# Patient Record
Sex: MALE | Race: OTHER | HISPANIC OR LATINO | Employment: OTHER | URBAN - METROPOLITAN AREA
[De-identification: names, ages, dates, MRNs, and addresses within clinical notes are randomized per-mention and may not be internally consistent; named-entity substitution may affect disease eponyms.]

---

## 2017-01-09 ENCOUNTER — GENERIC CONVERSION - ENCOUNTER (OUTPATIENT)
Dept: INTERNAL MEDICINE CLINIC | Facility: CLINIC | Age: 67
End: 2017-01-09

## 2017-08-07 ENCOUNTER — GENERIC CONVERSION - ENCOUNTER (OUTPATIENT)
Dept: INTERNAL MEDICINE CLINIC | Facility: CLINIC | Age: 67
End: 2017-08-07

## 2017-08-11 ENCOUNTER — GENERIC CONVERSION - ENCOUNTER (OUTPATIENT)
Dept: INTERNAL MEDICINE CLINIC | Facility: CLINIC | Age: 67
End: 2017-08-11

## 2017-09-13 ENCOUNTER — GENERIC CONVERSION - ENCOUNTER (OUTPATIENT)
Dept: INTERNAL MEDICINE CLINIC | Facility: CLINIC | Age: 67
End: 2017-09-13

## 2017-09-25 ENCOUNTER — GENERIC CONVERSION - ENCOUNTER (OUTPATIENT)
Dept: INTERNAL MEDICINE CLINIC | Facility: CLINIC | Age: 67
End: 2017-09-25

## 2017-10-23 ENCOUNTER — GENERIC CONVERSION - ENCOUNTER (OUTPATIENT)
Dept: OTHER | Facility: OTHER | Age: 67
End: 2017-10-23

## 2017-10-23 ENCOUNTER — GENERIC CONVERSION - ENCOUNTER (OUTPATIENT)
Dept: INTERNAL MEDICINE CLINIC | Facility: CLINIC | Age: 67
End: 2017-10-23

## 2017-10-25 ENCOUNTER — GENERIC CONVERSION - ENCOUNTER (OUTPATIENT)
Dept: INTERNAL MEDICINE CLINIC | Facility: CLINIC | Age: 67
End: 2017-10-25

## 2017-11-30 ENCOUNTER — GENERIC CONVERSION - ENCOUNTER (OUTPATIENT)
Dept: OTHER | Facility: OTHER | Age: 67
End: 2017-11-30

## 2017-12-06 ENCOUNTER — ALLSCRIPTS OFFICE VISIT (OUTPATIENT)
Dept: OTHER | Facility: OTHER | Age: 67
End: 2017-12-06

## 2017-12-10 NOTE — CONSULTS
Assessment  1  Polyp of colon (211 3) (K63 5)   2  Chronic GERD (530 81) (K21 9)    Plan  Chronic GERD    · Omeprazole 40 MG Oral Capsule Delayed Release; take 1 capsule daily   Rx By: Madiha Pond; Dispense: 30 Days ; #:30 Capsule Delayed Release; Refill: 3;Chronic GERD; TRESSA = N; Verified Transmission to [id number]; Last Updated By: System, SureScriVixlo; 12/6/2017 9:16:50 AM   · Follow-up visit in 3 months Evaluation and Treatment  Follow-up  Status: Hold For -Scheduling  Requested for: [id number]   Ordered; For: Chronic GERD; Ordered By: Madiha Pond Performed:  Due: 56KXH9722   · EGD; Status:Active; Requested for:82Uab5293;    Perform:[facility]; Due:32Dns3405; Last Updated Diane Garcia; 12/6/2017 9:37:24 AM;Ordered; For:Chronic GERD; Ordered By:Santana Paiz;  Polyp of colon    · COLONOSCOPY (GI, SURG); Status:Active; Requested for:49Cmn9118;    Perform:[facility]; Order Comments:with polypectomy - 45 minutes in [place] ; Due:11Btw9911; Last Updated By:Marisa Cooper; 12/6/2017 9:37:24 AM;Ordered;of colon; Ordered By:Santana Paiz;    Discussion/Summary  Discussion Summary:   80-year-old male who [facility] for [facility] of large polyp  He had a 2 2 cm large polyp in the transverse colon that was marked with spot  I will schedule him for colonoscopy with polypectomy  I discussed with the patient that since this polyp is large there is slightly increased risk of bleeding and perforation Patient verbalized understanding and agreed to proceed with colonoscopy  He has chronic gastroesophageal reflux disease  I will start him on omeprazole 40 mg daily  We reviewed reflux precautions  I gave him information on reflux precaution  A schedule him for EGD to assess for esophagitis and rule out Alexis's esophagus  Follow-up 3 months     Counseling Documentation With Imm: The patient was counseled regarding prognosis,-- patient and family education,-- impressions  Goals and Barriers: The patient has the current Goals: See above  The patent has the current Barriers: None  Patient's Capacity to Self-Care: Patient is able to Self-Care  Chief Complaint  Chief Complaint Free Text Note Form: Colon consult  Pt states no active symptoms  Referred by Dr mEerita Vera  History of Present Illness  HPI: 51-year-old pleasant male with diabetes, hyperlipidemia, hypertension and BPH referred for colonoscopy and polypectomy  Patient had colonoscopy on October 23, 2017 by Dr Hernan Ortiz in [facility]  Colonoscopy was performed for screening and intermittent rectal bleeding  He had a 7 mm sessile polyp removed from transverse colon  At 1 2 cm sessile polyp removed from transverse colon  A 7 mm sessile polyp removed from transverse colon  At 1 7 cm sessile polyp removed from ascending colon 3 small sessile polyps removed from the cecum  A 2 2 cm sessile polyp was noted in the transverse colon but not removed  It was marked with Spot  He has chronic gastroesophageal reflux disease  His symptoms include epigastric burning pain and occasional regurgitation  His symptoms are worse with eating spicy food and alcohol  He takes over-the-counter antacid and [place] for his symptoms  Review of Systems  Complete-Male GI Adult:  Constitutional: No fever or chills, feels well, no tiredness, no recent weight gain or weight loss  Eyes: No complaints of eye pain, no red eyes, no discharge from eyes, no itchy eyes  ENT: no complaints of earache, no hearing loss, no nosebleeds, no nasal discharge, no sore throat, no hoarseness  Cardiovascular: No complaints of slow heart rate, no fast heart rate, no chest pain, no palpitations, no leg claudication, no lower extremity  Respiratory: No complaints of shortness of breath, no wheezing, no cough, no SOB on exertion, no orthopnea or PND    Gastrointestinal: as noted in HPI  Genitourinary: No complaints of dysuria, no incontinence, no hesitancy, no nocturia, no genital lesion, no testicular pain  Musculoskeletal: No complaints of arthralgia, no myalgias, no joint swelling or stiffness, no limb pain or swelling  Integumentary: No complaints of skin rash or skin lesions, no itching, no skin wound, no dry skin  Neurological: No compliants of headache, no confusion, no convulsions, no numbness or tingling, no dizziness or fainting, no limb weakness, no difficulty walking  Psychiatric: Is not suicidal, no sleep disturbances, no anxiety or depression, no change in personality, no emotional problems  Endocrine: No complaints of proptosis, no hot flashes, no muscle weakness, no erectile dysfunction, no deepening of the voice, no feelings of weakness  Hematologic/Lymphatic: No complaints of swollen glands, no swollen glands in the neck, does not bleed easily, no easy bruising  ROS Reviewed:   ROS reviewed  Active Problems  1  Benign essential hypertension (401 1) (I10)   2  DM type 2 (diabetes mellitus, type 2) (250 00) (E11 9)   3  Osteoarthritis of lumbar spine (721 3) (M47 816)   4  Polyp of colon (211 3) (K63 5)    Past Medical History  1  History of carpal tunnel syndrome (V12 49) (A94 88)  Active Problems And Past Medical History Reviewed: The active problems and past medical history were reviewed and updated today  Surgical History  1  History of Appendectomy   2  History of Neuroplasty Median Nerve At Carpal Tunnel  Surgical History Reviewed: The surgical history was reviewed and updated today  Family History  Mother    1  Family history of hypertension (V17 49) (Z82 49)  Father    2  Family history of gastric ulcer (V18 59) (Z83 79)  Family History Reviewed: The family history was reviewed and updated today  Social History     · Never a smoker   · No illicit drug use   · Social alcohol use (Z78 9)  Social History Reviewed:  The social history was reviewed and updated today  The social history was reviewed and is unchanged  Current Meds   1  AmLODIPine Besylate 10 MG Oral Tablet; TAKE 1 TABLET DAILY  Requested for: 36VRO2404; Last Rx:30Nov2017 Ordered   2  Ibuprofen 600 MG Oral Tablet; Take one tablet every 8 hours as needed for pain; Therapy: (Recorded:30Nov2017) to Recorded   3  MetFORMIN HCl - 1000 MG Oral Tablet; TAKE 1 TABLET TWICE DAILY  Requested for: 02XIE4447; Last Rx:30Nov2017 Ordered   4  Oxybutynin Chloride ER 10 MG Oral Tablet Extended Release 24 Hour; take 1 tablet by mouth every day  Requested for: 68KNO6118; Last Rx:30Nov2017 Ordered   5  Rosuvastatin Calcium 20 MG Oral Tablet; TAKE 1 TABLET DAILY  Requested for: 66BRA3645; Last Rx:30Nov2017 Ordered   6  Valsartan 320 MG Oral Tablet; TAKE 1 TABLET DAILY  Requested for: 15LER8761; Last Rx:30Nov2017 Ordered  Medication List Reviewed: The medication list was reviewed and updated today  Allergies  1  No Known Drug Allergies    Vitals  Vital Signs    Recorded: 52AWA6715 08:36AM   Temperature 97 8 F, Tympanic   Heart Rate 79   Systolic 201, LUE, Sitting   Diastolic 82, LUE, Sitting   Height 5 ft 2 99 in   Weight 184 lb    BMI Calculated 32 61   BSA Calculated 1 87   O2 Saturation 98       Physical Exam   Constitutional  General appearance: No acute distress, well appearing and well nourished  Ears, Nose, Mouth, and Throat  Nasal mucosa, septum, and turbinates: Normal without edema or erythema  Oropharynx: Normal with no erythema, edema, exudate or lesions  Pulmonary  Respiratory effort: No increased work of breathing or signs of respiratory distress  Auscultation of lungs: Clear to auscultation, equal breath sounds bilaterally, no wheezes, no rales, no rhonci  Cardiovascular  Auscultation of heart: Normal rate and rhythm, normal S1 and S2, without murmurs  Abdomen  Abdomen: Non-tender, no masses  Liver and spleen: No hepatomegaly or splenomegaly     Lymphatic Palpation of lymph nodes in neck: No lymphadenopathy  Musculoskeletal  Gait and station: Normal    Skin  Skin and subcutaneous tissue: Normal without rashes or lesions  Neurologic  Reflexes: 2+ and symmetric     Psychiatric  Orientation to person, place and time: Normal          Future Appointments    Date/Time Provider Specialty Site   02/08/2018 08:30 AM Leo Le MD Gastroenterology Adult [facility] OUTPATIENT   02/21/2018 10:30 AM Shawnee Arreola MD Internal Medicine [facility] Naz Cooper       Signatures   Electronically signed by : Santhosh Wan MD; Dec  9 2017  2:32PM EST                       (Author)

## 2017-12-21 LAB
GLUCOSE SERPL-MCNC: 180 MG/DL
HBA1C MFR BLD HPLC: 8.6 %
LDLC SERPL CALC-MCNC: NORMAL MG/DL
PSA (HISTORICAL): 4.2

## 2018-01-22 VITALS
WEIGHT: 180.38 LBS | SYSTOLIC BLOOD PRESSURE: 134 MMHG | DIASTOLIC BLOOD PRESSURE: 76 MMHG | HEIGHT: 63 IN | BODY MASS INDEX: 31.96 KG/M2 | TEMPERATURE: 97 F | HEART RATE: 88 BPM | OXYGEN SATURATION: 98 %

## 2018-01-22 VITALS
WEIGHT: 184 LBS | TEMPERATURE: 97.8 F | DIASTOLIC BLOOD PRESSURE: 82 MMHG | OXYGEN SATURATION: 98 % | SYSTOLIC BLOOD PRESSURE: 136 MMHG | BODY MASS INDEX: 32.6 KG/M2 | HEART RATE: 79 BPM | HEIGHT: 63 IN

## 2018-02-01 RX ORDER — OXYBUTYNIN CHLORIDE 10 MG/1
10 TABLET, EXTENDED RELEASE ORAL
COMMUNITY
End: 2019-01-31 | Stop reason: SDUPTHER

## 2018-02-01 RX ORDER — ROSUVASTATIN CALCIUM 20 MG/1
20 TABLET, COATED ORAL DAILY
COMMUNITY
End: 2018-06-20 | Stop reason: SDUPTHER

## 2018-02-01 RX ORDER — VALSARTAN 320 MG/1
320 TABLET ORAL DAILY
COMMUNITY
End: 2018-09-09 | Stop reason: SDUPTHER

## 2018-02-01 RX ORDER — IBUPROFEN 600 MG/1
TABLET ORAL EVERY 8 HOURS PRN
COMMUNITY
End: 2018-10-26 | Stop reason: SDUPTHER

## 2018-02-01 RX ORDER — AMLODIPINE BESYLATE 10 MG/1
10 TABLET ORAL DAILY
COMMUNITY
End: 2018-02-20

## 2018-02-07 ENCOUNTER — ANESTHESIA EVENT (OUTPATIENT)
Dept: GASTROENTEROLOGY | Facility: HOSPITAL | Age: 68
End: 2018-02-07
Payer: COMMERCIAL

## 2018-02-08 ENCOUNTER — HOSPITAL ENCOUNTER (OUTPATIENT)
Facility: HOSPITAL | Age: 68
Setting detail: OUTPATIENT SURGERY
Discharge: HOME | End: 2018-02-08
Attending: INTERNAL MEDICINE | Admitting: INTERNAL MEDICINE
Payer: COMMERCIAL

## 2018-02-08 ENCOUNTER — ANESTHESIA (OUTPATIENT)
Dept: GASTROENTEROLOGY | Facility: HOSPITAL | Age: 68
End: 2018-02-08
Payer: COMMERCIAL

## 2018-02-08 VITALS
HEART RATE: 56 BPM | RESPIRATION RATE: 15 BRPM | WEIGHT: 180 LBS | HEIGHT: 65 IN | DIASTOLIC BLOOD PRESSURE: 84 MMHG | TEMPERATURE: 97.3 F | SYSTOLIC BLOOD PRESSURE: 151 MMHG | OXYGEN SATURATION: 97 % | BODY MASS INDEX: 29.99 KG/M2

## 2018-02-08 DIAGNOSIS — K63.5 POLYP OF COLON: ICD-10-CM

## 2018-02-08 DIAGNOSIS — K21.9 GASTRO-ESOPHAGEAL REFLUX DISEASE WITHOUT ESOPHAGITIS: ICD-10-CM

## 2018-02-08 PROCEDURE — 43239 EGD BIOPSY SINGLE/MULTIPLE: CPT | Performed by: INTERNAL MEDICINE

## 2018-02-08 PROCEDURE — 88305 TISSUE EXAM BY PATHOLOGIST: CPT | Performed by: INTERNAL MEDICINE

## 2018-02-08 PROCEDURE — 88342 IMHCHEM/IMCYTCHM 1ST ANTB: CPT | Performed by: PATHOLOGY

## 2018-02-08 PROCEDURE — 45380 COLONOSCOPY AND BIOPSY: CPT | Performed by: INTERNAL MEDICINE

## 2018-02-08 PROCEDURE — 88305 TISSUE EXAM BY PATHOLOGIST: CPT | Performed by: PATHOLOGY

## 2018-02-08 PROCEDURE — 88342 IMHCHEM/IMCYTCHM 1ST ANTB: CPT | Performed by: INTERNAL MEDICINE

## 2018-02-08 PROCEDURE — 45385 COLONOSCOPY W/LESION REMOVAL: CPT | Performed by: INTERNAL MEDICINE

## 2018-02-08 RX ORDER — ONDANSETRON 2 MG/ML
4 INJECTION INTRAMUSCULAR; INTRAVENOUS EVERY 6 HOURS PRN
Status: DISCONTINUED | OUTPATIENT
Start: 2018-02-08 | End: 2018-02-08 | Stop reason: HOSPADM

## 2018-02-08 RX ORDER — PROPOFOL 10 MG/ML
INJECTION, EMULSION INTRAVENOUS CONTINUOUS PRN
Status: DISCONTINUED | OUTPATIENT
Start: 2018-02-08 | End: 2018-02-08 | Stop reason: SURG

## 2018-02-08 RX ORDER — SODIUM CHLORIDE 9 MG/ML
125 INJECTION, SOLUTION INTRAVENOUS CONTINUOUS
Status: DISCONTINUED | OUTPATIENT
Start: 2018-02-08 | End: 2018-02-08 | Stop reason: HOSPADM

## 2018-02-08 RX ORDER — GLYCOPYRROLATE 0.2 MG/ML
INJECTION INTRAMUSCULAR; INTRAVENOUS AS NEEDED
Status: DISCONTINUED | OUTPATIENT
Start: 2018-02-08 | End: 2018-02-08 | Stop reason: SURG

## 2018-02-08 RX ORDER — PROPOFOL 10 MG/ML
INJECTION, EMULSION INTRAVENOUS AS NEEDED
Status: DISCONTINUED | OUTPATIENT
Start: 2018-02-08 | End: 2018-02-08 | Stop reason: SURG

## 2018-02-08 RX ADMIN — ONDANSETRON 4 MG: 2 INJECTION INTRAMUSCULAR; INTRAVENOUS at 10:01

## 2018-02-08 RX ADMIN — SODIUM CHLORIDE 125 ML/HR: 0.9 INJECTION, SOLUTION INTRAVENOUS at 07:30

## 2018-02-08 RX ADMIN — PROPOFOL 150 MCG/KG/MIN: 10 INJECTION, EMULSION INTRAVENOUS at 08:34

## 2018-02-08 RX ADMIN — PROPOFOL 50 MG: 10 INJECTION, EMULSION INTRAVENOUS at 08:41

## 2018-02-08 RX ADMIN — PROPOFOL 100 MG: 10 INJECTION, EMULSION INTRAVENOUS at 08:34

## 2018-02-08 RX ADMIN — GLYCOPYRROLATE 0.2 MG: 0.2 INJECTION, SOLUTION INTRAMUSCULAR; INTRAVENOUS at 08:41

## 2018-02-08 NOTE — DISCHARGE INSTRUCTIONS
Colorectal Polyps   WHAT YOU NEED TO KNOW:   What are colorectal polyps? Colorectal polyps are small growths of tissue in the lining of the colon and rectum  Most polyps are hyperplastic polyps and are usually benign (noncancerous)  Certain types of polyps, called adenomatous polyps, may turn into cancer  What increases my risk of colorectal polyps? The exact cause of colorectal polyps is unknown  The following may increase your risk:  · Older age    · A diet of foods high in fat and low in fiber     · Family history of polyps    · Intestinal diseases, such as Crohn's disease or ulcerative colitis    · An unhealthy lifestyle, such as physical inactivity, smoking, or drinking alcohol    · Obesity  What are the signs and symptoms of colorectal polyps? · Blood in your bowel movement or bleeding from the rectum    · Change in bowel movement habits, such as diarrhea and constipation    · Abdominal pain  How are colorectal polyps diagnosed? You should have fecal blood screening once a year for colorectal disease if you are over 48years old  You should be screened earlier if you have an intestinal disease or a family history of polyps or colorectal cancer  During this screening, a sample of your bowel movement is checked for blood, which may be an early sign of colorectal polyps or cancer  You may also need any of the following tests:  · Digital rectal exam:  Your healthcare provider will examine your anus and use a finger to check your rectum for polyps  · Barium enema: A barium enema is an x-ray of the colon  A tube is put into your anus, and a liquid called barium is put through the tube  Barium is used so that caregivers can see your colon better on the x-ray film  · Virtual colonoscopy: This is a CT scan that takes pictures of the inside of your colon and rectum  A small, flexible tube is put into your rectum and air or carbon dioxide (gas) is used to expand your colon   This lets healthcare providers clearly see your colon and any polyps on a monitor  · Colonoscopy or sigmoidoscopy: These procedures help your healthcare provider see the inside of your colon using a flexible tube with a small light and camera on the end  During a sigmoidoscopy, your healthcare provider will only look at rectum and lower colon  During a colonoscopy, healthcare providers will look at the full length of your colon  Healthcare providers may remove a small amount of tissue from the colon for a biopsy  How are colorectal polyps treated? · Polyp removal:  Polyps may be removed during your sigmoidoscopy or colonoscopy  · Polypectomy: This is surgery to remove your polyps  You may need laparoscopic or open surgery, depending on the type, size, and number of polyps that you have  Laparoscopy is done by inserting a small, flexible scope into incisions made on your abdomen  Open surgery is done by making a larger incision on your abdomen   What are the risks of colorectal polyps? You may bleed during a colonoscopy procedure  Your bowel may be perforated (torn) when polyps are removed  This may lead to an open abdominal surgery  During surgery, you may bleed too much or get an infection  Adenomatous polyps that are not removed may turn into cancer and become more difficult to treat  Where can I find support and more information? · Kylie Yun ([facility])  [address], [place] [address]  Phone: [phone]  Web Address: Yevette Halsted  [facility] gov  When should I contact my healthcare provider? · You have a fever  · You have chills, a cough, or feel weak and achy  · You have abdominal pain that does not go away or gets worse after you take medicine  · Your abdomen is swollen  · You are losing weight without trying  · You have questions or concerns about your condition or care  When should I seek immediate care or call 911?    · You have sudden shortness of breath  · You have a fast heart rate, fast breathing, or are too dizzy to stand up  · You have severe abdominal pain  · You see blood in your bowel movement  CARE AGREEMENT:   You have the right to help plan your care  Learn about your health condition and how it may be treated  Discuss treatment options with your caregivers to decide what care you want to receive  You always have the right to refuse treatment  The above information is an  only  It is not intended as medical advice for individual conditions or treatments  Talk to your doctor, nurse or pharmacist before following any medical regimen to see if it is safe and effective for you  © 2017 [employer]  Information is for End User's use only and may not be sold, redistributed or otherwise used for commercial purposes  All illustrations and images included in CareNotes® are the copyrighted property of A D A M , Inc  or [employer]  Impression     Multiple colon polyps removed  The largest 1 measured 20 mm in the 2nd largest measured 15 mm  Recommendation    Follow-up biopsy result  Discharge home  If you have abdominal pain, bleeding or fever call my office at [phone]  Colorectal Polyps   WHAT YOU NEED TO KNOW:   Colorectal polyps are small growths of tissue in the lining of the colon and rectum  Most polyps are hyperplastic polyps and are usually benign (noncancerous)  Certain types of polyps, called adenomatous polyps, may turn into cancer  DISCHARGE INSTRUCTIONS:   Follow up with your healthcare provider or gastroenterologist as directed: You may need to return for more tests, such as another colonoscopy  Write down your questions so you remember to ask them during your visits    Reduce your risk for colorectal polyps:   · Eat a variety of healthy foods:  Healthy foods include fruit, vegetables, whole-grain breads, low-fat dairy products, beans, lean meat, and fish  Ask if you need to be on a special diet  · Maintain a healthy weight:  Ask your healthcare provider if you need to lose weight and how much you need to lose  Ask for help with a weight loss program     · Exercise:  Begin to exercise slowly and do more as you get stronger  Talk with your healthcare provider before you start an exercise program      · Limit alcohol:  Your risk for polyps increases the more you drink  · Do not smoke: If you smoke, it is never too late to quit  Ask for information about how to stop  For support and more information:   · Kylie Yun ([facility])  [address] [place], [place] [address]  Phone: [phone]  Web Address: www digestive  niddk nih gov  Contact your healthcare provider or gastroenterologist if:   · You have a fever  · You have chills, a cough, or feel weak and achy  · You have abdominal pain that does not go away or gets worse after you take medicine  · Your abdomen is swollen  · You are losing weight without trying  · You have questions or concerns about your condition or care  Seek care immediately or call 911 if:   · You have sudden shortness of breath  · You have a fast heart rate, fast breathing, or are too dizzy to stand up  · You have severe abdominal pain  · You see blood in your bowel movement  © 2017 [employer]  Information is for End User's use only and may not be sold, redistributed or otherwise used for commercial purposes  All illustrations and images included in CareNotes® are the copyrighted property of A D A M , Inc  or Tiburcio Cuello  The above information is an  only  It is not intended as medical advice for individual conditions or treatments  Talk to your doctor, nurse or pharmacist before following any medical regimen to see if it is safe and effective for you            Gastritis   LO QUE NECESITA SABER:   La gastritis es [place] inflamación o irritación del revestimiento del estómago  INSTRUCCIONES SOBRE EL [facility]ITAL[facility]:   Llame al 911 en neda de presentar lo siguiente:   · Tiene dolor de pecho o le falta el aire  Busque atención médica de inmediato si:   · Usted vomita brandy  · Usted tiene evacuaciones intestinales negras o con brandy  · Usted tiene un aimee dolor de estómago o de espalda  Pregúntele a romeo [place] vitaminas y minerales son adecuados para usted  · Usted tiene fiebre  · Usted tiene síntomas nuevos o estos empeoran, aun después del [place]  · Usted tiene preguntas o inquietudes acerca de romeo condición o cuidado  Medicamentos:   · Medicamentos,  para ayudar a tratar la infección bacteriana o para disminuir el ácido estomacal      · [place] svitlana medicamentos sandee se le haya indicado  Consulte con romeo médico si usted indaina que romeo medicamento no le está ayudando o si presenta efectos secundarios  Infórmele si es alérgico a cualquier medicamento  Mantenga deisi lista actualizada de los [place], las vitaminas y los productos herbales que alberto  Incluya los siguientes datos de los medicamentos: cantidad, frecuencia y motivo de administración  Traiga con usted la lista o los envases de la píldoras a svitlana citas de seguimiento  Lleve la lista de los medicamentos con usted en neda de deisi emergencia  Maneje o evite la gastritis:   · No fume  La nicotina y otras sustancias químicas de los cigarrillos y los cigarros pueden empeorar svitlana síntomas y causar daño pulmonar  Pida información a romeo médico si usted actualmente fuma y necesita ayuda para dejar de fumar  Los cigarrillos electrónicos o tabaco sin humo todavía contienen nicotina  Consulte con romeo médico antes de [employer]  · No consuma alcohol  El alcohol puede evitar la cicatrización y empeorar la gastritis  Consulte con romeo médico si usted necesita ayuda para dejar de jordana alcohol      · No tome medicamentos [place] o aspirina a menos que así se lo indiquen  Estos analgésicos y medicamentos similares pueden causar irritación  Si marquez médico lo autoriza a jordana [employer] [place], tómelos con la comida  · No coma alimentos que le provocan irritación:  Los alimentos sandee las naranjas y la salsa pueden causar ardor o dolor  Consuma alimentos saludables y variados  [address] frutas (no las cítricas), verduras, productos lácteos descremados, legumbres, pan integral al [employer] las kylie [place] y pescado  Trate de comer porciones más pequeñas y jordana agua con svitlana comidas  No coma nada al menos por 3 horas antes de acostarse  · Encuentre maneras de relajarse y reducir el estrés  El estrés puede aumentar el ácido estomacal y empeorar la gastritis  Las [employer] yoga, la meditación o el escuchar música pueden ayudarlo a [place]  Pase tiempo con amigos, o lina cosas que disfruta  Acuda a svitlana consultas de control con marquez médico según le indicaron  Puede que necesite exámenes o tratamiento continuos o derivación a un gastroenterólogo  Anote svitlana preguntas para que se acuerde de hacerlas yoselin svitlana visitas  © 2017 [address] [place] Information is for End User's use only and may not be sold, redistributed or otherwise used for commercial purposes  All illustrations and images included in CareNotes® are the copyrighted property of A D A M , Inc  or Tiburcio Cuello  Esta información es sólo para uso en educación  Marquez intención no es darle un consejo médico sobre enfermedades o tratamientos  Colsulte con marquez Britni Rivera farmacéutico antes de seguir cualquier régimen médico para saber si es seguro y efectivo para usted

## 2018-02-08 NOTE — H&P
History and Physical -  [facility]  Andriy Breen 79 y o  male MRN: [id number]    HPI: Setvenson Schwartz is a 79y o  year old male who presents for colonoscopy for polypectomy  He also has GERD  EGD to assess for Alexis's esophagus  Review of Systems    Historical Information   Past Medical History:   Diagnosis Date    Arthritis     OSTEOARTHRITIS OF LUMBAR SPINE    Diabetes mellitus ([address] )     GERD (gastroesophageal reflux disease)     History of BPH     Hx of adenomatous colonic polyps     Hyperlipidemia     Hypertension      Past Surgical History:   Procedure Laterality Date    APPENDECTOMY      CARPAL TUNNEL RELEASE      COLONOSCOPY       Social History   History   Alcohol Use No     Comment: social     History   Drug Use No     History   Smoking Status    Never Smoker   Smokeless Tobacco    Never Used     History reviewed  No pertinent family history  Meds/Allergies     Prescriptions Prior to Admission   Medication    amLODIPine (NORVASC) 10 mg tablet    ibuprofen (MOTRIN) 600 mg tablet    metFORMIN (GLUCOPHAGE) 1000 MG tablet    oxybutynin (DITROPAN-XL) 10 MG 24 hr tablet    rosuvastatin (CRESTOR) 20 MG tablet    valsartan (DIOVAN) 320 MG tablet       No Known Allergies    Objective     Blood pressure 157/73, pulse 60, temperature (!) 97 3 °F (36 3 °C), temperature source Tympanic, resp  rate 16, height 5' 5" (1 651 m), weight 81 6 kg (180 lb), SpO2 95 %  PHYSICAL EXAM    Gen: NAD  CV: RRR  CHEST: Clear  ABD: soft, NT/ND  EXT: no edema  Neuro: AAO      ASSESSMENT/PLAN:  This is a 79y o  year old male here for colonoscopy with polypectomy  He had large polyp seen on his last colonoscopy in the [place]  EGD to screen for Alexis's esophagus    PLAN:   Procedure:  Colonoscopy with polypectomy  Reviewed risks benefits and alternatives  Risks include perforation, bleeding need for urgent surgery and missed lesion

## 2018-02-08 NOTE — OP NOTE
OPERATIVE REPORT  PATIENT NAME: Betty Menchaca    :  1950  MRN: [id number]  Pt Location: [facility]    SURGERY DATE: 2018    Surgeon(s) and Role:     Johanna Espinosa MD - Primary    Colonoscopy Procedure Note    Procedure: Colonoscopy    Sedation: Monitored anesthesia care, check anesthesia records      ASA Class: 2    INDICATIONS: History of Colon Polyps on colonoscopy done in 2017 in [facility]  He had large polyp in the transverse colon that was not removed  He had 4 polyps removed on his last colonoscopy  POST-OP DIAGNOSIS: See the impression below    Procedure Details     Prior colonoscopy: Less than 3 years ago  It is being repeated at an interval of less than 3 years because: The polyps were not completely removed  Informed consent was obtained for the procedure, including sedation  Risks of perforation, hemorrhage, adverse drug reaction and aspiration were discussed  The patient was placed in the left lateral decubitus position  Based on the pre-procedure assessment, including review of the patient's medical history, medications, allergies, and review of systems, he had been deemed to be an appropriate candidate for conscious sedation; he was therefore sedated with the medications listed below  The patient was monitored continuously with telemetry, pulse oximetry, blood pressure monitoring, and direct observations  A rectal examination was performed  The colonoscope was inserted into the rectum and advanced under direct vision to the cecum, which was identified by the ileocecal valve and appendiceal orifice  The quality of the colonic preparation was good  A careful inspection was made as the colonoscope was withdrawn, including a retroflexed view of the rectum; findings and interventions are described below  Findings:  A 15 mm semi pedunculated polyp adjacent to prior tattoo was noted in the proximal transverse colon    The polyp was removed using snare cautery  Another semi pedunculated polyp measuring 20 mm was found in the hepatic flexure  This polyp was completely removed using snare cautery  One hemoclip placed at the polypectomy to prevent post polypectomy bleeding  Prior polypectomy site in the cecum with hemoclip in place was noted  There was small residual polypoid tissue  I removed the residual polyp tissue and hemoclip using snare cautery  Another hemoclip was noted in the ascending colon at the polypectomy site  Small polypoid tissue noted again and removed using cold biopsy forceps  A 5 mm flat polyp in the ascending colon was removed using snare cautery  Two flat polyps measuring 3 mm were noted in mid transverse colon  The polyps were removed using cold snare  One of the polyp was retrieved  Additional 5 mm polyp was noted in the descending colon and removed using snare cautery  Complications:  None; patient tolerated the procedure well  Impression:    Multiple colon polyps removed as outlined above  The largest measuring 20 mm and the 2nd largest measuring 15 mm  Recommendations:  Repeat colonoscopy in 1 years given the fact that he had more than 10 polyps removed  Discharge home  Resume regular diet  If he of abdominal pain, fever or rectal bleeding call my office at [phone]        SIGNATURE: Johnna Giles MD  DATE: February 8, 2018  TIME: 9:30 AM

## 2018-02-08 NOTE — OP NOTE
OPERATIVE REPORT  PATIENT NAME: Og Alvarez    :  1950  MRN: [id number]  Pt Location: [facility]    SURGERY DATE: 2018    Surgeon(s) and Role:     * Meg Rios MD - Primary    ESOPHAGOGASTRODUODENOSCOPY    PROCEDURE: EGD    SEDATION: Monitored anesthesia care, check anesthesia records    ASA Class: 2    INDICATIONS:  Gastroesophageal reflux disease  No prior EGD  CONSENT:  Informed consent was obtained for the procedure, including sedation after explaining the risks and benefits of the procedure  Risks including but not limited to bleeding, perforation, infection, and missed lesion  PREPARATION:   Telemetry, pulse oximetry, blood pressure were monitored throughout the procedure  Patient was identified by myself both verbally and by visual inspection of ID band  DESCRIPTION:   Patient was placed in the left lateral decubitus position and was sedated with the above medication  The gastroscope was introduced in to the oropharynx and the esophagus was intubated under direct visualization  Scope was passed down the esophagus up to 2nd part of the duodenum  A careful inspection was made as the gastroscope was withdrawn, including a retroflexed view of the stomach; findings and interventions are described below  FINDINGS:    #1  Esophagus-normal esophagus  GE junction at 39 cm  #2  Stomach-mild erosive gastritis in the antrum  Two cold biopsies were taken to rule out H pylori infection  #3  Duodenum-normal duodenum         IMPRESSIONS:      Mild erosive gastritis in the antrum otherwise normal esophagus and duodenum  No evidence of Alexis's esophagus or esophagitis  RECOMMENDATIONS:     Reflux precaution  Continue omeprazole for 4 more weeks and then can switch to over-the-counter Pepcid 20 mg daily  Colonoscopy today  COMPLICATIONS:  None; patient tolerated the procedure well            DISPOSITION: PACU           CONDITION: Stable    SIGNATURE: Meg Rios MD  DATE: February 8, 2018  TIME: 9:29 AM

## 2018-02-08 NOTE — ANESTHESIA POSTPROCEDURE EVALUATION
Post-Op Assessment Note      CV Status:  Stable    Mental Status:  Awake    Hydration Status:  Euvolemic    PONV Controlled:  Controlled    Airway Patency:  Patent    Post Op Vitals Reviewed: Yes          Staff: CRNA, Anesthesiologist       Comments: pt sleeping, SV, airway patent          BP   133/79   Temp     Pulse  68   Resp   12   SpO2   95

## 2018-02-08 NOTE — ANESTHESIA PREPROCEDURE EVALUATION
Review of Systems/Medical History  Patient summary reviewed  Chart reviewed      Cardiovascular  Exercise tolerance: good,  Hyperlipidemia, Hypertension controlled,    Pulmonary  Negative pulmonary ROS        GI/Hepatic    GERD well controlled,        Negative  ROS        Endo/Other  Diabetes well controlled type 2 Oral agent,      GYN  Negative gynecology ROS          Hematology  Negative hematology ROS      Musculoskeletal    Arthritis     Neurology  Negative neurology ROS      Psychology   Negative psychology ROS              Physical Exam    Airway    Mallampati score: II  TM Distance: <3 FB  Neck ROM: full     Dental   upper dentures,     Cardiovascular  Rhythm: regular, Rate: normal,     Pulmonary  Breath sounds clear to auscultation,     Other Findings        Anesthesia Plan  ASA Score- 2     Anesthesia Type- IV sedation with anesthesia with ASA Monitors  Additional Monitors:   Airway Plan:         Plan Factors- Patient instructed to abstain from smoking on day of procedure  Patient did not smoke on day of surgery  Induction- intravenous  Postoperative Plan-     Informed Consent- Anesthetic plan and risks discussed with patient

## 2018-02-12 ENCOUNTER — TELEPHONE (OUTPATIENT)
Dept: GASTROENTEROLOGY | Facility: CLINIC | Age: 68
End: 2018-02-12

## 2018-02-12 DIAGNOSIS — K29.70 HELICOBACTER PYLORI GASTRITIS: Primary | ICD-10-CM

## 2018-02-12 DIAGNOSIS — B96.81 HELICOBACTER PYLORI GASTRITIS: Primary | ICD-10-CM

## 2018-02-12 RX ORDER — AMOXICILLIN 500 MG/1
1000 CAPSULE ORAL EVERY 12 HOURS SCHEDULED
Qty: 56 CAPSULE | Refills: 0 | Status: SHIPPED | OUTPATIENT
Start: 2018-02-12 | End: 2018-02-26

## 2018-02-12 RX ORDER — OMEPRAZOLE 20 MG/1
20 TABLET, DELAYED RELEASE ORAL 2 TIMES DAILY
Qty: 60 TABLET | Refills: 0 | Status: SHIPPED | OUTPATIENT
Start: 2018-02-12 | End: 2018-03-23

## 2018-02-12 RX ORDER — CLARITHROMYCIN 500 MG/1
500 TABLET, COATED ORAL EVERY 12 HOURS SCHEDULED
Qty: 28 TABLET | Refills: 0 | Status: SHIPPED | OUTPATIENT
Start: 2018-02-12 | End: 2018-02-26

## 2018-02-12 NOTE — TELEPHONE ENCOUNTER
[facility] pharmacy called to clarify omeprazole order---would like a call back at [phone]

## 2018-02-12 NOTE — PROGRESS NOTES
Biopsy showed H pylori induced gastritis  Triple therapy with amoxicillin, clarithromycin and omeprazole sent  He is going to take amoxicillin and clarithromycin for 2 weeks  And continue omeprazole 20 mg b i d  for 1 month

## 2018-02-13 ENCOUNTER — TELEPHONE (OUTPATIENT)
Dept: GASTROENTEROLOGY | Facility: MEDICAL CENTER | Age: 68
End: 2018-02-13

## 2018-02-13 NOTE — TELEPHONE ENCOUNTER
So I called the pharmacy and they also wanted to know about his blood pressure medication Amlodipine  Pls advise  If can continue taking the BP med

## 2018-02-13 NOTE — TELEPHONE ENCOUNTER
Pharmacy called to check the status of the rx, also stated the 20mg over the counter is not cover by his insurance, they need the capsule

## 2018-02-13 NOTE — TELEPHONE ENCOUNTER
Please let his pharmacy know that dose of omeprazole is 20 mg bid  He can Discontinue alfuzosin for 2 weeks ( while getting clarithromycin)        Thanks

## 2018-02-13 NOTE — TELEPHONE ENCOUNTER
----- Message from Lacho Tsang MD sent at 2/12/2018  4:05 PM EST -----  Pathology showed tubular adenomas these are precancerous polyps that were completely removed  Follow-up colonoscopy is recommended in 1 year  Biopsy from the stomach showed H pylori induced gastritis  I send the triple therapy prescription to his pharmacy  2 antibiotics and PPI    Thank you

## 2018-02-16 ENCOUNTER — DOCUMENTATION (OUTPATIENT)
Dept: GASTROENTEROLOGY | Facility: MEDICAL CENTER | Age: 68
End: 2018-02-16

## 2018-02-16 NOTE — TELEPHONE ENCOUNTER
I called his pharmacy to cancel prescription for clarithromycin and amoxacillin  Could you get him pylera sample?  Thanks

## 2018-02-20 ENCOUNTER — DOCUMENTATION (OUTPATIENT)
Dept: INTERNAL MEDICINE CLINIC | Facility: CLINIC | Age: 68
End: 2018-02-20

## 2018-02-21 ENCOUNTER — CLINICAL SUPPORT (OUTPATIENT)
Dept: INTERNAL MEDICINE CLINIC | Facility: CLINIC | Age: 68
End: 2018-02-21
Payer: COMMERCIAL

## 2018-02-21 DIAGNOSIS — E11.8 TYPE 2 DIABETES MELLITUS WITH COMPLICATION, UNSPECIFIED LONG TERM INSULIN USE STATUS: ICD-10-CM

## 2018-02-21 DIAGNOSIS — I10 BENIGN ESSENTIAL HTN: Primary | ICD-10-CM

## 2018-02-21 DIAGNOSIS — M47.816 OSTEOARTHRITIS OF LUMBAR SPINE, UNSPECIFIED SPINAL OSTEOARTHRITIS COMPLICATION STATUS: ICD-10-CM

## 2018-02-21 DIAGNOSIS — K63.5 POLYP OF COLON, UNSPECIFIED PART OF COLON, UNSPECIFIED TYPE: ICD-10-CM

## 2018-02-21 PROCEDURE — 36415 COLL VENOUS BLD VENIPUNCTURE: CPT

## 2018-02-22 LAB
ALBUMIN SERPL-MCNC: 4.2 G/DL (ref 3.6–5.1)
ALBUMIN/CREAT UR: 4 MCG/MG CREAT
ALBUMIN/GLOB SERPL: 1.6 (CALC) (ref 1–2.5)
ALP SERPL-CCNC: 72 U/L (ref 40–115)
ALT SERPL-CCNC: 26 U/L (ref 9–46)
AST SERPL-CCNC: 23 U/L (ref 10–35)
BILIRUB SERPL-MCNC: 0.6 MG/DL (ref 0.2–1.2)
BUN SERPL-MCNC: 22 MG/DL (ref 7–25)
BUN/CREAT SERPL: NORMAL (CALC) (ref 6–22)
CALCIUM SERPL-MCNC: 9.2 MG/DL (ref 8.6–10.3)
CHLORIDE SERPL-SCNC: 103 MMOL/L (ref 98–110)
CHOLEST SERPL-MCNC: 124 MG/DL
CHOLEST/HDLC SERPL: 3.2 (CALC)
CO2 SERPL-SCNC: 22 MMOL/L (ref 20–31)
CREAT SERPL-MCNC: 0.93 MG/DL (ref 0.7–1.25)
CREAT UR-MCNC: 104 MG/DL (ref 20–370)
ERYTHROCYTE [DISTWIDTH] IN BLOOD BY AUTOMATED COUNT: 13.7 % (ref 11–15)
GLOBULIN SER CALC-MCNC: 2.7 G/DL (CALC) (ref 1.9–3.7)
GLUCOSE SERPL-MCNC: 88 MG/DL (ref 65–99)
HBA1C MFR BLD: 6.3 % OF TOTAL HGB
HCT VFR BLD AUTO: 43.4 % (ref 38.5–50)
HDLC SERPL-MCNC: 39 MG/DL
HGB BLD-MCNC: 14.4 G/DL (ref 13.2–17.1)
LDLC SERPL CALC-MCNC: 59 MG/DL (CALC)
MCH RBC QN AUTO: 29.6 PG (ref 27–33)
MCHC RBC AUTO-ENTMCNC: 33.2 G/DL (ref 32–36)
MCV RBC AUTO: 89.3 FL (ref 80–100)
MICROALBUMIN UR-MCNC: 0.4 MG/DL
NONHDLC SERPL-MCNC: 85 MG/DL (CALC)
PLATELET # BLD AUTO: 243 THOUSAND/UL (ref 140–400)
PMV BLD REES-ECKER: 9.8 FL (ref 7.5–12.5)
POTASSIUM SERPL-SCNC: 4.1 MMOL/L (ref 3.5–5.3)
PROT SERPL-MCNC: 6.9 G/DL (ref 6.1–8.1)
RBC # BLD AUTO: 4.86 MILLION/UL (ref 4.2–5.8)
SL AMB EGFR AFRICAN AMERICAN: 98 ML/MIN/1.73M2
SL AMB EGFR NON AFRICAN AMERICAN: 85 ML/MIN/1.73M2
SODIUM SERPL-SCNC: 138 MMOL/L (ref 135–146)
TRIGL SERPL-MCNC: 184 MG/DL
WBC # BLD AUTO: 7.1 THOUSAND/UL (ref 3.8–10.8)

## 2018-02-27 ENCOUNTER — TELEPHONE (OUTPATIENT)
Dept: INTERNAL MEDICINE CLINIC | Facility: CLINIC | Age: 68
End: 2018-02-27

## 2018-03-23 ENCOUNTER — OFFICE VISIT (OUTPATIENT)
Dept: INTERNAL MEDICINE CLINIC | Facility: CLINIC | Age: 68
End: 2018-03-23
Payer: COMMERCIAL

## 2018-03-23 VITALS
HEIGHT: 65 IN | HEART RATE: 80 BPM | OXYGEN SATURATION: 97 % | TEMPERATURE: 98.1 F | SYSTOLIC BLOOD PRESSURE: 118 MMHG | DIASTOLIC BLOOD PRESSURE: 76 MMHG | WEIGHT: 184.8 LBS | BODY MASS INDEX: 30.79 KG/M2

## 2018-03-23 DIAGNOSIS — E78.2 MIXED HYPERLIPIDEMIA: ICD-10-CM

## 2018-03-23 DIAGNOSIS — N32.81 OVERACTIVE BLADDER: ICD-10-CM

## 2018-03-23 DIAGNOSIS — M47.816 OSTEOARTHRITIS OF LUMBAR SPINE, UNSPECIFIED SPINAL OSTEOARTHRITIS COMPLICATION STATUS: ICD-10-CM

## 2018-03-23 DIAGNOSIS — E11.41 TYPE 2 DIABETES MELLITUS WITH DIABETIC MONONEUROPATHY, WITHOUT LONG-TERM CURRENT USE OF INSULIN (HCC): Primary | ICD-10-CM

## 2018-03-23 DIAGNOSIS — I10 BENIGN ESSENTIAL HYPERTENSION: ICD-10-CM

## 2018-03-23 PROBLEM — K21.9 CHRONIC GERD: Status: ACTIVE | Noted: 2017-12-06

## 2018-03-23 PROBLEM — E11.9 DM TYPE 2 (DIABETES MELLITUS, TYPE 2) (HCC): Status: RESOLVED | Noted: 2017-11-30 | Resolved: 2018-03-23

## 2018-03-23 PROBLEM — N40.1 BPH WITH OBSTRUCTION/LOWER URINARY TRACT SYMPTOMS: Status: ACTIVE | Noted: 2017-12-21

## 2018-03-23 PROBLEM — E78.5 HYPERLIPIDEMIA: Status: ACTIVE | Noted: 2017-12-21

## 2018-03-23 PROBLEM — K64.1 GRADE II INTERNAL HEMORRHOIDS: Status: ACTIVE | Noted: 2017-12-21

## 2018-03-23 PROBLEM — E55.9 VITAMIN D INSUFFICIENCY: Status: ACTIVE | Noted: 2017-12-21

## 2018-03-23 PROBLEM — I42.0 DILATED CARDIOMYOPATHY (HCC): Status: ACTIVE | Noted: 2017-09-15

## 2018-03-23 PROBLEM — K63.5 POLYP OF COLON: Status: ACTIVE | Noted: 2017-11-30

## 2018-03-23 PROBLEM — N13.8 BPH WITH OBSTRUCTION/LOWER URINARY TRACT SYMPTOMS: Status: ACTIVE | Noted: 2017-12-21

## 2018-03-23 PROBLEM — R93.1 ABNORMAL ECHOCARDIOGRAM: Status: ACTIVE | Noted: 2017-09-15

## 2018-03-23 PROBLEM — E11.9 DM TYPE 2 (DIABETES MELLITUS, TYPE 2) (HCC): Status: ACTIVE | Noted: 2017-11-30

## 2018-03-23 PROCEDURE — 99214 OFFICE O/P EST MOD 30 MIN: CPT | Performed by: INTERNAL MEDICINE

## 2018-03-23 PROCEDURE — 1101F PT FALLS ASSESS-DOCD LE1/YR: CPT | Performed by: INTERNAL MEDICINE

## 2018-03-23 RX ORDER — AMLODIPINE BESYLATE 10 MG/1
10 TABLET ORAL DAILY
Refills: 1 | COMMUNITY
Start: 2018-03-17 | End: 2018-07-12 | Stop reason: SDUPTHER

## 2018-03-23 NOTE — PROGRESS NOTES
Assessment/Plan:    Benign essential hypertension  Blood pressure is nicely controlled on current medications no changes are necessary  Osteoarthritis of lumbar spine  Patient encouraged to continue ibuprofen as necessary  May need combination of muscle relaxants and or physical therapy on case by case basis    Overactive bladder  Patient was taking 2 different medications for his overactive bladder  We will continue his oxybutynin  Hyperlipidemia  Lipids are near goal   HDL cholesterol slightly low total cholesterol is acceptable and LDL cholesterol acceptable triglycerides are mildly elevated  Will continue Crestor at current dosing    Type 2 diabetes mellitus with diabetic mononeuropathy, without long-term current use of insulin ([facility] [address] )  Diabetes appears to be well controlled on current regimen with metformin alone and diet  We will continue adjunctive treatments  We will obtain follow-up urine for microalbumin will monitor his lipid profile yearly and follow-up hemoglobin A1c in 3 to 4 months  Diagnoses and all orders for this visit:    Type 2 diabetes mellitus with diabetic mononeuropathy, without long-term current use of insulin ([facility])  -     Comprehensive metabolic panel; Future  -     Microalbumin / creatinine urine ratio  -     HEMOGLOBIN A1C W/ EAG ESTIMATION; Future    Benign essential hypertension  -     CBC and Platelet; Future  -     Comprehensive metabolic panel; Future    Osteoarthritis of lumbar spine, unspecified spinal osteoarthritis complication status  -     CBC and Platelet; Future  -     Comprehensive metabolic panel; Future    Mixed hyperlipidemia    Overactive bladder    Other orders  -     amLODIPine (NORVASC) 10 mg tablet; Take 10 mg by mouth daily          Subjective:      Patient ID: Ganesh Yarbrough is a 76 y o  male  CMP was within normal limits as was his CBC    Patient presents to the office for follow-up visit for type 2 diabetes, hypertension, overactive bladder, chronic low back pain and hyperlipidemia  He has no complaints of chest pain or dyspnea  He does complain of intermittent back pain with at times sciatic type radiation but for the most part symptoms are relieved with combination of physical activity and anti-inflammatory medication and local applications of heat  He has required physical therapy in the past   Does not have any symptoms of back pain or sciatica at the present time  His lab work was reviewed  Hemoglobin A1c is 6 3% which is an improvement from the 8 6% previously  Total cholesterol is 124, HDL 39 lipids are mildly elevated at 184 and LDL cholesterol 59  Family History   Problem Relation Age of Onset    Heart disease Mother     Hypertension Mother     Ulcers Father     Stomach cancer Father      Social History     Social History    Marital status: /Civil Union     Spouse name: N/A    Number of children: N/A    Years of education: N/A     Occupational History    Not on file       Social History Main Topics    Smoking status: Never Smoker    Smokeless tobacco: Never Used    Alcohol use No    Drug use: No    Sexual activity: Not on file     Other Topics Concern    Not on file     Social History Narrative    No narrative on file     Past Medical History:   Diagnosis Date    Arthritis     OSTEOARTHRITIS OF LUMBAR SPINE    Carpal tunnel syndrome     Diabetes mellitus ([facility] [address] )     Elevated prostate specific antigen (PSA)     Erectile dysfunction     GERD (gastroesophageal reflux disease)     History of BPH     Hx of adenomatous colonic polyps     Hyperlipidemia     Hypertension        Current Outpatient Prescriptions:     amLODIPine (NORVASC) 10 mg tablet, Take 10 mg by mouth daily, Disp: , Rfl: 1    ibuprofen (MOTRIN) 600 mg tablet, Take by mouth every 8 (eight) hours as needed for mild pain, Disp: , Rfl:     metFORMIN (GLUCOPHAGE) 1000 MG tablet, Take 1,000 mg by mouth 2 (two) times a day with meals, Disp: , Rfl:   omega-3-acid ethyl esters (LOVAZA) 1 g capsule, Take 2 g by mouth, Disp: , Rfl:     omeprazole (PriLOSEC) 40 MG capsule, Take 1 capsule by mouth daily, Disp: , Rfl:     oxybutynin (DITROPAN-XL) 10 MG 24 hr tablet, Take 10 mg by mouth daily at bedtime, Disp: , Rfl:     rosuvastatin (CRESTOR) 20 MG tablet, Take 20 mg by mouth daily, Disp: , Rfl:     valsartan (DIOVAN) 320 MG tablet, Take 320 mg by mouth daily, Disp: , Rfl:   No Known Allergies  Past Surgical History:   Procedure Laterality Date    APPENDECTOMY      CARPAL TUNNEL RELEASE      COLONOSCOPY      CYSTOSCOPY W/ DILATION OF BLADDER  01/09/2017    DR KILO LENZ, [facility]     EGD AND COLONOSCOPY N/A 2/8/2018    Procedure: EGD with biopsy AND COLONOSCOPY with polypectomy with hemo clip application;  Surgeon: Lisa Torres MD;  Location: [facility]; Service: Gastroenterology    NEUROPLASTY / TRANSPOSITION MEDIAN NERVE AT CARPAL TUNNEL      LEFT & RIGHT           Review of Systems   Constitutional: Negative  HENT: Negative  Eyes: Negative  Respiratory: Negative  Cardiovascular: Negative  Gastrointestinal: Negative  Negative for anal bleeding (Previous history of external hemorrhoids)  Endocrine: Negative  Genitourinary: Positive for difficulty urinating (History of overactive bladder)  Musculoskeletal: Positive for back pain (With intermittent sciatic type symptoms)  Allergic/Immunologic: Negative  Neurological: Negative  Hematological: Negative  Psychiatric/Behavioral: Negative  Objective:      /76 (BP Location: Left arm, Patient Position: Sitting, Cuff Size: Large)   Pulse 80   Temp 98 1 °F (36 7 °C) (Oral)   Ht 5' 5" (1 651 m)   Wt 83 8 kg (184 lb 12 8 oz)   SpO2 97%   BMI 30 75 kg/m²          Physical Exam   Constitutional: He is oriented to person, place, and time  He appears well-developed and well-nourished  No distress     HENT:   Head: Normocephalic and atraumatic  Right Ear: External ear normal    Left Ear: External ear normal    Eyes: Conjunctivae and EOM are normal  Pupils are equal, round, and reactive to light  No scleral icterus  Neck: Normal range of motion  Neck supple  No JVD present  No tracheal deviation present  Cardiovascular: Normal rate, regular rhythm and normal heart sounds  Pulses are no weak pulses  Pulses:       Dorsalis pedis pulses are 2+ on the right side, and 2+ on the left side  Posterior tibial pulses are 2+ on the right side, and 2+ on the left side  Pulmonary/Chest: Effort normal and breath sounds normal  He has no wheezes  He has no rales  Abdominal: Soft  Bowel sounds are normal  He exhibits no distension  There is no tenderness  Musculoskeletal: Normal range of motion  He exhibits no edema or deformity  Feet:   Right Foot:   Skin Integrity: Negative for ulcer, skin breakdown, erythema, warmth, callus or dry skin  Left Foot:   Skin Integrity: Negative for ulcer, skin breakdown, erythema, warmth, callus or dry skin  Lymphadenopathy:     He has no cervical adenopathy  Neurological: He is alert and oriented to person, place, and time  No cranial nerve deficit  Coordination normal    Skin: Skin is warm and dry  No rash noted  Psychiatric: He has a normal mood and affect  Patient's shoes and socks removed  Right Foot/Ankle   Right Foot Inspection  Skin Exam: skin normal skin not intact, no dry skin, no warmth, no callus, no erythema, no maceration, no abnormal color, no pre-ulcer, no ulcer and no callus                          Toe Exam: ROM and strength within normal limitsno swelling and  no right toe deformity  Sensory   Vibration: intact  Proprioception: intact   Monofilament testing: intact  Vascular  Capillary refills: < 3 seconds  The right DP pulse is 2+  The right PT pulse is 2+     Right Toe  - Comprehensive Exam  Ecchymosis: none  Arch: normal  Hammertoes: absent  Claw Toes: absent  Swelling: none   Tenderness: none         Left Foot/Ankle  Left Foot Inspection  Skin Exam: skin normalskin not intact, no dry skin, no warmth, no erythema, no maceration, normal color, no pre-ulcer, no ulcer and no callus                         Toe Exam: ROM and strength within normal limitsno swelling, no erythema and no left toe deformity                   Sensory   Vibration: intact  Proprioception: intact  Monofilament: intact  Vascular  Capillary refills: < 3 seconds  The left DP pulse is 2+  The left PT pulse is 2+  Left Toe  - Comprehensive Exam  Ecchymosis: none  Arch: normal  Hammertoes: absent  Claw toes: absent  Swelling: none   Tenderness: none       Assign Risk Category:  No deformity present; No loss of protective sensation;  No weak pulses       Risk: 0

## 2018-03-24 NOTE — ASSESSMENT & PLAN NOTE
Patient encouraged to continue ibuprofen as necessary    May need combination of muscle relaxants and or physical therapy on case by case basis

## 2018-03-24 NOTE — ASSESSMENT & PLAN NOTE
Patient was taking 2 different medications for his overactive bladder  We will continue his oxybutynin

## 2018-03-24 NOTE — ASSESSMENT & PLAN NOTE
Diabetes appears to be well controlled on current regimen with metformin alone and diet  We will continue adjunctive treatments  We will obtain follow-up urine for microalbumin will monitor his lipid profile yearly and follow-up hemoglobin A1c in 3 to 4 months

## 2018-03-24 NOTE — ASSESSMENT & PLAN NOTE
Lipids are near goal   HDL cholesterol slightly low total cholesterol is acceptable and LDL cholesterol acceptable triglycerides are mildly elevated    Will continue Crestor at current dosing

## 2018-04-01 DIAGNOSIS — K21.9 CHRONIC GERD: Primary | ICD-10-CM

## 2018-04-02 RX ORDER — OMEPRAZOLE 40 MG/1
CAPSULE, DELAYED RELEASE ORAL
Qty: 30 CAPSULE | Refills: 3 | Status: SHIPPED | OUTPATIENT
Start: 2018-04-02 | End: 2018-08-09 | Stop reason: SDUPTHER

## 2018-04-06 ENCOUNTER — TELEPHONE (OUTPATIENT)
Dept: GASTROENTEROLOGY | Facility: CLINIC | Age: 68
End: 2018-04-06

## 2018-04-06 NOTE — TELEPHONE ENCOUNTER
Dr Ashley Swift pt    Pt is requesting his colonoscopy results on 02/08 to be faxed to Dr Snow Michael   Please fax to [phone]

## 2018-06-15 DIAGNOSIS — E11.9 TYPE 2 DIABETES MELLITUS WITHOUT COMPLICATION, WITHOUT LONG-TERM CURRENT USE OF INSULIN (HCC): Primary | ICD-10-CM

## 2018-06-19 ENCOUNTER — CLINICAL SUPPORT (OUTPATIENT)
Dept: INTERNAL MEDICINE CLINIC | Facility: CLINIC | Age: 68
End: 2018-06-19
Payer: COMMERCIAL

## 2018-06-19 DIAGNOSIS — E11.41 TYPE 2 DIABETES MELLITUS WITH MONONEUROPATHY (HCC): ICD-10-CM

## 2018-06-19 DIAGNOSIS — M47.816 OSTEOARTHRITIS OF LUMBAR SPINE, UNSPECIFIED SPINAL OSTEOARTHRITIS COMPLICATION STATUS: ICD-10-CM

## 2018-06-19 DIAGNOSIS — I10 ESSENTIAL HYPERTENSION, BENIGN: Primary | ICD-10-CM

## 2018-06-19 PROCEDURE — 36415 COLL VENOUS BLD VENIPUNCTURE: CPT

## 2018-06-20 DIAGNOSIS — E78.00 HYPERCHOLESTEROLEMIA: Primary | ICD-10-CM

## 2018-06-20 LAB
ALBUMIN SERPL-MCNC: 4.2 G/DL (ref 3.6–5.1)
ALBUMIN/CREAT UR: 4 MCG/MG CREAT
ALBUMIN/GLOB SERPL: 1.4 (CALC) (ref 1–2.5)
ALP SERPL-CCNC: 73 U/L (ref 40–115)
ALT SERPL-CCNC: 19 U/L (ref 9–46)
AST SERPL-CCNC: 16 U/L (ref 10–35)
BILIRUB SERPL-MCNC: 0.6 MG/DL (ref 0.2–1.2)
BUN SERPL-MCNC: 18 MG/DL (ref 7–25)
BUN/CREAT SERPL: ABNORMAL (CALC) (ref 6–22)
CALCIUM SERPL-MCNC: 9.8 MG/DL (ref 8.6–10.3)
CHLORIDE SERPL-SCNC: 102 MMOL/L (ref 98–110)
CO2 SERPL-SCNC: 26 MMOL/L (ref 20–31)
CREAT SERPL-MCNC: 0.92 MG/DL (ref 0.7–1.25)
CREAT UR-MCNC: 146 MG/DL (ref 20–370)
ERYTHROCYTE [DISTWIDTH] IN BLOOD BY AUTOMATED COUNT: 13.9 % (ref 11–15)
EST. AVERAGE GLUCOSE BLD GHB EST-MCNC: 131 (CALC)
EST. AVERAGE GLUCOSE BLD GHB EST-SCNC: 7.3 (CALC)
GLOBULIN SER CALC-MCNC: 3.1 G/DL (CALC) (ref 1.9–3.7)
GLUCOSE SERPL-MCNC: 121 MG/DL (ref 65–99)
HBA1C MFR BLD: 6.2 % OF TOTAL HGB
HCT VFR BLD AUTO: 44.1 % (ref 38.5–50)
HGB BLD-MCNC: 15.1 G/DL (ref 13.2–17.1)
MCH RBC QN AUTO: 30.6 PG (ref 27–33)
MCHC RBC AUTO-ENTMCNC: 34.2 G/DL (ref 32–36)
MCV RBC AUTO: 89.5 FL (ref 80–100)
MICROALBUMIN UR-MCNC: 0.6 MG/DL
PLATELET # BLD AUTO: 258 THOUSAND/UL (ref 140–400)
PMV BLD REES-ECKER: 9.9 FL (ref 7.5–12.5)
POTASSIUM SERPL-SCNC: 4.5 MMOL/L (ref 3.5–5.3)
PROT SERPL-MCNC: 7.3 G/DL (ref 6.1–8.1)
RBC # BLD AUTO: 4.93 MILLION/UL (ref 4.2–5.8)
SL AMB EGFR AFRICAN AMERICAN: 99 ML/MIN/1.73M2
SL AMB EGFR NON AFRICAN AMERICAN: 85 ML/MIN/1.73M2
SODIUM SERPL-SCNC: 137 MMOL/L (ref 135–146)
WBC # BLD AUTO: 8.3 THOUSAND/UL (ref 3.8–10.8)

## 2018-06-20 RX ORDER — ROSUVASTATIN CALCIUM 20 MG/1
TABLET, COATED ORAL
Qty: 90 TABLET | Refills: 1 | Status: SHIPPED | OUTPATIENT
Start: 2018-06-20 | End: 2018-12-18 | Stop reason: SDUPTHER

## 2018-07-12 ENCOUNTER — OFFICE VISIT (OUTPATIENT)
Dept: INTERNAL MEDICINE CLINIC | Facility: CLINIC | Age: 68
End: 2018-07-12
Payer: COMMERCIAL

## 2018-07-12 VITALS
DIASTOLIC BLOOD PRESSURE: 80 MMHG | SYSTOLIC BLOOD PRESSURE: 136 MMHG | BODY MASS INDEX: 30.16 KG/M2 | HEIGHT: 65 IN | OXYGEN SATURATION: 96 % | WEIGHT: 181 LBS | HEART RATE: 63 BPM | TEMPERATURE: 98.8 F

## 2018-07-12 DIAGNOSIS — I42.0 DILATED CARDIOMYOPATHY (HCC): ICD-10-CM

## 2018-07-12 DIAGNOSIS — M47.816 SPONDYLOSIS OF LUMBAR REGION WITHOUT MYELOPATHY OR RADICULOPATHY: ICD-10-CM

## 2018-07-12 DIAGNOSIS — Z13.5 SCREENING FOR DIABETIC RETINOPATHY: Primary | ICD-10-CM

## 2018-07-12 DIAGNOSIS — N52.9 ERECTILE DYSFUNCTION, UNSPECIFIED ERECTILE DYSFUNCTION TYPE: ICD-10-CM

## 2018-07-12 DIAGNOSIS — I10 BENIGN ESSENTIAL HYPERTENSION: ICD-10-CM

## 2018-07-12 DIAGNOSIS — E11.41 TYPE 2 DIABETES MELLITUS WITH DIABETIC MONONEUROPATHY, WITHOUT LONG-TERM CURRENT USE OF INSULIN (HCC): ICD-10-CM

## 2018-07-12 PROCEDURE — 3079F DIAST BP 80-89 MM HG: CPT | Performed by: INTERNAL MEDICINE

## 2018-07-12 PROCEDURE — 3075F SYST BP GE 130 - 139MM HG: CPT | Performed by: INTERNAL MEDICINE

## 2018-07-12 PROCEDURE — 99214 OFFICE O/P EST MOD 30 MIN: CPT | Performed by: INTERNAL MEDICINE

## 2018-07-12 RX ORDER — SILDENAFIL 100 MG/1
100 TABLET, FILM COATED ORAL DAILY PRN
Qty: 3 TABLET | Refills: 5 | Status: SHIPPED | OUTPATIENT
Start: 2018-07-12

## 2018-07-12 RX ORDER — AMLODIPINE BESYLATE 10 MG/1
10 TABLET ORAL DAILY
Qty: 90 TABLET | Refills: 1 | Status: SHIPPED | OUTPATIENT
Start: 2018-07-12 | End: 2018-10-26 | Stop reason: SDUPTHER

## 2018-07-12 NOTE — PROGRESS NOTES
Assessment/Plan:    Dilated cardiomyopathy ([facility] )  Patient without any complaints of dyspnea, orthopnea, palpitations, chest pain  Type 2 diabetes mellitus with diabetic mononeuropathy, without long-term current use of insulin ([facility])  Lab Results   Component Value Date    HGBA1C 6 2 (H) 06/19/2018       No results for input(s): POCGLU in the last 72 hours  Blood Sugar Average: Last 72 hrs: Well controlled on current medications  He had been taking his metformin once daily but then he read the label and so he should be taking it twice daily and resumed b i d  Dosing prior to his labs being drawn  Benign essential hypertension    Blood pressure remains controlled  No changes to current     Osteoarthritis of lumbar spine    Encouraged to continue exercising especially to help build up opposing muscles in the abdomen  Erectile dysfunction    Given a prescription for sildenafil 100 mg as needed    Screening for diabetic retinopathy    Referral to Ophthalmology  Diagnoses and all orders for this visit:    Screening for diabetic retinopathy  -     Ambulatory referral to Ophthalmology; Future  -     amLODIPine (NORVASC) 10 mg tablet; Take 1 tablet (10 mg total) by mouth daily  -     Hemoglobin A1C; Future  -     Lipid panel; Future  -     Comprehensive metabolic panel; Future  -     CBC; Future    Type 2 diabetes mellitus with diabetic mononeuropathy, without long-term current use of insulin ([facility] )  -     Ambulatory referral to Ophthalmology; Future  -     amLODIPine (NORVASC) 10 mg tablet; Take 1 tablet (10 mg total) by mouth daily  -     Hemoglobin A1C; Future  -     Lipid panel; Future  -     Comprehensive metabolic panel; Future  -     CBC; Future  -     sildenafil (VIAGRA) 100 mg tablet; Take 1 tablet (100 mg total) by mouth daily as needed for erectile dysfunction    Benign essential hypertension  -     Ambulatory referral to Ophthalmology; Future  -     amLODIPine (NORVASC) 10 mg tablet;  Take 1 tablet (10 mg total) by mouth daily  -     Hemoglobin A1C; Future  -     Lipid panel; Future  -     Comprehensive metabolic panel; Future  -     CBC; Future    Dilated cardiomyopathy ([facility] )  -     Ambulatory referral to Ophthalmology; Future  -     amLODIPine (NORVASC) 10 mg tablet; Take 1 tablet (10 mg total) by mouth daily  -     Hemoglobin A1C; Future  -     Lipid panel; Future  -     Comprehensive metabolic panel; Future  -     CBC; Future    Spondylosis of lumbar region without myelopathy or radiculopathy  -     Ambulatory referral to Ophthalmology; Future  -     amLODIPine (NORVASC) 10 mg tablet; Take 1 tablet (10 mg total) by mouth daily  -     Hemoglobin A1C; Future  -     Lipid panel; Future  -     Comprehensive metabolic panel; Future  -     CBC; Future    Erectile dysfunction, unspecified erectile dysfunction type          Subjective:      Patient ID: Aga Cooper is a 76 y o  male  Patient presents for follow-up visit  He offers no major complaints of any dyspnea, orthopnea, PND etc   He does complain of occasional sciatica especially when driving to [place]  His blood sugar has been stable  He had been taking his metformin only once a day but then looked at the label on his medication and so he need to be taking it twice a day  He resumed taking it b i d  Prior to his most recent labs which revealed a hemoglobin A1c of 6 2%  The remainder of his labs are stable  Urine was negative for any significant microalbuminuria  He is asking about using some medication for erectile dysfunction  He is able to achieve an erection but has some difficulty in maintaining  His reflux disease has been relatively stable as well  He does state that  When he awakens  In the morning he experiences some coughing  He denies any  associated dyspepsia          Family History   Problem Relation Age of Onset    Heart disease Mother     Hypertension Mother     Ulcers Father     Stomach cancer Father Social History     Social History    Marital status: /Civil Union     Spouse name: N/A    Number of children: N/A    Years of education: N/A     Occupational History    Not on file  Social History Main Topics    Smoking status: Never Smoker    Smokeless tobacco: Never Used    Alcohol use No    Drug use: No    Sexual activity: Not on file     Other Topics Concern    Not on file     Social History Narrative    No narrative on file     Past Medical History:   Diagnosis Date    Arthritis     OSTEOARTHRITIS OF LUMBAR SPINE    Carpal tunnel syndrome     Diabetes mellitus ([address] )     Elevated prostate specific antigen (PSA)     Erectile dysfunction     GERD (gastroesophageal reflux disease)     History of BPH     Hx of adenomatous colonic polyps     Hyperlipidemia        Current Outpatient Prescriptions:     amLODIPine (NORVASC) 10 mg tablet, Take 1 tablet (10 mg total) by mouth daily, Disp: 90 tablet, Rfl: 1    ibuprofen (MOTRIN) 600 mg tablet, Take by mouth every 8 (eight) hours as needed for mild pain, Disp: , Rfl:     metFORMIN (GLUCOPHAGE) 1000 MG tablet, TAKE 1 TABLET TWICE DAILY  , Disp: 180 tablet, Rfl: 1    oxybutynin (DITROPAN-XL) 10 MG 24 hr tablet, Take 10 mg by mouth daily at bedtime, Disp: , Rfl:     rosuvastatin (CRESTOR) 20 MG tablet, TAKE 1 TABLET DAILY  , Disp: 90 tablet, Rfl: 1    valsartan (DIOVAN) 320 MG tablet, Take 320 mg by mouth daily, Disp: , Rfl:     omega-3-acid ethyl esters (LOVAZA) 1 g capsule, Take 2 g by mouth, Disp: , Rfl:     omeprazole (PriLOSEC) 40 MG capsule, TAKE 1 CAPSULE DAILY, Disp: 30 capsule, Rfl: 3    sildenafil (VIAGRA) 100 mg tablet, Take 1 tablet (100 mg total) by mouth daily as needed for erectile dysfunction, Disp: 3 tablet, Rfl: 5  No Known Allergies  Past Surgical History:   Procedure Laterality Date    APPENDECTOMY      CARPAL TUNNEL RELEASE      COLONOSCOPY      CYSTOSCOPY W/ DILATION OF BLADDER  01/09/2017    DR Jennifer Lazo [facility]     EGD AND COLONOSCOPY N/A 2/8/2018    Procedure: EGD with biopsy AND COLONOSCOPY with polypectomy with hemo clip application;  Surgeon: Shade Antony MD;  Location: [facility]; Service: Gastroenterology    NEUROPLASTY / TRANSPOSITION MEDIAN NERVE AT CARPAL TUNNEL      LEFT & RIGHT         Review of Systems   Constitutional: Negative  HENT: Negative  Eyes: Negative  Respiratory: Negative  Cardiovascular: Negative  Gastrointestinal: Negative  Endocrine: Negative  Genitourinary: Negative  Musculoskeletal: Positive for back pain (Occasional right-sided sciatica especially when driving long distances)  Neurological: Negative  Psychiatric/Behavioral: Negative  All other systems reviewed and are negative  Objective:      /80 (BP Location: Left arm, Patient Position: Sitting, Cuff Size: Adult)   Pulse 63   Temp 98 8 °F (37 1 °C) (Oral)   Ht 5' 5" (1 651 m)   Wt 82 1 kg (181 lb)   SpO2 96%   BMI 30 12 kg/m²          Physical Exam   Constitutional: He is oriented to person, place, and time  He appears well-developed and well-nourished  HENT:   Head: Normocephalic and atraumatic  Eyes: Conjunctivae and EOM are normal  Pupils are equal, round, and reactive to light  Neck: Neck supple  No JVD present  No tracheal deviation present  No thyromegaly present  Cardiovascular: Normal rate, regular rhythm, normal heart sounds and intact distal pulses  No murmur heard  Pulmonary/Chest: Breath sounds normal  No respiratory distress  He has no rales  Abdominal: Soft  Bowel sounds are normal  He exhibits no distension  Musculoskeletal: Normal range of motion  He exhibits no edema or deformity  Lymphadenopathy:     He has no cervical adenopathy  Neurological: He is alert and oriented to person, place, and time  Grossly, no focal neurological deficits   Skin: Skin is warm and dry     Psychiatric: He has a normal mood and affect  Thought content normal    Vitals reviewed

## 2018-07-12 NOTE — ASSESSMENT & PLAN NOTE
Lab Results   Component Value Date    HGBA1C 6 2 (H) 06/19/2018       No results for input(s): POCGLU in the last 72 hours  Blood Sugar Average: Last 72 hrs: Well controlled on current medications  He had been taking his metformin once daily but then he read the label and so he should be taking it twice daily and resumed b i d  Dosing prior to his labs being drawn

## 2018-07-17 ENCOUNTER — OFFICE VISIT (OUTPATIENT)
Dept: GASTROENTEROLOGY | Facility: CLINIC | Age: 68
End: 2018-07-17
Payer: COMMERCIAL

## 2018-07-17 VITALS
HEART RATE: 77 BPM | DIASTOLIC BLOOD PRESSURE: 78 MMHG | TEMPERATURE: 97 F | WEIGHT: 183 LBS | BODY MASS INDEX: 30.49 KG/M2 | HEIGHT: 65 IN | SYSTOLIC BLOOD PRESSURE: 139 MMHG

## 2018-07-17 DIAGNOSIS — D12.2 ADENOMATOUS POLYP OF ASCENDING COLON: ICD-10-CM

## 2018-07-17 DIAGNOSIS — K21.9 CHRONIC GERD: Primary | ICD-10-CM

## 2018-07-17 DIAGNOSIS — A04.8 H. PYLORI INFECTION: ICD-10-CM

## 2018-07-17 PROCEDURE — 99214 OFFICE O/P EST MOD 30 MIN: CPT | Performed by: INTERNAL MEDICINE

## 2018-07-17 NOTE — PROGRESS NOTES
[facility] - Outpatient Follow-up Note  Darius Mary 76 y o  male MRN: [id number]  Encounter: [id number]          ASSESSMENT AND PLAN:      1  Chronic GERD  His reflux symptoms well controlled with omeprazole 40 milligram daily  We reviewed reflux precautions  2  Adenomatous polyp of ascending colon   -He had large polyps removed in February 2018  Surveillance colonoscopy is recommended in February 2019     3   H pylori induced gastritis -he completed quadruple therapy with Pylera  I asked him to stop taking omeprazole for 2 weeks and check stool antigen for H pylori to confirm eradication  ______________________________________________________________________    SUBJECTIVE:  57-year-old male with history of colon polyp and H pylori induced gastritis here for follow-up  Reflux symptoms are well controlled with omeprazole  He completed quadruple therapy for H pylori infection     number [id number] used  REVIEW OF SYSTEMS IS OTHERWISE NEGATIVE  Historical Information   Past Medical History:   Diagnosis Date    Arthritis     OSTEOARTHRITIS OF LUMBAR SPINE    Carpal tunnel syndrome     Diabetes mellitus ([address] )     Elevated prostate specific antigen (PSA)     Erectile dysfunction     GERD (gastroesophageal reflux disease)     History of BPH     Hx of adenomatous colonic polyps     Hyperlipidemia      Past Surgical History:   Procedure Laterality Date    APPENDECTOMY      CARPAL TUNNEL RELEASE      COLONOSCOPY      CYSTOSCOPY W/ DILATION OF BLADDER  01/09/2017    DR KILO LENZ, [facility]     EGD AND COLONOSCOPY N/A 2/8/2018    Procedure: EGD with biopsy AND COLONOSCOPY with polypectomy with hemo clip application;  Surgeon: Rao Duran MD;  Location: [facility];   Service: Gastroenterology    NEUROPLASTY / TRANSPOSITION MEDIAN NERVE AT CARPAL TUNNEL      LEFT & RIGHT     Social History   History   Alcohol Use No History   Drug Use No     History   Smoking Status    Never Smoker   Smokeless Tobacco    Never Used     Family History   Problem Relation Age of Onset    Heart disease Mother     Hypertension Mother     Ulcers Father     Stomach cancer Father        Meds/Allergies       Current Outpatient Prescriptions:     amLODIPine (NORVASC) 10 mg tablet    ibuprofen (MOTRIN) 600 mg tablet    metFORMIN (GLUCOPHAGE) 1000 MG tablet    omega-3-acid ethyl esters (LOVAZA) 1 g capsule    omeprazole (PriLOSEC) 40 MG capsule    oxybutynin (DITROPAN-XL) 10 MG 24 hr tablet    rosuvastatin (CRESTOR) 20 MG tablet    sildenafil (VIAGRA) 100 mg tablet    valsartan (DIOVAN) 320 MG tablet    No Known Allergies        Objective     Blood pressure 139/78, pulse 77, temperature (!) 97 °F (36 1 °C), temperature source Tympanic, height 5' 5" (1 651 m), weight 83 kg (183 lb)  Body mass index is 30 45 kg/m²  PHYSICAL EXAM:      General Appearance:   Alert, cooperative, no distress   HEENT:   Normocephalic, atraumatic, anicteric      Neck:  Supple, symmetrical, trachea midline   Lungs:   Clear to auscultation bilaterally; no rales, rhonchi or wheezing; respirations unlabored    Heart[de-identified]   Regular rate and rhythm; no murmur, rub, or gallop  Abdomen:   Soft, non-tender, non-distended; normal bowel sounds; no masses, no organomegaly    Genitalia:   Deferred    Rectal:   Deferred    Extremities:  No cyanosis, clubbing or edema    Pulses:  2+ and symmetric    Skin:  No jaundice, rashes, or lesions    Lymph nodes:  No palpable cervical lymphadenopathy        Lab Results:   No visits with results within 1 Day(s) from this visit     Latest known visit with results is:   Orders Only on 06/19/2018   Component Date Value    SL AMB GLUCOSE 06/19/2018 121*    BUN 06/19/2018 18     Creatinine, Serum 06/19/2018 0 92     eGFR Non  06/19/2018 85     SL AMB EGFR  AMER* 06/19/2018 99     SL AMB BUN/CREATININE RA* 91/48/3959 NOT APPLICABLE     SL AMB SODIUM 06/19/2018 137     SL AMB POTASSIUM 06/19/2018 4 5     SL AMB CHLORIDE 06/19/2018 102     SL AMB CARBON DIOXIDE 06/19/2018 26     SL AMB CALCIUM 06/19/2018 9 8     SL AMB PROTEIN, TOTAL 06/19/2018 7 3     Serum Albumin 06/19/2018 4 2     SL AMB GLOBULIN 06/19/2018 3 1     SL AMB ALBUMIN/GLOBULIN * 06/19/2018 1 4     SL AMB BILIRUBIN, TOTAL 06/19/2018 0 6     SL AMB ALKALINE PHOSPHAT* 06/19/2018 73     SL AMB AST 06/19/2018 16     SL AMB ALT 06/19/2018 19     Creatinine, Urine 06/19/2018 146     Microalbum  ,U,Random 06/19/2018 0 6     Microalb/Creat Ratio 06/19/2018 4     SL AMB LAB WHITE BLOOD C* 06/19/2018 8 3     SL AMB LAB RED BLOOD DENEEN* 06/19/2018 4 93     Hemoglobin 06/19/2018 15 1     Hematocrit 06/19/2018 44 1     MCV 06/19/2018 89 5     MCH 06/19/2018 30 6     MCHC 06/19/2018 34 2     RDW 06/19/2018 13 9     Platelet Count 14/32/9545 258     SL AMB MPV 06/19/2018 9 9     Hemoglobin A1C 06/19/2018 6 2*    Estimated Average Glucose 06/19/2018 131     Estimated Average Glucos* 06/19/2018 7 3

## 2018-08-07 ENCOUNTER — CLINICAL SUPPORT (OUTPATIENT)
Dept: INTERNAL MEDICINE CLINIC | Facility: CLINIC | Age: 68
End: 2018-08-07

## 2018-08-07 DIAGNOSIS — A04.8 H. PYLORI INFECTION: Primary | ICD-10-CM

## 2018-08-09 DIAGNOSIS — K21.9 CHRONIC GERD: ICD-10-CM

## 2018-08-09 RX ORDER — OMEPRAZOLE 40 MG/1
CAPSULE, DELAYED RELEASE ORAL
Qty: 30 CAPSULE | Refills: 3 | Status: SHIPPED | OUTPATIENT
Start: 2018-08-09 | End: 2018-10-30 | Stop reason: SDUPTHER

## 2018-08-28 LAB
LEFT EYE DIABETIC RETINOPATHY: NORMAL
RIGHT EYE DIABETIC RETINOPATHY: NORMAL

## 2018-08-28 PROCEDURE — 3072F LOW RISK FOR RETINOPATHY: CPT | Performed by: INTERNAL MEDICINE

## 2018-09-09 DIAGNOSIS — I10 ESSENTIAL HYPERTENSION: Primary | ICD-10-CM

## 2018-09-09 PROCEDURE — 4010F ACE/ARB THERAPY RXD/TAKEN: CPT | Performed by: INTERNAL MEDICINE

## 2018-09-09 RX ORDER — VALSARTAN 320 MG/1
TABLET ORAL
Qty: 90 TABLET | Refills: 2 | Status: CANCELLED | OUTPATIENT
Start: 2018-09-09

## 2018-09-09 RX ORDER — CANDESARTAN 32 MG/1
32 TABLET ORAL DAILY
Qty: 90 TABLET | Refills: 1 | Status: SHIPPED | OUTPATIENT
Start: 2018-09-09 | End: 2019-03-11 | Stop reason: SDUPTHER

## 2018-10-12 ENCOUNTER — CLINICAL SUPPORT (OUTPATIENT)
Dept: INTERNAL MEDICINE CLINIC | Facility: CLINIC | Age: 68
End: 2018-10-12
Payer: COMMERCIAL

## 2018-10-12 DIAGNOSIS — E11.41 TYPE 2 DIABETES MELLITUS WITH DIABETIC MONONEUROPATHY, WITHOUT LONG-TERM CURRENT USE OF INSULIN (HCC): ICD-10-CM

## 2018-10-12 DIAGNOSIS — M47.816 SPONDYLOSIS OF LUMBAR REGION WITHOUT MYELOPATHY OR RADICULOPATHY: ICD-10-CM

## 2018-10-12 DIAGNOSIS — I10 BENIGN ESSENTIAL HYPERTENSION: ICD-10-CM

## 2018-10-12 DIAGNOSIS — Z13.5 SCREENING FOR DIABETIC RETINOPATHY: Primary | ICD-10-CM

## 2018-10-12 DIAGNOSIS — I42.0 DILATED CARDIOMYOPATHY (HCC): ICD-10-CM

## 2018-10-12 PROCEDURE — 36415 COLL VENOUS BLD VENIPUNCTURE: CPT

## 2018-10-13 LAB
ALBUMIN SERPL-MCNC: 4.2 G/DL (ref 3.6–5.1)
ALBUMIN/GLOB SERPL: 1.5 (CALC) (ref 1–2.5)
ALP SERPL-CCNC: 68 U/L (ref 40–115)
ALT SERPL-CCNC: 15 U/L (ref 9–46)
AST SERPL-CCNC: 17 U/L (ref 10–35)
BILIRUB SERPL-MCNC: 0.6 MG/DL (ref 0.2–1.2)
BUN SERPL-MCNC: 17 MG/DL (ref 7–25)
BUN/CREAT SERPL: ABNORMAL (CALC) (ref 6–22)
CALCIUM SERPL-MCNC: 9.2 MG/DL (ref 8.6–10.3)
CHLORIDE SERPL-SCNC: 104 MMOL/L (ref 98–110)
CHOLEST SERPL-MCNC: 133 MG/DL
CHOLEST/HDLC SERPL: 3.7 (CALC)
CO2 SERPL-SCNC: 23 MMOL/L (ref 20–32)
CREAT SERPL-MCNC: 0.83 MG/DL (ref 0.7–1.25)
ERYTHROCYTE [DISTWIDTH] IN BLOOD BY AUTOMATED COUNT: 13.7 % (ref 11–15)
EST. AVERAGE GLUCOSE BLD GHB EST-MCNC: 146 (CALC)
EST. AVERAGE GLUCOSE BLD GHB EST-SCNC: 8.1 (CALC)
GLOBULIN SER CALC-MCNC: 2.8 G/DL (CALC) (ref 1.9–3.7)
GLUCOSE SERPL-MCNC: 130 MG/DL (ref 65–99)
HBA1C MFR BLD: 6.7 % OF TOTAL HGB
HCT VFR BLD AUTO: 42.9 % (ref 38.5–50)
HDLC SERPL-MCNC: 36 MG/DL
HGB BLD-MCNC: 14.4 G/DL (ref 13.2–17.1)
LDLC SERPL CALC-MCNC: 65 MG/DL (CALC)
MCH RBC QN AUTO: 30 PG (ref 27–33)
MCHC RBC AUTO-ENTMCNC: 33.6 G/DL (ref 32–36)
MCV RBC AUTO: 89.4 FL (ref 80–100)
NONHDLC SERPL-MCNC: 97 MG/DL (CALC)
PLATELET # BLD AUTO: 246 THOUSAND/UL (ref 140–400)
PMV BLD REES-ECKER: 10.3 FL (ref 7.5–12.5)
POTASSIUM SERPL-SCNC: 4.3 MMOL/L (ref 3.5–5.3)
PROT SERPL-MCNC: 7 G/DL (ref 6.1–8.1)
RBC # BLD AUTO: 4.8 MILLION/UL (ref 4.2–5.8)
SL AMB EGFR AFRICAN AMERICAN: 105 ML/MIN/1.73M2
SL AMB EGFR NON AFRICAN AMERICAN: 90 ML/MIN/1.73M2
SODIUM SERPL-SCNC: 138 MMOL/L (ref 135–146)
TRIGL SERPL-MCNC: 275 MG/DL
WBC # BLD AUTO: 7.6 THOUSAND/UL (ref 3.8–10.8)

## 2018-10-26 ENCOUNTER — OFFICE VISIT (OUTPATIENT)
Dept: INTERNAL MEDICINE CLINIC | Facility: CLINIC | Age: 68
End: 2018-10-26
Payer: COMMERCIAL

## 2018-10-26 VITALS
HEIGHT: 65 IN | DIASTOLIC BLOOD PRESSURE: 80 MMHG | HEART RATE: 77 BPM | WEIGHT: 185.2 LBS | OXYGEN SATURATION: 98 % | SYSTOLIC BLOOD PRESSURE: 140 MMHG | TEMPERATURE: 99.2 F | BODY MASS INDEX: 30.86 KG/M2

## 2018-10-26 DIAGNOSIS — I10 BENIGN ESSENTIAL HYPERTENSION: ICD-10-CM

## 2018-10-26 DIAGNOSIS — Z13.5 SCREENING FOR DIABETIC RETINOPATHY: ICD-10-CM

## 2018-10-26 DIAGNOSIS — E78.2 MIXED HYPERLIPIDEMIA: ICD-10-CM

## 2018-10-26 DIAGNOSIS — B35.3 TINEA PEDIS OF RIGHT FOOT: ICD-10-CM

## 2018-10-26 DIAGNOSIS — Z23 NEED FOR PNEUMOCOCCAL VACCINATION: ICD-10-CM

## 2018-10-26 DIAGNOSIS — M47.816 SPONDYLOSIS OF LUMBAR REGION WITHOUT MYELOPATHY OR RADICULOPATHY: ICD-10-CM

## 2018-10-26 DIAGNOSIS — E11.41 TYPE 2 DIABETES MELLITUS WITH DIABETIC MONONEUROPATHY, WITHOUT LONG-TERM CURRENT USE OF INSULIN (HCC): Primary | ICD-10-CM

## 2018-10-26 DIAGNOSIS — I42.0 DILATED CARDIOMYOPATHY (HCC): ICD-10-CM

## 2018-10-26 PROCEDURE — 99214 OFFICE O/P EST MOD 30 MIN: CPT | Performed by: INTERNAL MEDICINE

## 2018-10-26 PROCEDURE — G0009 ADMIN PNEUMOCOCCAL VACCINE: HCPCS | Performed by: INTERNAL MEDICINE

## 2018-10-26 PROCEDURE — 90670 PCV13 VACCINE IM: CPT | Performed by: INTERNAL MEDICINE

## 2018-10-26 PROCEDURE — 3008F BODY MASS INDEX DOCD: CPT | Performed by: INTERNAL MEDICINE

## 2018-10-26 RX ORDER — AMLODIPINE BESYLATE 10 MG/1
10 TABLET ORAL DAILY
Qty: 90 TABLET | Refills: 1 | Status: SHIPPED | OUTPATIENT
Start: 2018-10-26 | End: 2019-09-07 | Stop reason: SDUPTHER

## 2018-10-26 RX ORDER — IBUPROFEN 600 MG/1
600 TABLET ORAL EVERY 8 HOURS PRN
Qty: 90 TABLET | Refills: 2 | Status: SHIPPED | OUTPATIENT
Start: 2018-10-26 | End: 2019-03-14 | Stop reason: SDUPTHER

## 2018-10-26 RX ORDER — PRENATAL VIT 91/IRON/FOLIC/DHA 28-975-200
COMBINATION PACKAGE (EA) ORAL 2 TIMES DAILY
Qty: 30 G | Refills: 0 | Status: SHIPPED | OUTPATIENT
Start: 2018-10-26 | End: 2019-06-17 | Stop reason: SDUPTHER

## 2018-10-26 NOTE — PROGRESS NOTES
Assessment/Plan:    Tinea pedis of right foot  Lamisil 1%    Osteoarthritis of lumbar spine  Ibuprofen as needed  We will refer the patient to physical therapy for low back exercises and conditioning     Screening for diabetic retinopathy  Given referral for the eye exam    Benign essential hypertension  Blood pressure is controlled no changes in current medication    Dilated cardiomyopathy (HCC)  No symptoms of cardiac decompensation    Type 2 diabetes mellitus with diabetic mononeuropathy, without long-term current use of insulin ([facility])  Lab Results   Component Value Date    HGBA1C 6 7 (H) 10/12/2018     Doing well on current therapy  No change in the medication noted at this time  Reviewed dietary restrictions with patient  No results for input(s): POCGLU in the last 72 hours  Blood Sugar Average: Last 72 hrs:         Diagnoses and all orders for this visit:    Type 2 diabetes mellitus with diabetic mononeuropathy, without long-term current use of insulin ([facility])  -     amLODIPine (NORVASC) 10 mg tablet; Take 1 tablet (10 mg total) by mouth daily for 180 days  -     CBC; Future  -     Comprehensive metabolic panel; Future  -     Hemoglobin A1C; Future  -     Microalbumin / creatinine urine ratio; Future    Mixed hyperlipidemia  -     CBC; Future  -     Comprehensive metabolic panel; Future  -     Hemoglobin A1C; Future  -     Microalbumin / creatinine urine ratio; Future    Benign essential hypertension  -     amLODIPine (NORVASC) 10 mg tablet; Take 1 tablet (10 mg total) by mouth daily for 180 days  -     CBC; Future  -     Comprehensive metabolic panel; Future  -     Hemoglobin A1C; Future  -     Microalbumin / creatinine urine ratio; Future    Spondylosis of lumbar region without myelopathy or radiculopathy  -     ibuprofen (MOTRIN) 600 mg tablet; Take 1 tablet (600 mg total) by mouth every 8 (eight) hours as needed for mild pain for up to 90 days  -     amLODIPine (NORVASC) 10 mg tablet;  Take 1 tablet (10 mg total) by mouth daily for 180 days  -     Ambulatory referral to Physical Therapy; Future  -     CBC; Future  -     Comprehensive metabolic panel; Future  -     Hemoglobin A1C; Future  -     Microalbumin / creatinine urine ratio; Future    Screening for diabetic retinopathy  -     amLODIPine (NORVASC) 10 mg tablet; Take 1 tablet (10 mg total) by mouth daily for 180 days  -     CBC; Future  -     Comprehensive metabolic panel; Future  -     Hemoglobin A1C; Future  -     Microalbumin / creatinine urine ratio; Future    Dilated cardiomyopathy (HCC)  -     amLODIPine (NORVASC) 10 mg tablet; Take 1 tablet (10 mg total) by mouth daily for 180 days  -     CBC; Future  -     Comprehensive metabolic panel; Future  -     Hemoglobin A1C; Future  -     Microalbumin / creatinine urine ratio; Future    Tinea pedis of right foot  -     terbinafine (LamISIL) 1 % cream; Apply topically 2 (two) times a day  -     CBC; Future  -     Comprehensive metabolic panel; Future  -     Hemoglobin A1C; Future  -     Microalbumin / creatinine urine ratio; Future          Subjective:      Patient ID: Tremaine Valente is a 76 y o  male  Patient presents for follow-up visit type 2 diabetes  Complains of some on going back pain and would like to have a referral for physical therapy  He denies any headaches or dyspnea  He denies any increase in peripheral edema  No history of orthopnea or PND  His labs are reviewed  Hemoglobin A1c is 6 7%  Cholesterol levels are at goal   HDL cholesterol is low triglycerides are elevated  Renal function is normal   Liver enzymes were normal   CBC is normal   He complains of a fungal infection of his large toenail    He denies any pain        Family History   Problem Relation Age of Onset    Heart disease Mother     Hypertension Mother     Ulcers Father     Stomach cancer Father      Social History     Social History    Marital status: /Civil Union     Spouse name: N/A    Number of children: N/A    Years of education: N/A     Occupational History    Not on file  Social History Main Topics    Smoking status: Never Smoker    Smokeless tobacco: Never Used    Alcohol use No    Drug use: No    Sexual activity: Not on file     Other Topics Concern    Not on file     Social History Narrative    No narrative on file     Past Medical History:   Diagnosis Date    Arthritis     OSTEOARTHRITIS OF LUMBAR SPINE    Carpal tunnel syndrome     [facility]     Elevated prostate specific antigen (PSA)     Erectile dysfunction     GERD (gastroesophageal reflux disease)     History of BPH     Hx of adenomatous colonic polyps     Hyperlipidemia        Current Outpatient Prescriptions:     amLODIPine (NORVASC) 10 mg tablet, Take 1 tablet (10 mg total) by mouth daily for 180 days, Disp: 90 tablet, Rfl: 1    candesartan (ATACAND) 32 MG tablet, Take 1 tablet (32 mg total) by mouth daily, Disp: 90 tablet, Rfl: 1    ibuprofen (MOTRIN) 600 mg tablet, Take 1 tablet (600 mg total) by mouth every 8 (eight) hours as needed for mild pain for up to 90 days, Disp: 90 tablet, Rfl: 2    metFORMIN (GLUCOPHAGE) 1000 MG tablet, TAKE 1 TABLET TWICE DAILY  , Disp: 180 tablet, Rfl: 1    omega-3-acid ethyl esters (LOVAZA) 1 g capsule, Take 2 g by mouth, Disp: , Rfl:     omeprazole (PriLOSEC) 40 MG capsule, TAKE 1 CAPSULE DAILY, Disp: 30 capsule, Rfl: 3    oxybutynin (DITROPAN-XL) 10 MG 24 hr tablet, Take 10 mg by mouth daily at bedtime, Disp: , Rfl:     rosuvastatin (CRESTOR) 20 MG tablet, TAKE 1 TABLET DAILY  , Disp: 90 tablet, Rfl: 1    sildenafil (VIAGRA) 100 mg tablet, Take 1 tablet (100 mg total) by mouth daily as needed for erectile dysfunction, Disp: 3 tablet, Rfl: 5    terbinafine (LamISIL) 1 % cream, Apply topically 2 (two) times a day, Disp: 30 g, Rfl: 0  No Known Allergies  Past Surgical History:   Procedure Laterality Date    APPENDECTOMY      CARPAL TUNNEL RELEASE      COLONOSCOPY      CYSTOSCOPY W/ DILATION OF BLADDER  01/09/2017    DR KILO LENZ, [facility]     EGD AND COLONOSCOPY N/A 2/8/2018    Procedure: EGD with biopsy AND COLONOSCOPY with polypectomy with hemo clip application;  Surgeon: Michell Phan MD;  Location: [facility]; Service: Gastroenterology    NEUROPLASTY / TRANSPOSITION MEDIAN NERVE AT CARPAL TUNNEL      LEFT & RIGHT         Review of Systems   Constitutional: Negative  HENT: Negative  Eyes: Negative  Respiratory: Negative  Cardiovascular: Negative  Gastrointestinal: Negative  Genitourinary: Negative  Musculoskeletal: Positive for back pain (Lumbar back pain without sciatica)  Skin:        Fungal infection of right great toe   Neurological: Negative  Hematological: Negative  Psychiatric/Behavioral: Negative  All other systems reviewed and are negative  Objective:      /80 (BP Location: Left arm, Patient Position: Sitting, Cuff Size: Standard)   Pulse 77   Temp 99 2 °F (37 3 °C) (Oral)   Ht 5' 5" (1 651 m)   Wt 84 kg (185 lb 3 2 oz)   SpO2 98%   BMI 30 82 kg/m²          Physical Exam   Constitutional: He is oriented to person, place, and time  He appears well-developed and well-nourished  HENT:   Head: Normocephalic and atraumatic  Eyes: Pupils are equal, round, and reactive to light  Conjunctivae and EOM are normal    Neck: Neck supple  No JVD present  No tracheal deviation present  Cardiovascular: Normal rate, regular rhythm, normal heart sounds and intact distal pulses  No murmur heard  Pulmonary/Chest: Effort normal  No respiratory distress  He has no wheezes  He has no rales  Abdominal: Soft  Bowel sounds are normal  He exhibits no distension  Musculoskeletal: Normal range of motion  He exhibits no edema  Lymphadenopathy:     He has no cervical adenopathy  Neurological: He is alert and oriented to person, place, and time  He has normal reflexes   Coordination normal    Skin: Skin is warm and dry  Psychiatric: He has a normal mood and affect  Thought content normal    Vitals reviewed

## 2018-10-27 NOTE — ASSESSMENT & PLAN NOTE
Ibuprofen as needed    We will refer the patient to physical therapy for low back exercises and conditioning

## 2018-10-27 NOTE — ASSESSMENT & PLAN NOTE
Lab Results   Component Value Date    HGBA1C 6 7 (H) 10/12/2018     Doing well on current therapy  No change in the medication noted at this time  Reviewed dietary restrictions with patient  No results for input(s): POCGLU in the last 72 hours      Blood Sugar Average: Last 72 hrs:

## 2018-10-30 DIAGNOSIS — K21.9 CHRONIC GERD: ICD-10-CM

## 2018-10-30 RX ORDER — OMEPRAZOLE 40 MG/1
40 CAPSULE, DELAYED RELEASE ORAL DAILY
Qty: 30 CAPSULE | Refills: 4 | Status: SHIPPED | OUTPATIENT
Start: 2018-10-30 | End: 2019-03-22 | Stop reason: SDUPTHER

## 2018-10-30 NOTE — TELEPHONE ENCOUNTER
Dr Kenton Khalil pt    Per the pharmacy, the pt needs a refill for omeprazole 40mg   Please send to [facility] on [address] [phone]

## 2018-11-07 ENCOUNTER — EVALUATION (OUTPATIENT)
Dept: PHYSICAL THERAPY | Facility: REHABILITATION | Age: 68
End: 2018-11-07
Payer: COMMERCIAL

## 2018-11-07 DIAGNOSIS — M47.816 SPONDYLOSIS OF LUMBAR REGION WITHOUT MYELOPATHY OR RADICULOPATHY: ICD-10-CM

## 2018-11-07 PROCEDURE — 97110 THERAPEUTIC EXERCISES: CPT | Performed by: PHYSICAL THERAPIST

## 2018-11-07 PROCEDURE — 97161 PT EVAL LOW COMPLEX 20 MIN: CPT | Performed by: PHYSICAL THERAPIST

## 2018-11-07 PROCEDURE — G8991 OTHER PT/OT GOAL STATUS: HCPCS | Performed by: PHYSICAL THERAPIST

## 2018-11-07 PROCEDURE — G8990 OTHER PT/OT CURRENT STATUS: HCPCS | Performed by: PHYSICAL THERAPIST

## 2018-11-07 NOTE — PROGRESS NOTES
PT Evaluation     Today's date: 2018  Patient name: George Montero  : 1950  MRN: [id number]  Referring provider: Ollie Wheeler MD  Dx: No diagnosis found  Assessment  Impairments: abnormal or restricted ROM, impaired physical strength, lacks appropriate home exercise program and pain with function    Assessment details: Patient presents complaining of low back pain that began a few years ago and he reports the pain keeps getting worse and worse  He reports a lot of pain with driving and prolonged driving, he has some pain with walking but usually is still able to walk a couple miles  Patient has primarily [address] speaking and has difficulty explaining in [place]  He reports pain down his right leg but he does also have numbness and tingling down his left leg at times, but only to above his knee  He is retired  He does have some radiation into his left buttock  He reports no change in status at different times of the day and it is more related to his positioning and activity  Patient needs constant cueing for correct form, reps and holds for stretches in HEP  Demonstrates decrease proximal LE strength and lumbar spine stabilization and excessive loss of flexibility in posterior chain musculature  Normal joint play and alignment, and some pain in SI region  Prognosis: good    Goals  ST- demonstrate compliancy with HEP in 1 week  Decrease pain to 3/10 @ worst and with activity, to improve functional capacity, in 2 weeks     Improve thoracolumbar ROM to minor limitations with no pain, within 3 weeks     LT- Improve FOTO score to specified value to improve patients perceived benefit of therapy, in 8 weeks   Improve lumbar spine stability demonstrated with MMT hip flexion score of 4+/5 with no lateral trunk lean compensation, within 6 weeks   Be able to drive >1 hour straight with no complaints of pain or fatigue, within 8 weeks     Plan  Patient would benefit from: skilled physical therapy  Referral necessary: No  Planned modality interventions: cryotherapy, TENS and thermotherapy: hydrocollator packs  Planned therapy interventions: ADL training, balance, balance/weight bearing training, gait training, manual therapy, joint mobilization, neuromuscular re-education, strengthening, stretching, therapeutic activities and therapeutic exercise  Frequency: 2x week  Duration in weeks: 8  Plan of Care beginning date: 2018  Plan of Care expiration date: 2019  Treatment plan discussed with: patient  Plan details: Physical therapy with focus on there ex and manual therapy to improve ability to complete tasks around the house and complete functional activities, use of modalities as needed           Subjective Evaluation    History of Present Illness  Mechanism of injury: Chronic onset >2 years   Pain  Current pain ratin  At best pain ratin  At worst pain ratin  Location: low back, B/L buttock   Quality: dull ache  Relieving factors: relaxation and rest  Aggravating factors: sitting and stair climbing    Treatments  Previous treatment: physical therapy  Patient Goals  Patient goals for therapy: decreased pain and independence with ADLs/IADLs  Patient goal: be able to drive with no complaints of pain or fatigue         Objective     General Comments     Lumbar Comments  Ttp along waistline at L3-L4 with radiation of pain laterally and distally into butt cheeks  Extreme tightness felt in glutes with palpation     rom  Thoracolumbar  Flexion, extension, R side bend moderately limited secondary to pain   L sidebend maximally limited   B/L rotation moderate limitations, also with pain     Tightness noted in posterior chain musculature in glutes, piriformis and hamstrings     mmt  Hip flexion 4/5 B/L w/ compensation  Hip abduction 4+/5 B/L  Hip adduction 4/5 B/L  Knee flexion L=4/5 R=4-/5   Knee extension L=4/5 R=4+/5    Special tests   (+) facet quadrant tests B/L   (-) SLR, crossed SLR    Joint play normal with limited muscle guarding, some pain with central P-A mobs at L3-L4          Precautions: Primarily Amharic speaking     Daily Treatment Diary     Manual              IASTM to paraspinals L1-L4             LE flexibility (hams, piriformis, SKTC)                                                        Exercise Diary              Bike             LTR             PB forward flexion stretch 3-way             PB side bend stretch B/L             Hip 3-way             Multifidus press             Prone press ups                                                                                                                                                                                           Modalities

## 2018-11-12 ENCOUNTER — OFFICE VISIT (OUTPATIENT)
Dept: PHYSICAL THERAPY | Facility: REHABILITATION | Age: 68
End: 2018-11-12
Payer: COMMERCIAL

## 2018-11-12 DIAGNOSIS — M47.816 SPONDYLOSIS OF LUMBAR REGION WITHOUT MYELOPATHY OR RADICULOPATHY: Primary | ICD-10-CM

## 2018-11-12 PROCEDURE — 97110 THERAPEUTIC EXERCISES: CPT | Performed by: PHYSICAL THERAPIST

## 2018-11-12 PROCEDURE — 97112 NEUROMUSCULAR REEDUCATION: CPT | Performed by: PHYSICAL THERAPIST

## 2018-11-12 PROCEDURE — 97140 MANUAL THERAPY 1/> REGIONS: CPT | Performed by: PHYSICAL THERAPIST

## 2018-11-12 NOTE — PROGRESS NOTES
Daily Note     Today's date: 2018  Patient name: Janis Mayers  : 1950  MRN: [id number]  Referring provider: Maria Elena Kwong MD  Dx:   Encounter Diagnosis     ICD-10-CM    1  Spondylosis of lumbar region without myelopathy or radiculopathy M47 816                   Subjective: Reports that his back still feels a little stiff, but a lot better than it felt last week      Objective: See treatment diary below    Precautions: Primarily Danish speaking     Daily Treatment Diary     Manual              IASTM to paraspinals L1-L4 nv            LE flexibility (hams, piriformis, SKTC) CW                                                       Exercise Diary              Bike 10'            LTR 10x10"            PB forward flexion stretch 3-way 5x10" ea            PB side bend stretch B/L 5x10" ea             Hip 3-way 15x ea B/L # nv           Multifidus press 20x ea OTB            Prone press ups  nv                                                                                                                                                                                         Modalities                                                           Assessment: Tolerated treatment well  Patient would benefit from continued PT, needs constant cueing for correct form and repetitions with most exercises  Demonstrated piriformis tightness today B/L  Plan: Continue per plan of care

## 2018-11-15 ENCOUNTER — OFFICE VISIT (OUTPATIENT)
Dept: PHYSICAL THERAPY | Facility: REHABILITATION | Age: 68
End: 2018-11-15
Payer: COMMERCIAL

## 2018-11-15 DIAGNOSIS — M47.816 SPONDYLOSIS OF LUMBAR REGION WITHOUT MYELOPATHY OR RADICULOPATHY: Primary | ICD-10-CM

## 2018-11-15 PROCEDURE — 97140 MANUAL THERAPY 1/> REGIONS: CPT | Performed by: PHYSICAL THERAPIST

## 2018-11-15 PROCEDURE — 97530 THERAPEUTIC ACTIVITIES: CPT | Performed by: PHYSICAL THERAPIST

## 2018-11-15 PROCEDURE — 97112 NEUROMUSCULAR REEDUCATION: CPT | Performed by: PHYSICAL THERAPIST

## 2018-11-15 PROCEDURE — 97110 THERAPEUTIC EXERCISES: CPT | Performed by: PHYSICAL THERAPIST

## 2018-11-15 NOTE — PROGRESS NOTES
Daily Note     Today's date: 11/15/2018  Patient name: Bang Bowling  : 1950  MRN: [id number]  Referring provider: Sal Alberts MD  Dx:   Encounter Diagnosis     ICD-10-CM    1  Spondylosis of lumbar region without myelopathy or radiculopathy M47 816                   Subjective: Reports feeling really good today, and has no pain in his back  Objective: See treatment diary below    Precautions: Primarily Slovak speaking     Daily Treatment Diary     Manual  11/12 11/15           IASTM to paraspinals L1-L4             LE flexibility (hams, piriformis, SKTC) CW CW                                                      Exercise Diary  11/12 11/15           Bike 10' 10'           LTR 10x10" 10x10"            PB forward flexion stretch 3-way 5x10" ea 5x10"            PB side bend stretch B/L 5x10" ea  5x10"           Hip 3-way 15x ea B/L 15x ea B/L 3#           Multifidus press 20x ea OTB 20x ea OTB           Prone press ups   nv                                                                                                                                                                                        Modalities                                                           Assessment: Tolerated treatment well  Patient would benefit from continued PT, did well with exercises today and demonstrated increased flexibility in LE manuals today  Patient educated on continuing with stretches at home  Plan: Continue per plan of care

## 2018-11-19 ENCOUNTER — OFFICE VISIT (OUTPATIENT)
Dept: PHYSICAL THERAPY | Facility: REHABILITATION | Age: 68
End: 2018-11-19
Payer: COMMERCIAL

## 2018-11-19 DIAGNOSIS — M47.816 SPONDYLOSIS OF LUMBAR REGION WITHOUT MYELOPATHY OR RADICULOPATHY: Primary | ICD-10-CM

## 2018-11-19 PROCEDURE — 97112 NEUROMUSCULAR REEDUCATION: CPT | Performed by: PHYSICAL THERAPIST

## 2018-11-19 PROCEDURE — 97140 MANUAL THERAPY 1/> REGIONS: CPT | Performed by: PHYSICAL THERAPIST

## 2018-11-19 PROCEDURE — 97530 THERAPEUTIC ACTIVITIES: CPT | Performed by: PHYSICAL THERAPIST

## 2018-11-19 PROCEDURE — 97110 THERAPEUTIC EXERCISES: CPT | Performed by: PHYSICAL THERAPIST

## 2018-11-19 NOTE — PROGRESS NOTES
Daily Note     Today's date: 2018  Patient name: Leanna Conte  : 1950  MRN: [id number]  Referring provider: Azul Manzo MD  Dx:   Encounter Diagnosis     ICD-10-CM    1  Spondylosis of lumbar region without myelopathy or radiculopathy M47 816                   Subjective: Reports his back feels good this morning, no change in status from last week     Objective: See treatment diary below    Precautions: Primarily Vietnamese speaking     Daily Treatment Diary     Manual  11/12 11/15 11/19          IASTM to paraspinals L1-L4             LE flexibility (hams, piriformis, SKTC) CW CW CW                                                     Exercise Diary  11/12 11/15 11/19          Bike 10' 10' 10'          LTR 10x10" 10x10"  10x10" D/C         PB forward flexion stretch 3-way 5x10" ea 5x10"  5x10" ea          PB side bend stretch B/L 5x10" ea  5x10" ea 5x10" ea          Hip 3-way storks 15x ea B/L 15x ea B/L 3# 10x ea GTB B/L          Multifidus press 20x ea OTB 20x ea OTB 20x ea GTB          Prone press ups    10x10"                                                                                                                                                                                       Modalities                                                             Assessment: Tolerated treatment well  Patient would benefit from continued PT, needed some cueing for correct form with prone press ups but able to do correctly with some vc        Plan: Continue per plan of care

## 2018-11-21 ENCOUNTER — OFFICE VISIT (OUTPATIENT)
Dept: PHYSICAL THERAPY | Facility: REHABILITATION | Age: 68
End: 2018-11-21
Payer: COMMERCIAL

## 2018-11-21 DIAGNOSIS — M47.816 SPONDYLOSIS OF LUMBAR REGION WITHOUT MYELOPATHY OR RADICULOPATHY: Primary | ICD-10-CM

## 2018-11-21 PROCEDURE — 97110 THERAPEUTIC EXERCISES: CPT

## 2018-11-21 PROCEDURE — 97530 THERAPEUTIC ACTIVITIES: CPT

## 2018-11-21 PROCEDURE — 97140 MANUAL THERAPY 1/> REGIONS: CPT

## 2018-11-21 PROCEDURE — 97112 NEUROMUSCULAR REEDUCATION: CPT

## 2018-11-21 NOTE — PROGRESS NOTES
Daily Note     Today's date: 2018  Patient name: Janis Mayers  : 1950  MRN: [id number]  Referring provider: Maria Elena Kwong MD  Dx:   Encounter Diagnosis     ICD-10-CM    1  Spondylosis of lumbar region without myelopathy or radiculopathy M47 816                   Subjective: Pt reports that back feels about the same today, no change overall  Objective: See treatment diary below      Precautions: Primarily Chinese speaking     Daily Treatment Diary     Manual  11/12 11/15 11/19 11/21         IASTM to paraspinals L1-L4             LE flexibility (hams, piriformis, SKTC) CW CW CW KP                                                    Exercise Diary  11/12 11/15 11/19 11/21         Bike 10' 10' 10' 8'         PB forward flexion stretch 3-way 5x10" ea 5x10"  5x10" ea 5x10" ea         PB side bend stretch B/L 5x10" ea  5x10" ea 5x10" ea 5x10" ea         Hip 3-way storks 15x ea B/L 15x ea B/L 3# 10x ea GTB B/L 15ea GTB b/l         Multifidus press 20x ea OTB 20x ea OTB 20x ea GTB 20x ea GTB         Prone press ups    10x10" 10x10"                                                                                                                                                                                      Modalities                                                           Assessment: Tolerated treatment well  Patient would benefit from continued PT  Pt continues to require cuing for counting throughout exercise, has tendency to lose count and over-perform exercises  Pt tolerated exercises well, noted relief with stretching  Pt  able to complete all exercises with no increase in pain during or after session  Pt 1:1 with PTA [id number], IEP for remainder  Pt arrived late for session  Plan: Continue per plan of care

## 2018-11-26 ENCOUNTER — APPOINTMENT (OUTPATIENT)
Dept: PHYSICAL THERAPY | Facility: REHABILITATION | Age: 68
End: 2018-11-26
Payer: COMMERCIAL

## 2018-11-29 ENCOUNTER — APPOINTMENT (OUTPATIENT)
Dept: PHYSICAL THERAPY | Facility: REHABILITATION | Age: 68
End: 2018-11-29
Payer: COMMERCIAL

## 2018-12-08 DIAGNOSIS — E11.9 TYPE 2 DIABETES MELLITUS WITHOUT COMPLICATION, WITHOUT LONG-TERM CURRENT USE OF INSULIN (HCC): ICD-10-CM

## 2018-12-18 DIAGNOSIS — E78.00 HYPERCHOLESTEROLEMIA: ICD-10-CM

## 2018-12-18 RX ORDER — ROSUVASTATIN CALCIUM 20 MG/1
TABLET, COATED ORAL
Qty: 90 TABLET | Refills: 1 | Status: SHIPPED | OUTPATIENT
Start: 2018-12-18 | End: 2019-07-07 | Stop reason: SDUPTHER

## 2019-01-31 DIAGNOSIS — N39.41 URGE INCONTINENCE: Primary | ICD-10-CM

## 2019-01-31 RX ORDER — OXYBUTYNIN CHLORIDE 10 MG/1
TABLET, EXTENDED RELEASE ORAL
Qty: 90 TABLET | Refills: 3 | Status: SHIPPED | OUTPATIENT
Start: 2019-01-31 | End: 2020-02-18 | Stop reason: SDUPTHER

## 2019-02-14 ENCOUNTER — CLINICAL SUPPORT (OUTPATIENT)
Dept: INTERNAL MEDICINE CLINIC | Facility: CLINIC | Age: 69
End: 2019-02-14
Payer: COMMERCIAL

## 2019-02-14 DIAGNOSIS — M47.816 SPONDYLOSIS OF LUMBAR REGION WITHOUT MYELOPATHY OR RADICULOPATHY: ICD-10-CM

## 2019-02-14 DIAGNOSIS — E11.41 TYPE 2 DIABETES MELLITUS WITH DIABETIC MONONEUROPATHY, WITHOUT LONG-TERM CURRENT USE OF INSULIN (HCC): ICD-10-CM

## 2019-02-14 DIAGNOSIS — I10 BENIGN ESSENTIAL HYPERTENSION: ICD-10-CM

## 2019-02-14 DIAGNOSIS — Z13.5 SCREENING FOR DIABETIC RETINOPATHY: ICD-10-CM

## 2019-02-14 DIAGNOSIS — I42.0 DILATED CARDIOMYOPATHY (HCC): ICD-10-CM

## 2019-02-14 DIAGNOSIS — E11.41 TYPE 2 DIABETES MELLITUS WITH DIABETIC MONONEUROPATHY, UNSPECIFIED WHETHER LONG TERM INSULIN USE (HCC): Primary | ICD-10-CM

## 2019-02-14 DIAGNOSIS — B35.3 TINEA PEDIS OF RIGHT FOOT: ICD-10-CM

## 2019-02-14 DIAGNOSIS — E78.2 MIXED HYPERLIPIDEMIA: ICD-10-CM

## 2019-02-14 PROCEDURE — 36415 COLL VENOUS BLD VENIPUNCTURE: CPT

## 2019-02-15 LAB
ALBUMIN SERPL-MCNC: 3.8 G/DL (ref 3.6–5.1)
ALBUMIN/CREAT UR: 8 MCG/MG CREAT
ALBUMIN/GLOB SERPL: 1.4 (CALC) (ref 1–2.5)
ALP SERPL-CCNC: 81 U/L (ref 40–115)
ALT SERPL-CCNC: 28 U/L (ref 9–46)
AST SERPL-CCNC: 18 U/L (ref 10–35)
BILIRUB SERPL-MCNC: 0.5 MG/DL (ref 0.2–1.2)
BUN SERPL-MCNC: 21 MG/DL (ref 7–25)
BUN/CREAT SERPL: ABNORMAL (CALC) (ref 6–22)
CALCIUM SERPL-MCNC: 9 MG/DL (ref 8.6–10.3)
CHLORIDE SERPL-SCNC: 106 MMOL/L (ref 98–110)
CO2 SERPL-SCNC: 23 MMOL/L (ref 20–32)
CREAT SERPL-MCNC: 0.81 MG/DL (ref 0.7–1.25)
CREAT UR-MCNC: 113 MG/DL (ref 20–320)
ERYTHROCYTE [DISTWIDTH] IN BLOOD BY AUTOMATED COUNT: 13.9 % (ref 11–15)
EST. AVERAGE GLUCOSE BLD GHB EST-MCNC: 240 (CALC)
EST. AVERAGE GLUCOSE BLD GHB EST-SCNC: 13.3 (CALC)
GLOBULIN SER CALC-MCNC: 2.8 G/DL (CALC) (ref 1.9–3.7)
GLUCOSE SERPL-MCNC: 136 MG/DL (ref 65–99)
HBA1C MFR BLD: 10 % OF TOTAL HGB
HCT VFR BLD AUTO: 40.8 % (ref 38.5–50)
HGB BLD-MCNC: 13.8 G/DL (ref 13.2–17.1)
MCH RBC QN AUTO: 29.8 PG (ref 27–33)
MCHC RBC AUTO-ENTMCNC: 33.8 G/DL (ref 32–36)
MCV RBC AUTO: 88.1 FL (ref 80–100)
MICROALBUMIN UR-MCNC: 0.9 MG/DL
PLATELET # BLD AUTO: 298 THOUSAND/UL (ref 140–400)
PMV BLD REES-ECKER: 9.8 FL (ref 7.5–12.5)
POTASSIUM SERPL-SCNC: 3.9 MMOL/L (ref 3.5–5.3)
PROT SERPL-MCNC: 6.6 G/DL (ref 6.1–8.1)
RBC # BLD AUTO: 4.63 MILLION/UL (ref 4.2–5.8)
SL AMB EGFR AFRICAN AMERICAN: 106 ML/MIN/1.73M2
SL AMB EGFR NON AFRICAN AMERICAN: 91 ML/MIN/1.73M2
SODIUM SERPL-SCNC: 139 MMOL/L (ref 135–146)
WBC # BLD AUTO: 8.4 THOUSAND/UL (ref 3.8–10.8)

## 2019-03-11 DIAGNOSIS — I10 ESSENTIAL HYPERTENSION: ICD-10-CM

## 2019-03-11 PROCEDURE — 4010F ACE/ARB THERAPY RXD/TAKEN: CPT | Performed by: INTERNAL MEDICINE

## 2019-03-11 RX ORDER — CANDESARTAN 32 MG/1
32 TABLET ORAL DAILY
Qty: 90 TABLET | Refills: 1 | Status: SHIPPED | OUTPATIENT
Start: 2019-03-11 | End: 2019-08-29 | Stop reason: SDUPTHER

## 2019-03-14 ENCOUNTER — OFFICE VISIT (OUTPATIENT)
Dept: INTERNAL MEDICINE CLINIC | Facility: CLINIC | Age: 69
End: 2019-03-14
Payer: COMMERCIAL

## 2019-03-14 VITALS
SYSTOLIC BLOOD PRESSURE: 117 MMHG | OXYGEN SATURATION: 96 % | HEIGHT: 64 IN | HEART RATE: 71 BPM | BODY MASS INDEX: 29.53 KG/M2 | WEIGHT: 173 LBS | DIASTOLIC BLOOD PRESSURE: 76 MMHG | TEMPERATURE: 98.5 F

## 2019-03-14 DIAGNOSIS — I42.0 DILATED CARDIOMYOPATHY (HCC): ICD-10-CM

## 2019-03-14 DIAGNOSIS — M47.816 SPONDYLOSIS OF LUMBAR REGION WITHOUT MYELOPATHY OR RADICULOPATHY: ICD-10-CM

## 2019-03-14 DIAGNOSIS — Z11.59 NEED FOR HEPATITIS C SCREENING TEST: Primary | ICD-10-CM

## 2019-03-14 DIAGNOSIS — E11.41 TYPE 2 DIABETES MELLITUS WITH DIABETIC MONONEUROPATHY, WITHOUT LONG-TERM CURRENT USE OF INSULIN (HCC): ICD-10-CM

## 2019-03-14 DIAGNOSIS — M25.512 ACUTE PAIN OF LEFT SHOULDER: ICD-10-CM

## 2019-03-14 DIAGNOSIS — I10 BENIGN ESSENTIAL HYPERTENSION: ICD-10-CM

## 2019-03-14 PROBLEM — R93.1 ABNORMAL ECHOCARDIOGRAM: Status: RESOLVED | Noted: 2017-09-15 | Resolved: 2019-03-14

## 2019-03-14 PROCEDURE — 3078F DIAST BP <80 MM HG: CPT | Performed by: INTERNAL MEDICINE

## 2019-03-14 PROCEDURE — 1160F RVW MEDS BY RX/DR IN RCRD: CPT | Performed by: INTERNAL MEDICINE

## 2019-03-14 PROCEDURE — 3074F SYST BP LT 130 MM HG: CPT | Performed by: INTERNAL MEDICINE

## 2019-03-14 PROCEDURE — 3008F BODY MASS INDEX DOCD: CPT | Performed by: INTERNAL MEDICINE

## 2019-03-14 PROCEDURE — 99214 OFFICE O/P EST MOD 30 MIN: CPT | Performed by: INTERNAL MEDICINE

## 2019-03-14 RX ORDER — IBUPROFEN 600 MG/1
600 TABLET ORAL EVERY 8 HOURS PRN
Qty: 90 TABLET | Refills: 2 | Status: SHIPPED | OUTPATIENT
Start: 2019-03-14 | End: 2019-03-14 | Stop reason: SDUPTHER

## 2019-03-14 RX ORDER — IBUPROFEN 600 MG/1
600 TABLET ORAL EVERY 8 HOURS PRN
Qty: 90 TABLET | Refills: 2 | Status: SHIPPED | OUTPATIENT
Start: 2019-03-14 | End: 2020-02-18 | Stop reason: SDUPTHER

## 2019-03-14 NOTE — ASSESSMENT & PLAN NOTE
Lab Results   Component Value Date    HGBA1C 10 0 (H) 02/14/2019    Will add linagliptin 5 mg daily  Recheck A1c in 3 months  No results for input(s): POCGLU in the last 72 hours      Blood Sugar Average: Last 72 hrs:

## 2019-03-14 NOTE — ASSESSMENT & PLAN NOTE
X-rays requested  Patient can also supplement with the use of ibuprofen  If x-rays are negative we will consider referral to physical therapy

## 2019-03-14 NOTE — PROGRESS NOTES
Assessment/Plan:    No problem-specific Assessment & Plan notes found for this encounter  Diagnoses and all orders for this visit:    Need for hepatitis C screening test  -     Hepatitis C antibody; Future    Spondylosis of lumbar region without myelopathy or radiculopathy  -     ibuprofen (MOTRIN) 600 mg tablet; Take 1 tablet (600 mg total) by mouth every 8 (eight) hours as needed for mild pain for up to 90 days    Type 2 diabetes mellitus with diabetic mononeuropathy, without long-term current use of insulin (HCC)    Benign essential hypertension          Subjective:      Patient ID: Hadley Jett is a 71 y o  male  Patient presents to the office for follow-up visit for type 2 diabetes, hypertension, dilated cardiomyopathy and is complaining of some pain in his left shoulder which had its onset after returning from a vacation to [facility]  While he was traveling he was attending some family functions, 1 of which included a    He noted that his blood sugars had elevated significantly during his trip  When he was checking his sugars they were in general over 300  Upon his return his diet stabilized and he was being very careful and states that his blood sugars have gotten better  He states his morning sugars have ranged between 140 and 175  Hemoglobin A1c was elevated at 10%  His previous hemoglobin A1c was 6 7% in October of last year  He denies any urinary frequency  He denies any chest pain or shortness of breath he has had no issues with palpitations  Since his return from [facility] he has noted some pain in his left shoulder which interferes with his sleep at night  He has had no issues with his blood pressure medications med affect his blood pressure appears to be quite stable  Other than his elevated blood sugar his labs are relatively stable  Urine was unremarkable as far as microalbumin  His CBC was normal as was his metabolic panel with the exception of his blood sugar    His lipids done last fall was significant for some moderately elevated triglyceride levels of 275   Cholesterol was at goal, HDL cholesterol was low at 33 and LDL cholesterol was at goal         Family History   Problem Relation Age of Onset    Heart disease Mother     Hypertension Mother     Ulcers Father     Stomach cancer Father      Social History     Socioeconomic History    Marital status: /Civil Union     Spouse name: Not on file    Number of children: Not on file    Years of education: Not on file    Highest education level: Not on file   Occupational History    Not on file   Social Needs    Financial resource strain: Not on file    Food insecurity:     Worry: Not on file     Inability: Not on file    Transportation needs:     Medical: Not on file     Non-medical: Not on file   Tobacco Use    Smoking status: Never Smoker    Smokeless tobacco: Never Used   Substance and Sexual Activity    Alcohol use: No    Drug use: No    Sexual activity: Not on file   Lifestyle    Physical activity:     Days per week: Not on file     Minutes per session: Not on file    Stress: Not on file   Relationships    Social connections:     Talks on phone: Not on file     Gets together: Not on file     Attends Hinduism service: Not on file     Active member of club or organization: Not on file     Attends meetings of clubs or organizations: Not on file     Relationship status: Not on file    Intimate partner violence:     Fear of current or ex partner: Not on file     Emotionally abused: Not on file     Physically abused: Not on file     Forced sexual activity: Not on file   Other Topics Concern    Not on file   Social History Narrative    Not on file     Past Medical History:   Diagnosis Date    Arthritis     OSTEOARTHRITIS OF LUMBAR SPINE    Carpal tunnel syndrome     Diabetes mellitus ([facility] [address] )     Elevated prostate specific antigen (PSA)     Erectile dysfunction     GERD (gastroesophageal reflux disease)     History of BPH     Hx of adenomatous colonic polyps     Hyperlipidemia     Hypertension        Current Outpatient Medications:     amLODIPine (NORVASC) 10 mg tablet, Take 1 tablet (10 mg total) by mouth daily for 180 days, Disp: 90 tablet, Rfl: 1    candesartan (ATACAND) 32 MG tablet, TAKE 1 TABLET (32 MG TOTAL) BY MOUTH DAILY, Disp: 90 tablet, Rfl: 1    ibuprofen (MOTRIN) 600 mg tablet, Take 1 tablet (600 mg total) by mouth every 8 (eight) hours as needed for mild pain for up to 90 days, Disp: 90 tablet, Rfl: 2    metFORMIN (GLUCOPHAGE) 1000 MG tablet, TAKE 1 TABLET TWICE DAILY  , Disp: 180 tablet, Rfl: 1    omega-3-acid ethyl esters (LOVAZA) 1 g capsule, Take 2 g by mouth, Disp: , Rfl:     omeprazole (PriLOSEC) 40 MG capsule, Take 1 capsule (40 mg total) by mouth daily, Disp: 30 capsule, Rfl: 4    oxybutynin (DITROPAN-XL) 10 MG 24 hr tablet, TAKE 1 TABLET BY MOUTH EVERY DAY, Disp: 90 tablet, Rfl: 3    rosuvastatin (CRESTOR) 20 MG tablet, TAKE 1 TABLET DAILY  , Disp: 90 tablet, Rfl: 1    sildenafil (VIAGRA) 100 mg tablet, Take 1 tablet (100 mg total) by mouth daily as needed for erectile dysfunction, Disp: 3 tablet, Rfl: 5    terbinafine (LamISIL) 1 % cream, Apply topically 2 (two) times a day, Disp: 30 g, Rfl: 0  No Known Allergies  Past Surgical History:   Procedure Laterality Date    APPENDECTOMY      CARPAL TUNNEL RELEASE      COLONOSCOPY      CYSTOSCOPY W/ DILATION OF BLADDER  01/09/2017    DR KILO LENZ, [facility]     EGD AND COLONOSCOPY N/A 2/8/2018    Procedure: EGD with biopsy AND COLONOSCOPY with polypectomy with hemo clip application;  Surgeon: Oscar Andino MD;  Location: [facility]; Service: Gastroenterology    NEUROPLASTY / TRANSPOSITION MEDIAN NERVE AT CARPAL TUNNEL      LEFT & RIGHT         Review of Systems   Constitutional: Negative  HENT: Negative  Eyes: Negative  Respiratory: Negative  Cardiovascular: Negative      Gastrointestinal: Positive for nausea (He did experience some nausea when his blood sugars were elevated)  Genitourinary: Negative  Musculoskeletal: Positive for arthralgias (Left shoulder pain which is worse at night)  Skin: Negative  Neurological: Negative  Hematological: Negative  Psychiatric/Behavioral: Negative  Objective:      /76 (BP Location: Left arm, Patient Position: Sitting, Cuff Size: Standard)   Pulse 71   Temp 98 5 °F (36 9 °C) (Oral)   Ht 5' 3 58" (1 615 m) Comment: with shoes  Wt 78 5 kg (173 lb) Comment: with shoes  SpO2 96%   BMI 30 09 kg/m²          Physical Exam   Constitutional: He is oriented to person, place, and time  He appears well-developed and well-nourished  No distress  HENT:   Head: Normocephalic and atraumatic  Mouth/Throat: Oropharynx is clear and moist    Eyes: Pupils are equal, round, and reactive to light  Conjunctivae are normal  No scleral icterus  Neck: Normal range of motion  Neck supple  No JVD present  No tracheal deviation present  No thyromegaly present  Cardiovascular: Normal rate, regular rhythm and normal heart sounds  No murmur heard  Pulmonary/Chest: Effort normal and breath sounds normal  No respiratory distress  He has no rales  Abdominal: Soft  He exhibits no distension  There is no tenderness  Musculoskeletal: He exhibits no edema, tenderness or deformity  Mild discomfort on abduction of the left shoulder greater than 90°  Mild discomfort on external rotation of the left shoulder  Some bony crepitus was noted   Lymphadenopathy:     He has no cervical adenopathy  Neurological: He is alert and oriented to person, place, and time  No cranial nerve deficit  Coordination normal    No focal motor deficits  Skin: Skin is warm and dry  Capillary refill takes less than 2 seconds  No rash noted  He is not diaphoretic  No erythema  Psychiatric: He has a normal mood and affect  Thought content normal    Vitals reviewed

## 2019-03-15 DIAGNOSIS — Z23 NEED FOR PNEUMOCOCCAL VACCINATION: Primary | ICD-10-CM

## 2019-03-22 DIAGNOSIS — K21.9 CHRONIC GERD: ICD-10-CM

## 2019-03-22 RX ORDER — OMEPRAZOLE 40 MG/1
40 CAPSULE, DELAYED RELEASE ORAL DAILY
Qty: 90 CAPSULE | Refills: 3 | Status: SHIPPED | OUTPATIENT
Start: 2019-03-22 | End: 2020-02-18 | Stop reason: SDUPTHER

## 2019-06-03 ENCOUNTER — HOSPITAL ENCOUNTER (OUTPATIENT)
Dept: RADIOLOGY | Facility: HOSPITAL | Age: 69
Discharge: HOME | End: 2019-06-03
Attending: INTERNAL MEDICINE
Payer: COMMERCIAL

## 2019-06-03 DIAGNOSIS — M25.512 ACUTE PAIN OF LEFT SHOULDER: ICD-10-CM

## 2019-06-03 PROCEDURE — 73030 X-RAY EXAM OF SHOULDER: CPT

## 2019-06-04 ENCOUNTER — CLINICAL SUPPORT (OUTPATIENT)
Dept: INTERNAL MEDICINE CLINIC | Facility: CLINIC | Age: 69
End: 2019-06-04
Payer: COMMERCIAL

## 2019-06-04 DIAGNOSIS — I10 BENIGN ESSENTIAL HYPERTENSION: ICD-10-CM

## 2019-06-04 DIAGNOSIS — Z11.59 NEED FOR HEPATITIS C SCREENING TEST: ICD-10-CM

## 2019-06-04 DIAGNOSIS — E11.41 TYPE 2 DIABETES MELLITUS WITH DIABETIC MONONEUROPATHY, WITHOUT LONG-TERM CURRENT USE OF INSULIN (HCC): Primary | ICD-10-CM

## 2019-06-04 DIAGNOSIS — I42.0 DILATED CARDIOMYOPATHY (HCC): ICD-10-CM

## 2019-06-04 PROCEDURE — 36415 COLL VENOUS BLD VENIPUNCTURE: CPT

## 2019-06-07 LAB
1,5-ANHYDROGLUCITOL SERPL-MCNC: 9.7 MCG/ML (ref 7.3–36.6)
BUN SERPL-MCNC: 27 MG/DL (ref 7–25)
BUN/CREAT SERPL: 28 (CALC) (ref 6–22)
CALCIUM SERPL-MCNC: 9.3 MG/DL (ref 8.6–10.3)
CHLORIDE SERPL-SCNC: 106 MMOL/L (ref 98–110)
CO2 SERPL-SCNC: 21 MMOL/L (ref 20–32)
CREAT SERPL-MCNC: 0.98 MG/DL (ref 0.7–1.25)
EST. AVERAGE GLUCOSE BLD GHB EST-MCNC: 148 (CALC)
EST. AVERAGE GLUCOSE BLD GHB EST-SCNC: 8.2 (CALC)
GLUCOSE SERPL-MCNC: 134 MG/DL (ref 65–99)
HBA1C MFR BLD: 6.8 % OF TOTAL HGB
HCV AB S/CO SERPL IA: 0.1
HCV AB SERPL QL IA: NORMAL
POTASSIUM SERPL-SCNC: 4 MMOL/L (ref 3.5–5.3)
SL AMB EGFR AFRICAN AMERICAN: 91 ML/MIN/1.73M2
SL AMB EGFR NON AFRICAN AMERICAN: 78 ML/MIN/1.73M2
SODIUM SERPL-SCNC: 138 MMOL/L (ref 135–146)

## 2019-06-17 ENCOUNTER — OFFICE VISIT (OUTPATIENT)
Dept: INTERNAL MEDICINE CLINIC | Facility: CLINIC | Age: 69
End: 2019-06-17
Payer: COMMERCIAL

## 2019-06-17 VITALS
WEIGHT: 178.8 LBS | HEIGHT: 64 IN | HEART RATE: 78 BPM | TEMPERATURE: 98.5 F | SYSTOLIC BLOOD PRESSURE: 126 MMHG | BODY MASS INDEX: 30.52 KG/M2 | DIASTOLIC BLOOD PRESSURE: 78 MMHG | OXYGEN SATURATION: 95 %

## 2019-06-17 DIAGNOSIS — N40.1 BPH WITH OBSTRUCTION/LOWER URINARY TRACT SYMPTOMS: ICD-10-CM

## 2019-06-17 DIAGNOSIS — E11.41 TYPE 2 DIABETES MELLITUS WITH DIABETIC MONONEUROPATHY, WITHOUT LONG-TERM CURRENT USE OF INSULIN (HCC): ICD-10-CM

## 2019-06-17 DIAGNOSIS — I10 BENIGN ESSENTIAL HYPERTENSION: ICD-10-CM

## 2019-06-17 DIAGNOSIS — K21.9 CHRONIC GERD: ICD-10-CM

## 2019-06-17 DIAGNOSIS — M19.012 PRIMARY OSTEOARTHRITIS OF LEFT SHOULDER: ICD-10-CM

## 2019-06-17 DIAGNOSIS — Z12.5 ENCOUNTER FOR PROSTATE CANCER SCREENING: ICD-10-CM

## 2019-06-17 DIAGNOSIS — Z23 NEED FOR DIPHTHERIA-TETANUS-PERTUSSIS (TDAP) VACCINE: Primary | ICD-10-CM

## 2019-06-17 DIAGNOSIS — Z00.00 MEDICARE ANNUAL WELLNESS VISIT, INITIAL: ICD-10-CM

## 2019-06-17 DIAGNOSIS — B35.3 TINEA PEDIS OF RIGHT FOOT: ICD-10-CM

## 2019-06-17 DIAGNOSIS — I42.0 DILATED CARDIOMYOPATHY (HCC): ICD-10-CM

## 2019-06-17 DIAGNOSIS — H90.42 SENSORINEURAL HEARING LOSS (SNHL) OF LEFT EAR WITH UNRESTRICTED HEARING OF RIGHT EAR: ICD-10-CM

## 2019-06-17 DIAGNOSIS — N13.8 BPH WITH OBSTRUCTION/LOWER URINARY TRACT SYMPTOMS: ICD-10-CM

## 2019-06-17 PROCEDURE — 1125F AMNT PAIN NOTED PAIN PRSNT: CPT | Performed by: INTERNAL MEDICINE

## 2019-06-17 PROCEDURE — 99214 OFFICE O/P EST MOD 30 MIN: CPT | Performed by: INTERNAL MEDICINE

## 2019-06-17 PROCEDURE — 3008F BODY MASS INDEX DOCD: CPT | Performed by: INTERNAL MEDICINE

## 2019-06-17 PROCEDURE — 3074F SYST BP LT 130 MM HG: CPT | Performed by: INTERNAL MEDICINE

## 2019-06-17 PROCEDURE — G0438 PPPS, INITIAL VISIT: HCPCS | Performed by: INTERNAL MEDICINE

## 2019-06-17 PROCEDURE — 1160F RVW MEDS BY RX/DR IN RCRD: CPT | Performed by: INTERNAL MEDICINE

## 2019-06-17 PROCEDURE — 3078F DIAST BP <80 MM HG: CPT | Performed by: INTERNAL MEDICINE

## 2019-06-17 PROCEDURE — 1170F FXNL STATUS ASSESSED: CPT | Performed by: INTERNAL MEDICINE

## 2019-06-17 RX ORDER — PRENATAL VIT 91/IRON/FOLIC/DHA 28-975-200
COMBINATION PACKAGE (EA) ORAL 2 TIMES DAILY
Qty: 30 G | Refills: 0 | Status: SHIPPED | OUTPATIENT
Start: 2019-06-17 | End: 2020-06-19 | Stop reason: ALTCHOICE

## 2019-07-07 DIAGNOSIS — E78.00 HYPERCHOLESTEROLEMIA: ICD-10-CM

## 2019-07-07 RX ORDER — ROSUVASTATIN CALCIUM 20 MG/1
TABLET, COATED ORAL
Qty: 90 TABLET | Refills: 1 | Status: SHIPPED | OUTPATIENT
Start: 2019-07-07 | End: 2020-01-16

## 2019-08-23 ENCOUNTER — APPOINTMENT (OUTPATIENT)
Dept: LAB | Facility: HOSPITAL | Age: 69
End: 2019-08-23
Payer: COMMERCIAL

## 2019-08-23 DIAGNOSIS — IMO0001 ASYMMETRICAL HEARING LOSS, LEFT: ICD-10-CM

## 2019-08-23 LAB
BUN SERPL-MCNC: 20 MG/DL (ref 5–25)
CREAT SERPL-MCNC: 0.87 MG/DL (ref 0.6–1.3)
GFR SERPL CREATININE-BSD FRML MDRD: 88 ML/MIN/1.73SQ M

## 2019-08-23 PROCEDURE — 84520 ASSAY OF UREA NITROGEN: CPT

## 2019-08-23 PROCEDURE — 82565 ASSAY OF CREATININE: CPT

## 2019-08-23 PROCEDURE — 36415 COLL VENOUS BLD VENIPUNCTURE: CPT

## 2019-08-29 ENCOUNTER — HOSPITAL ENCOUNTER (OUTPATIENT)
Dept: MRI IMAGING | Facility: HOSPITAL | Age: 69
Discharge: HOME | End: 2019-08-29
Payer: COMMERCIAL

## 2019-08-29 DIAGNOSIS — I10 ESSENTIAL HYPERTENSION: ICD-10-CM

## 2019-08-29 DIAGNOSIS — IMO0001 ASYMMETRICAL HEARING LOSS, LEFT: ICD-10-CM

## 2019-08-29 PROCEDURE — 4010F ACE/ARB THERAPY RXD/TAKEN: CPT | Performed by: INTERNAL MEDICINE

## 2019-08-29 PROCEDURE — 70553 MRI BRAIN STEM W/O & W/DYE: CPT

## 2019-08-29 PROCEDURE — A9585 GADOBUTROL INJECTION: HCPCS | Performed by: OTOLARYNGOLOGY

## 2019-08-29 RX ORDER — CANDESARTAN 32 MG/1
32 TABLET ORAL DAILY
Qty: 90 TABLET | Refills: 1 | Status: SHIPPED | OUTPATIENT
Start: 2019-08-29 | End: 2020-02-06 | Stop reason: SDUPTHER

## 2019-08-29 RX ADMIN — GADOBUTROL 7 ML: 604.72 INJECTION INTRAVENOUS at 11:22

## 2019-09-07 DIAGNOSIS — Z13.5 SCREENING FOR DIABETIC RETINOPATHY: ICD-10-CM

## 2019-09-07 DIAGNOSIS — I10 BENIGN ESSENTIAL HYPERTENSION: ICD-10-CM

## 2019-09-07 DIAGNOSIS — E11.41 TYPE 2 DIABETES MELLITUS WITH DIABETIC MONONEUROPATHY, WITHOUT LONG-TERM CURRENT USE OF INSULIN (HCC): ICD-10-CM

## 2019-09-07 DIAGNOSIS — M47.816 SPONDYLOSIS OF LUMBAR REGION WITHOUT MYELOPATHY OR RADICULOPATHY: ICD-10-CM

## 2019-09-07 DIAGNOSIS — I42.0 DILATED CARDIOMYOPATHY (HCC): ICD-10-CM

## 2019-09-07 RX ORDER — AMLODIPINE BESYLATE 10 MG/1
TABLET ORAL
Qty: 90 TABLET | Refills: 1 | Status: SHIPPED | OUTPATIENT
Start: 2019-09-07 | End: 2020-02-06 | Stop reason: SDUPTHER

## 2019-09-09 ENCOUNTER — OFFICE VISIT (OUTPATIENT)
Dept: OBGYN CLINIC | Facility: HOSPITAL | Age: 69
End: 2019-09-09
Payer: COMMERCIAL

## 2019-09-09 VITALS
WEIGHT: 178 LBS | SYSTOLIC BLOOD PRESSURE: 128 MMHG | HEART RATE: 77 BPM | HEIGHT: 64 IN | DIASTOLIC BLOOD PRESSURE: 81 MMHG | BODY MASS INDEX: 30.39 KG/M2

## 2019-09-09 DIAGNOSIS — M75.82 ROTATOR CUFF TENDONITIS, LEFT: ICD-10-CM

## 2019-09-09 DIAGNOSIS — M75.52 SUBACROMIAL BURSITIS OF LEFT SHOULDER JOINT: Primary | ICD-10-CM

## 2019-09-09 PROCEDURE — 99203 OFFICE O/P NEW LOW 30 MIN: CPT | Performed by: ORTHOPAEDIC SURGERY

## 2019-09-09 PROCEDURE — 20610 DRAIN/INJ JOINT/BURSA W/O US: CPT | Performed by: ORTHOPAEDIC SURGERY

## 2019-09-09 RX ORDER — BUPIVACAINE HYDROCHLORIDE 2.5 MG/ML
2 INJECTION, SOLUTION INFILTRATION; PERINEURAL
Status: COMPLETED | OUTPATIENT
Start: 2019-09-09 | End: 2019-09-09

## 2019-09-09 RX ORDER — BETAMETHASONE SODIUM PHOSPHATE AND BETAMETHASONE ACETATE 3; 3 MG/ML; MG/ML
6 INJECTION, SUSPENSION INTRA-ARTICULAR; INTRALESIONAL; INTRAMUSCULAR; SOFT TISSUE
Status: COMPLETED | OUTPATIENT
Start: 2019-09-09 | End: 2019-09-09

## 2019-09-09 RX ADMIN — BETAMETHASONE SODIUM PHOSPHATE AND BETAMETHASONE ACETATE 6 MG: 3; 3 INJECTION, SUSPENSION INTRA-ARTICULAR; INTRALESIONAL; INTRAMUSCULAR; SOFT TISSUE at 15:49

## 2019-09-09 RX ADMIN — BUPIVACAINE HYDROCHLORIDE 2 ML: 2.5 INJECTION, SOLUTION INFILTRATION; PERINEURAL at 15:49

## 2019-09-09 NOTE — PATIENT INSTRUCTIONS

## 2019-09-09 NOTE — PROGRESS NOTES
Assessment  Diagnoses and all orders for this visit:    Subacromial bursitis of left shoulder joint    Rotator cuff tendonitis, left      Discussion and Plan:  The patient has an examination consistent with subacromial impingement syndrome of the left shoulder  I have discussed with the patient the pathophysiology of this diagnosis and reviewed how the examination correlates with this diagnosis  Treatment options were discussed at length and after discussing these treatment options, the patient elected for and received a subacromial injection of corticosteroid (as described in the procedure note) with a prescription for referral to physical therapy  We will reevaluate the patient in 6-8 weeks  If the symptoms fail to improve with this treatment the patient would be indicated for further imaging in the form of an MRI scan of the shoulder  Subjective:   Patient ID: Juan Pablo Rivera is a 71 y o  male      HPI  The patient presents with a chief complaint of left shoulder pain  The pain began several month(s) ago and is not associated with an acute injury  The patient describes the pain as aching, dull and sharp in intensity,  intermittent in timing, and localizes the pain to the  left lateral shoulder  The pain is worse with overhead work and relieved by rest   The pain is not associated with numbness and tingling  The pain is not associated with constitutional symptoms  The patient is awoken at night by the pain  The patient has no had any treatment  The following portions of the patient's history were reviewed and updated as appropriate: allergies, current medications, past family history, past medical history, past social history, past surgical history and problem list     Review of Systems   Constitutional: Negative for chills and fever  HENT: Negative for drooling and hearing loss  Eyes: Negative for visual disturbance  Respiratory: Negative for cough and shortness of breath  Cardiovascular: Negative for chest pain  Gastrointestinal: Negative for abdominal pain  Skin: Negative for rash  Psychiatric/Behavioral: Negative for agitation  Objective:  Left Shoulder Exam     Tenderness   Left shoulder tenderness location: subacromial bursa  Range of Motion   External rotation: 60   Forward flexion: 170   Internal rotation 0 degrees: Lumbar     Muscle Strength   Abduction: 4/5   External rotation: 5/5     Tests   Callahan test: positive  Impingement: positive  Drop arm: negative    Other   Erythema: absent  Sensation: normal  Pulse: present             Physical Exam   Constitutional: He appears well-developed and well-nourished  HENT:   Head: Normocephalic  Eyes: Pupils are equal, round, and reactive to light  Pulmonary/Chest: Effort normal    Skin: Skin is warm and dry  Psychiatric: He has a normal mood and affect  Vitals reviewed  Large joint arthrocentesis: L subacromial bursa  Date/Time: 9/9/2019 3:49 PM  Consent given by: parent  Site marked: site marked  Timeout: Immediately prior to procedure a time out was called to verify the correct patient, procedure, equipment, support staff and site/side marked as required   Supporting Documentation  Indications: pain   Procedure Details  Location: shoulder - L subacromial bursa  Preparation: Patient was prepped and draped in the usual sterile fashion  Needle size: 22 G  Ultrasound guidance: no  Approach: lateral  Medications administered: 2 mL bupivacaine 0 25 %; 6 mg betamethasone acetate-betamethasone sodium phosphate 6 (3-3) mg/mL    Patient tolerance: patient tolerated the procedure well with no immediate complications  Dressing:  Sterile dressing applied          I have personally reviewed pertinent films in PACS and my interpretation is as follows    Left shoulder x-rays no fracture or dislocation, mild GH jt arthritis    Scribe Attestation    I,:   Amairani Ponce am acting as a scribe while in the presence of the attending physician :        I,:   Hugh Ramirez MD personally performed the services described in this documentation    as scribed in my presence :

## 2019-09-10 DIAGNOSIS — E11.41 TYPE 2 DIABETES MELLITUS WITH DIABETIC MONONEUROPATHY, WITHOUT LONG-TERM CURRENT USE OF INSULIN (HCC): ICD-10-CM

## 2019-09-10 RX ORDER — LINAGLIPTIN 5 MG/1
TABLET, FILM COATED ORAL
Qty: 30 TABLET | Refills: 5 | OUTPATIENT
Start: 2019-09-10

## 2019-09-17 DIAGNOSIS — E11.41 TYPE 2 DIABETES MELLITUS WITH DIABETIC MONONEUROPATHY, WITHOUT LONG-TERM CURRENT USE OF INSULIN (HCC): ICD-10-CM

## 2019-10-11 ENCOUNTER — APPOINTMENT (OUTPATIENT)
Dept: LAB | Facility: HOSPITAL | Age: 69
End: 2019-10-11
Attending: INTERNAL MEDICINE
Payer: COMMERCIAL

## 2019-10-11 DIAGNOSIS — E11.41 TYPE 2 DIABETES MELLITUS WITH DIABETIC MONONEUROPATHY, WITHOUT LONG-TERM CURRENT USE OF INSULIN (HCC): ICD-10-CM

## 2019-10-11 DIAGNOSIS — N40.1 BPH WITH OBSTRUCTION/LOWER URINARY TRACT SYMPTOMS: ICD-10-CM

## 2019-10-11 DIAGNOSIS — Z12.5 ENCOUNTER FOR PROSTATE CANCER SCREENING: ICD-10-CM

## 2019-10-11 DIAGNOSIS — N13.8 BPH WITH OBSTRUCTION/LOWER URINARY TRACT SYMPTOMS: ICD-10-CM

## 2019-10-11 LAB
ALBUMIN SERPL BCP-MCNC: 3.7 G/DL (ref 3.5–5)
ALP SERPL-CCNC: 69 U/L (ref 46–116)
ALT SERPL W P-5'-P-CCNC: 17 U/L (ref 12–78)
ANION GAP SERPL CALCULATED.3IONS-SCNC: 8 MMOL/L (ref 4–13)
AST SERPL W P-5'-P-CCNC: 16 U/L (ref 5–45)
BILIRUB SERPL-MCNC: 0.43 MG/DL (ref 0.2–1)
BUN SERPL-MCNC: 20 MG/DL (ref 5–25)
CALCIUM SERPL-MCNC: 9.1 MG/DL (ref 8.3–10.1)
CHLORIDE SERPL-SCNC: 106 MMOL/L (ref 100–108)
CO2 SERPL-SCNC: 26 MMOL/L (ref 21–32)
CREAT SERPL-MCNC: 0.9 MG/DL (ref 0.6–1.3)
CREAT UR-MCNC: 126 MG/DL
ERYTHROCYTE [DISTWIDTH] IN BLOOD BY AUTOMATED COUNT: 13.1 % (ref 11.6–15.1)
EST. AVERAGE GLUCOSE BLD GHB EST-MCNC: 166 MG/DL
GFR SERPL CREATININE-BSD FRML MDRD: 87 ML/MIN/1.73SQ M
GLUCOSE P FAST SERPL-MCNC: 142 MG/DL (ref 65–99)
HBA1C MFR BLD: 7.4 % (ref 4.2–6.3)
HCT VFR BLD AUTO: 44 % (ref 36.5–49.3)
HGB BLD-MCNC: 14.5 G/DL (ref 12–17)
MCH RBC QN AUTO: 29.7 PG (ref 26.8–34.3)
MCHC RBC AUTO-ENTMCNC: 33 G/DL (ref 31.4–37.4)
MCV RBC AUTO: 90 FL (ref 82–98)
MICROALBUMIN UR-MCNC: 10.7 MG/L (ref 0–20)
MICROALBUMIN/CREAT 24H UR: 8 MG/G CREATININE (ref 0–30)
PLATELET # BLD AUTO: 210 THOUSANDS/UL (ref 149–390)
PMV BLD AUTO: 9.3 FL (ref 8.9–12.7)
POTASSIUM SERPL-SCNC: 4 MMOL/L (ref 3.5–5.3)
PROT SERPL-MCNC: 7.3 G/DL (ref 6.4–8.2)
PSA SERPL-MCNC: 2.2 NG/ML (ref 0–4)
RBC # BLD AUTO: 4.89 MILLION/UL (ref 3.88–5.62)
SODIUM SERPL-SCNC: 140 MMOL/L (ref 136–145)
WBC # BLD AUTO: 7.83 THOUSAND/UL (ref 4.31–10.16)

## 2019-10-11 PROCEDURE — 85027 COMPLETE CBC AUTOMATED: CPT

## 2019-10-11 PROCEDURE — 82570 ASSAY OF URINE CREATININE: CPT

## 2019-10-11 PROCEDURE — 84153 ASSAY OF PSA TOTAL: CPT

## 2019-10-11 PROCEDURE — 80053 COMPREHEN METABOLIC PANEL: CPT

## 2019-10-11 PROCEDURE — 83036 HEMOGLOBIN GLYCOSYLATED A1C: CPT

## 2019-10-11 PROCEDURE — 36415 COLL VENOUS BLD VENIPUNCTURE: CPT

## 2019-10-11 PROCEDURE — 82043 UR ALBUMIN QUANTITATIVE: CPT

## 2019-10-21 ENCOUNTER — OFFICE VISIT (OUTPATIENT)
Dept: INTERNAL MEDICINE CLINIC | Facility: CLINIC | Age: 69
End: 2019-10-21
Payer: COMMERCIAL

## 2019-10-21 VITALS
DIASTOLIC BLOOD PRESSURE: 68 MMHG | OXYGEN SATURATION: 97 % | WEIGHT: 178.2 LBS | HEIGHT: 63 IN | BODY MASS INDEX: 31.57 KG/M2 | HEART RATE: 68 BPM | TEMPERATURE: 98.4 F | SYSTOLIC BLOOD PRESSURE: 124 MMHG

## 2019-10-21 DIAGNOSIS — Z23 NEED FOR INFLUENZA VACCINATION: Primary | ICD-10-CM

## 2019-10-21 DIAGNOSIS — E11.41 TYPE 2 DIABETES MELLITUS WITH DIABETIC MONONEUROPATHY, WITHOUT LONG-TERM CURRENT USE OF INSULIN (HCC): ICD-10-CM

## 2019-10-21 DIAGNOSIS — I10 BENIGN ESSENTIAL HYPERTENSION: ICD-10-CM

## 2019-10-21 DIAGNOSIS — I42.0 DILATED CARDIOMYOPATHY (HCC): ICD-10-CM

## 2019-10-21 DIAGNOSIS — D43.2 SUBEPENDYMOMA (HCC): ICD-10-CM

## 2019-10-21 PROCEDURE — 90662 IIV NO PRSV INCREASED AG IM: CPT

## 2019-10-21 PROCEDURE — 99214 OFFICE O/P EST MOD 30 MIN: CPT | Performed by: INTERNAL MEDICINE

## 2019-10-21 PROCEDURE — 3074F SYST BP LT 130 MM HG: CPT | Performed by: INTERNAL MEDICINE

## 2019-10-21 PROCEDURE — G0008 ADMIN INFLUENZA VIRUS VAC: HCPCS

## 2019-10-21 PROCEDURE — 3078F DIAST BP <80 MM HG: CPT | Performed by: INTERNAL MEDICINE

## 2019-10-21 NOTE — PROGRESS NOTES
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[facility] )  We will refer the patient to neuro surgery  Follow-up MRI in April of 2020       Diagnoses and all orders for this visit:    Need for influenza vaccination  -     influenza vaccine, [id number], high-dose, PF 0 5 mL (FLUZONE HIGH-DOSE)    Type 2 diabetes mellitus with diabetic mononeuropathy, without long-term current use of insulin ([facility] )  -     Ambulatory referral to Nutrition Services; Future  -     CBC and Platelet; Future  -     Comprehensive metabolic panel; Future  -     HEMOGLOBIN A1C W/ EAG ESTIMATION; Future  -     Microalbumin / creatinine urine ratio; Future    Benign essential hypertension  -     Ambulatory referral to Nutrition Services; Future    Dilated cardiomyopathy ([facility] )  -     Ambulatory referral to Nutrition Services; Future    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[facility])  -     Ambulatory referral to Neurosurgery; Future          Subjective:      Patient ID: Jenny Bautista is a 71 y o  male  Patient presents to the office for follow-up visit for type 2 diabetes, hypertension, history of dilated cardiomyopathy and the 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 for hearing loss  The patient was to be referred to neurosurgery but this has not been accomplished yet  He denies any headaches, nausea or vomiting  He denies any lightheadedness or dizziness  He denies any unsteadiness of his gait  He had lab work drawn prior to the office visit which was reviewed  His hemoglobin A1c is somewhat higher than previously at 7 4%  The patient admits to being dietary noncompliant  He did have some episodes over the summer of some severe suprapubic discomfort which was relieved upon passage of small stones    He did not experience any flank discomfort during this episode and denies any hematuria  His CMP was significant only for an elevated fasting glucose of 142  The remainder of his exam was normal   Renal function was normal hepatic function studies were also within normal limits  Urinalysis was negative for any significant micro albuminuria    CBC was within normal limits      Family History   Problem Relation Age of Onset    Heart disease Mother     Hypertension Mother     Ulcers Father     Stomach cancer Father      Social History     Socioeconomic History    Marital status: /Civil Union     Spouse name: Not on file    Number of children: Not on file    Years of education: Not on file    Highest education level: Not on file   Occupational History    Not on file   Social Needs    Financial resource strain: Not on file    Food insecurity:     Worry: Not on file     Inability: Not on file    Transportation needs:     Medical: Not on file     Non-medical: Not on file   Tobacco Use    Smoking status: Never Smoker    Smokeless tobacco: Never Used   Substance and Sexual Activity    Alcohol use: No    Drug use: No    Sexual activity: Not on file   Lifestyle    Physical activity:     Days per week: Not on file     Minutes per session: Not on file    Stress: Not on file   Relationships    Social connections:     Talks on phone: Not on file     Gets together: Not on file     Attends Judaism service: Not on file     Active member of club or organization: Not on file     Attends meetings of clubs or organizations: Not on file     Relationship status: Not on file    Intimate partner violence:     Fear of current or ex partner: Not on file     Emotionally abused: Not on file     Physically abused: Not on file     Forced sexual activity: Not on file   Other Topics Concern    Not on file   Social History Narrative    Not on file     Past Medical History:   Diagnosis Date    Abnormal echocardiogram 9/15/2017    Arthritis     OSTEOARTHRITIS OF LUMBAR SPINE    Carpal tunnel syndrome     Diabetes mellitus (HCC)     Elevated prostate specific antigen (PSA)     Erectile dysfunction     GERD (gastroesophageal reflux disease)     History of BPH     Hx of adenomatous colonic polyps     Hyperlipidemia     Hypertension     Hypertension        Current Outpatient Medications:     amLODIPine (NORVASC) 10 mg tablet, TAKE 1 TABLET (10 MG TOTAL) BY MOUTH DAILY, Disp: 90 tablet, Rfl: 1    candesartan (ATACAND) 32 MG tablet, TAKE 1 TABLET (32 MG TOTAL) BY MOUTH DAILY, Disp: 90 tablet, Rfl: 1    ibuprofen (MOTRIN) 600 mg tablet, Take 1 tablet (600 mg total) by mouth every 8 (eight) hours as needed for mild pain for up to 90 days, Disp: 90 tablet, Rfl: 2    linaGLIPtin 5 MG TABS, Take 5 mg by mouth daily, Disp: 30 tablet, Rfl: 5    metFORMIN (GLUCOPHAGE) 1000 MG tablet, TAKE 1 TABLET TWICE DAILY  , Disp: 180 tablet, Rfl: 1    omega-3-acid ethyl esters (LOVAZA) 1 g capsule, Take 2 g by mouth, Disp: , Rfl:     omeprazole (PriLOSEC) 40 MG capsule, Take 1 capsule (40 mg total) by mouth daily, Disp: 90 capsule, Rfl: 3    oxybutynin (DITROPAN-XL) 10 MG 24 hr tablet, TAKE 1 TABLET BY MOUTH EVERY DAY, Disp: 90 tablet, Rfl: 3    rosuvastatin (CRESTOR) 20 MG tablet, TAKE 1 TABLET DAILY  , Disp: 90 tablet, Rfl: 1    sildenafil (VIAGRA) 100 mg tablet, Take 1 tablet (100 mg total) by mouth daily as needed for erectile dysfunction, Disp: 3 tablet, Rfl: 5    terbinafine (LamISIL) 1 % cream, Apply topically 2 (two) times a day, Disp: 30 g, Rfl: 0  No Known Allergies  Past Surgical History:   Procedure Laterality Date    APPENDECTOMY      CARPAL TUNNEL RELEASE      COLONOSCOPY      CYSTOSCOPY W/ DILATION OF BLADDER  01/09/2017    DR KILO LENZ, [facility]     EGD AND COLONOSCOPY N/A 2/8/2018    Procedure: EGD with biopsy AND COLONOSCOPY with polypectomy with hemo clip application;  Surgeon: Chastity Clemente MD;  Location: [facility];   Service: Gastroenterology   [facility] NEUROPLASTY / TRANSPOSITION MEDIAN NERVE AT CARPAL TUNNEL      LEFT & RIGHT         Review of Systems   Constitutional: Negative  HENT: Negative  Respiratory: Negative  Cardiovascular: Negative  Gastrointestinal: Negative  Genitourinary: Positive for dysuria (Suprapubic discomfort with passage of small kidney stones)  Negative for flank pain  Musculoskeletal: Negative  Neurological: Negative  Hematological: Negative  Psychiatric/Behavioral: Negative  Objective:      /68 (BP Location: Left arm, Patient Position: Sitting, Cuff Size: Standard)   Pulse 68   Temp 98 4 °F (36 9 °C) (Oral)   Ht 5' 2 84" (1 596 m)   Wt 80 8 kg (178 lb 3 2 oz)   SpO2 97%   BMI 31 73 kg/m²          Physical Exam   Constitutional: He is oriented to person, place, and time  He appears well-developed and well-nourished  No distress  HENT:   Head: Normocephalic and atraumatic  Right Ear: External ear normal    Left Ear: External ear normal    Eyes: Pupils are equal, round, and reactive to light  Conjunctivae are normal  Right eye exhibits no discharge  Left eye exhibits no discharge  No scleral icterus  Neck: Neck supple  No JVD present  No tracheal deviation present  No thyromegaly present  Cardiovascular: Normal rate, regular rhythm and normal heart sounds  Pulmonary/Chest: Effort normal and breath sounds normal  No respiratory distress  Abdominal: Soft  He exhibits no distension  Musculoskeletal: He exhibits no edema or deformity  Neurological: He is alert and oriented to person, place, and time  No cranial nerve deficit  Coordination normal    Grossly, no focal motor deficits are appreciated  Skin: Skin is warm and dry  He is not diaphoretic  No erythema  Psychiatric: He has a normal mood and affect  His behavior is normal    Vitals reviewed

## 2019-11-25 ENCOUNTER — CONSULT (OUTPATIENT)
Dept: NEUROSURGERY | Facility: CLINIC | Age: 69
End: 2019-11-25

## 2019-11-25 VITALS
BODY MASS INDEX: 31.54 KG/M2 | DIASTOLIC BLOOD PRESSURE: 70 MMHG | TEMPERATURE: 99 F | WEIGHT: 178 LBS | HEART RATE: 90 BPM | SYSTOLIC BLOOD PRESSURE: 140 MMHG | HEIGHT: 63 IN

## 2019-11-25 DIAGNOSIS — D43.2 SUBEPENDYMOMA (HCC): ICD-10-CM

## 2019-11-25 DIAGNOSIS — G93.9 BRAIN LESION: Primary | ICD-10-CM

## 2019-11-25 PROCEDURE — 99204 OFFICE O/P NEW MOD 45 MIN: CPT | Performed by: PHYSICIAN ASSISTANT

## 2019-11-25 NOTE — PATIENT INSTRUCTIONS
Patient is to follow up in 3 months with new MRI brain with and without contrast for continued monitoring of 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, obtain BUN and creatinine prior to study  He is instructed to return to the office or the ED immediately if he develops balance issues, headaches, nausea or any other red flag signs

## 2019-11-25 NOTE — PROGRESS NOTES
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[facility])  Patient seen as a new consult for a second opinion after MRI brain IAC showed possible subependymoma  Imaging reviewed personally and with attending, results are as follows:   MRI brain IAC w wo contrast 8/2019:  Lobulated mass descends between the dorsal mandibular and cerebellum from the caudal 4th ventricle  This is 1 4 x 1 3 x 1 1 cm in CC, AP and transverse dimension respectively  Very minimal enhancement of the inferior aspect of this mass, which is thought to represent subependymoma  Plan:   Recommend patient return to the office in 3 months with repeat MRI brain w wo contrast for continued monitoring - order placed   He is to obtain BUN and creatinine prior to obtaining imaging - order placed   Patient is informed that should be experience any red flag signs such as headaches, dizziness, gait instability, etc , he is to go to the ED for evaluation   At this time, imaging does not show signs of hydrocephalus but patient is informed that there is a concern that should this mass increase in size it can lead to headaches, nausea, lethargy, and death if not treated immediately   He is educated on the nature of subependymomas, usually benign slow growing tumors arising from glial cells, however given location of mass, close interval follow up is recommended    Patient expresses understanding and agrees with the plan as discussed with attending present       Diagnoses and all orders for this visit:    Brain lesion  -     BUN; Future  -     Creatinine, serum;  Future  -     MRI brain with and without contrast; Future    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[facility] )            CHIEF COMPLAINT    Chief Complaint   Patient presents with    Consult     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HISTORY    History of Present Illness     71y o  year old male with PMHx of HTN and DMII who presents for a second opinion at the request of his PCP due to MRI findings of subependymoma  Patient states he has been following up with ENT regarding left hearing loss x 1 year  He was instructed to get a hearing aid but has yet to get one  He underwent an MRI brain IAC for evaluation of possible acoustic neuroma as the cause of his hearing loss and was found to have a 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  At this time, he complains of some occasional nausea, chronic pain and numbness / tingling in left arm due to arthritis, left leg numbness due to lumbar disc disease, and a popping sensation in his ears  He reports a hx of brain cancer in his niece who is   Hx of bilateral carpal tunnel surgery two years ago  HPI    See Discussion    REVIEW OF SYSTEMS    Review of Systems   Constitutional: Negative for chills and fever  HENT: Positive for hearing loss (Left ear )  Negative for trouble swallowing  Eyes: Negative for visual disturbance  Respiratory: Negative for chest tightness and shortness of breath  Cardiovascular: Negative for chest pain  Gastrointestinal: Negative for abdominal pain, constipation, diarrhea, nausea and vomiting  Genitourinary: Negative for difficulty urinating  Musculoskeletal: Negative for back pain and neck pain  Skin: Negative for wound  Allergic/Immunologic: Negative for environmental allergies and food allergies  Neurological: Negative for dizziness, facial asymmetry, speech difficulty, weakness, numbness and headaches  Hematological: Does not bruise/bleed easily  Psychiatric/Behavioral: Negative for confusion  All other systems reviewed and are negative          Meds/Allergies     Current Outpatient Medications   Medication Sig Dispense Refill    amLODIPine (NORVASC) 10 mg tablet TAKE 1 TABLET (10 MG TOTAL) BY MOUTH DAILY 90 tablet 1    candesartan (ATACAND) 32 MG tablet TAKE 1 TABLET (32 MG TOTAL) BY MOUTH DAILY 90 tablet 1    ibuprofen (MOTRIN) 600 mg tablet Take 1 tablet (600 mg total) by mouth every 8 (eight) hours as needed for mild pain for up to 90 days 90 tablet 2    linaGLIPtin 5 MG TABS Take 5 mg by mouth daily 30 tablet 5    metFORMIN (GLUCOPHAGE) 1000 MG tablet TAKE 1 TABLET TWICE DAILY  180 tablet 1    omega-3-acid ethyl esters (LOVAZA) 1 g capsule Take 2 g by mouth      omeprazole (PriLOSEC) 40 MG capsule Take 1 capsule (40 mg total) by mouth daily 90 capsule 3    oxybutynin (DITROPAN-XL) 10 MG 24 hr tablet TAKE 1 TABLET BY MOUTH EVERY DAY 90 tablet 3    rosuvastatin (CRESTOR) 20 MG tablet TAKE 1 TABLET DAILY  90 tablet 1    sildenafil (VIAGRA) 100 mg tablet Take 1 tablet (100 mg total) by mouth daily as needed for erectile dysfunction 3 tablet 5    terbinafine (LamISIL) 1 % cream Apply topically 2 (two) times a day 30 g 0     No current facility-administered medications for this visit  No Known Allergies    PAST HISTORY    Past Medical History:   Diagnosis Date    Abnormal echocardiogram 9/15/2017    Arthritis     OSTEOARTHRITIS OF LUMBAR SPINE    Carpal tunnel syndrome     Diabetes mellitus (HCC)     Elevated prostate specific antigen (PSA)     Erectile dysfunction     GERD (gastroesophageal reflux disease)     History of BPH     Hx of adenomatous colonic polyps     Hyperlipidemia     Hypertension     Hypertension        Past Surgical History:   Procedure Laterality Date    APPENDECTOMY      CARPAL TUNNEL RELEASE      COLONOSCOPY      CYSTOSCOPY W/ DILATION OF BLADDER  01/09/2017    DR KILO LENZ, [facility]     EGD AND COLONOSCOPY N/A 2/8/2018    Procedure: EGD with biopsy AND COLONOSCOPY with polypectomy with hemo clip application;  Surgeon: Rocío Holly MD;  Location: [facility];   Service: Gastroenterology    NEUROPLASTY / TRANSPOSITION MEDIAN NERVE AT CARPAL TUNNEL      LEFT & RIGHT       Social History     Tobacco Use    Smoking status: Never Smoker    Smokeless tobacco: Never Used   Substance Use Topics    Alcohol use: No    Drug use: No       Family History   Problem Relation Age of Onset    Heart disease Mother     Hypertension Mother     Ulcers Father     Stomach cancer Father          Above history personally reviewed  EXAM    Vitals:Blood pressure 140/70, pulse 90, temperature 99 °F (37 2 °C), temperature source Temporal, height 5' 2 84" (1 596 m), weight 80 7 kg (178 lb)  ,Body mass index is 31 69 kg/m²  Physical Exam   Constitutional: He is oriented to person, place, and time  He appears well-developed and well-nourished  He is cooperative  HENT:   Head: Normocephalic and atraumatic  Eyes: Pupils are equal, round, and reactive to light  Conjunctivae and EOM are normal    Cardiovascular: Normal rate  Pulmonary/Chest: Effort normal  No respiratory distress  Musculoskeletal: Normal range of motion  Cervical back: He exhibits no tenderness  Thoracic back: He exhibits no tenderness  Lumbar back: He exhibits no tenderness  Neurological: He is alert and oriented to person, place, and time  He has normal strength  He has a normal Finger-Nose-Finger Test    Reflex Scores:       Bicep reflexes are 2+ on the right side and 2+ on the left side  Brachioradialis reflexes are 2+ on the right side and 2+ on the left side  Patellar reflexes are 2+ on the right side and 2+ on the left side  Achilles reflexes are 2+ on the right side and 2+ on the left side  Skin: Skin is warm, dry and intact  Psychiatric: He has a normal mood and affect  His speech is normal and behavior is normal  Judgment and thought content normal  Cognition and memory are normal        Neurologic Exam     Mental Status   Oriented to person, place, and time  Follows 1 step commands  Attention: normal  Concentration: normal    Speech: speech is normal   Level of consciousness: alert  Knowledge: good  Able to perform simple calculations  Able to name object  Normal comprehension       Cranial Nerves CN II   Right visual field deficit: none  Left visual field deficit: none     CN III, IV, VI   Pupils are equal, round, and reactive to light  Extraocular motions are normal    CN III: no CN III palsy  CN VI: no CN VI palsy  Nystagmus: none   Diplopia: none  Ophthalmoparesis: none  Upgaze: normal  Downgaze: normal  Conjugate gaze: present    CN V   Right facial sensation deficit: none  Left facial sensation deficit: none    CN VII   Right facial weakness: none  Left facial weakness: none    CN VIII   Hearing: intact    CN IX, X   CN IX normal    CN X normal      CN XI   Right trapezius strength: normal  Left trapezius strength: normal    CN XII   CN XII normal      Motor Exam   Muscle bulk: normal  Overall muscle tone: normal  Right arm pronator drift: absent  Left arm pronator drift: absent    Strength   Strength 5/5 throughout  Sensory Exam   Light touch normal    DST intact     Gait, Coordination, and Reflexes     Coordination   Finger to nose coordination: normal    Tremor   Resting tremor: absent  Intention tremor: absent  Action tremor: absent    Reflexes   Right brachioradialis: 2+  Left brachioradialis: 2+  Right biceps: 2+  Left biceps: 2+  Right patellar: 2+  Left patellar: 2+  Right achilles: 2+  Left achilles: 2+  Right : 2+  Left : 2+  Right [place]: absent  Left [place]: absent  Right ankle clonus: absent  Left ankle clonus: absent        MEDICAL DECISION MAKING    Imaging Studies:     I have personally reviewed pertinent reports     and I have personally reviewed pertinent films in PACS

## 2019-11-25 NOTE — ASSESSMENT & PLAN NOTE
Patient seen as a new consult for a second opinion after MRI brain IAC showed possible subependymoma  Imaging reviewed personally and with attending, results are as follows:   MRI brain IAC w wo contrast 8/2019:  Lobulated mass descends between the dorsal mandibular and cerebellum from the caudal 4th ventricle  This is 1 4 x 1 3 x 1 1 cm in CC, AP and transverse dimension respectively  Very minimal enhancement of the inferior aspect of this mass, which is thought to represent subependymoma        Plan:   Recommend patient return to the office in 3 months with repeat MRI brain w wo contrast for continued monitoring - order placed   He is to obtain BUN and creatinine prior to obtaining imaging - order placed   Patient is informed that should be experience any red flag signs such as headaches, dizziness, gait instability, etc , he is to go to the ED for evaluation   At this time, imaging does not show signs of hydrocephalus but patient is informed that there is a concern that should this mass increase in size it can lead to headaches, nausea, lethargy, and death if not treated immediately   He is educated on the nature of subependymomas, usually benign slow growing tumors arising from glial cells, however given location of mass, close interval follow up is recommended    Patient expresses understanding and agrees with the plan as discussed with attending present

## 2019-12-09 ENCOUNTER — OFFICE VISIT (OUTPATIENT)
Dept: INTERNAL MEDICINE CLINIC | Facility: CLINIC | Age: 69
End: 2019-12-09
Payer: COMMERCIAL

## 2019-12-09 VITALS
OXYGEN SATURATION: 97 % | BODY MASS INDEX: 30.56 KG/M2 | DIASTOLIC BLOOD PRESSURE: 76 MMHG | HEIGHT: 64 IN | SYSTOLIC BLOOD PRESSURE: 124 MMHG | HEART RATE: 66 BPM | TEMPERATURE: 98.1 F | WEIGHT: 179 LBS

## 2019-12-09 DIAGNOSIS — E11.41 TYPE 2 DIABETES MELLITUS WITH DIABETIC MONONEUROPATHY, WITHOUT LONG-TERM CURRENT USE OF INSULIN (HCC): ICD-10-CM

## 2019-12-09 DIAGNOSIS — R30.0 DYSURIA: Primary | ICD-10-CM

## 2019-12-09 DIAGNOSIS — N32.81 OVERACTIVE BLADDER: ICD-10-CM

## 2019-12-09 DIAGNOSIS — I10 BENIGN ESSENTIAL HYPERTENSION: ICD-10-CM

## 2019-12-09 DIAGNOSIS — N20.0 NEPHROLITHIASIS: ICD-10-CM

## 2019-12-09 LAB
SL AMB  POCT GLUCOSE, UA: NEGATIVE
SL AMB LEUKOCYTE ESTERASE,UA: NEGATIVE
SL AMB POCT BILIRUBIN,UA: NEGATIVE
SL AMB POCT BLOOD,UA: ABNORMAL
SL AMB POCT CLARITY,UA: CLEAR
SL AMB POCT COLOR,UA: YELLOW
SL AMB POCT KETONES,UA: NEGATIVE
SL AMB POCT NITRITE,UA: NEGATIVE
SL AMB POCT PH,UA: 6
SL AMB POCT SPECIFIC GRAVITY,UA: 1.02
SL AMB POCT URINE PROTEIN: NEGATIVE
SL AMB POCT UROBILINOGEN: ABNORMAL

## 2019-12-09 PROCEDURE — 99214 OFFICE O/P EST MOD 30 MIN: CPT | Performed by: INTERNAL MEDICINE

## 2019-12-09 PROCEDURE — 3008F BODY MASS INDEX DOCD: CPT | Performed by: INTERNAL MEDICINE

## 2019-12-09 PROCEDURE — 3078F DIAST BP <80 MM HG: CPT | Performed by: INTERNAL MEDICINE

## 2019-12-09 PROCEDURE — 3074F SYST BP LT 130 MM HG: CPT | Performed by: INTERNAL MEDICINE

## 2019-12-09 PROCEDURE — 81003 URINALYSIS AUTO W/O SCOPE: CPT | Performed by: INTERNAL MEDICINE

## 2019-12-09 PROCEDURE — 1160F RVW MEDS BY RX/DR IN RCRD: CPT | Performed by: INTERNAL MEDICINE

## 2019-12-09 RX ORDER — TAMSULOSIN HYDROCHLORIDE 0.4 MG/1
0.4 CAPSULE ORAL
Qty: 30 CAPSULE | Refills: 1 | Status: SHIPPED | OUTPATIENT
Start: 2019-12-09 | End: 2020-02-06 | Stop reason: SDUPTHER

## 2019-12-09 NOTE — ASSESSMENT & PLAN NOTE
Lab Results   Component Value Date    HGBA1C 7 4 (H) 10/11/2019    Finger sticks have been well under 200 mg percent sore is unlikely is urinary frequency is due to hyperglycemia

## 2019-12-09 NOTE — ASSESSMENT & PLAN NOTE
Patient will be sent for urine studies as well as some blood work  We will start Flomax 0 4 mg daily  Patient has been cautioned not to use sildenafil in conjunction with Flomax    If symptoms continue patient should contact the office and we will arrange for ultrasound or CT scanning

## 2019-12-09 NOTE — PROGRESS NOTES
Assessment/Plan:    Nephrolithiasis  Patient will be sent for urine studies as well as some blood work  We will start Flomax 0 4 mg daily  Patient has been cautioned not to use sildenafil in conjunction with Flomax  If symptoms continue patient should contact the office and we will arrange for ultrasound or CT scanning    Overactive bladder  Urinalysis did not appear to indicate any acute cystitis  Type 2 diabetes mellitus with diabetic mononeuropathy, without long-term current use of insulin ([facility])    Lab Results   Component Value Date    HGBA1C 7 4 (H) 10/11/2019    Finger sticks have been well under 200 mg percent sore is unlikely is urinary frequency is due to hyperglycemia  Benign essential hypertension  Blood pressure is nicely controlled on current medications  Diagnoses and all orders for this visit:    Dysuria  -     POCT urine dip auto non-scope  -     CBC and differential; Future  -     Comprehensive metabolic panel; Future  -     C-reactive protein; Future  -     Uric acid; Future  -     Phosphorus; Future  -     PTH, intact; Future  -     Oxalic Acid, 42-PBAZ Urine  -     Calcium, 24 Hour Urine (w/ Creatinine)  -     Uric Acid, 24 Hour Urine (w/Creatinine)  -     Citrate, urine, 24 hour; Future  -     Oxalate, urine, 24 hour; Future  -     Sodium, urine, 24 hour; Future  -     Uric acid, urine, 24 hour; Future  -     Creatine, 24 Hour Urine  -     Calcium, urine, 24 hour; Future  -     Citrate, urine, 24 hour; Future  -     Oxalate, urine, 24 hour; Future  -     Uric acid, urine, 24 hour; Future    Nephrolithiasis  -     CBC and differential; Future  -     Comprehensive metabolic panel; Future  -     C-reactive protein; Future  -     Uric acid; Future  -     Phosphorus; Future  -     PTH, intact; Future  -     Oxalic Acid, 44-WUCD Urine  -     Calcium, 24 Hour Urine (w/ Creatinine)  -     Uric Acid, 24 Hour Urine (w/Creatinine)  -     Citrate, urine, 24 hour;  Future  -     Oxalate, urine, 24 hour; Future  -     Sodium, urine, 24 hour; Future  -     Uric acid, urine, 24 hour; Future  -     Creatine, 24 Hour Urine  -     Calcium, urine, 24 hour; Future  -     Citrate, urine, 24 hour; Future  -     Oxalate, urine, 24 hour; Future  -     Uric acid, urine, 24 hour; Future  -     tamsulosin (FLOMAX) 0 4 mg; Take 1 capsule (0 4 mg total) by mouth daily with dinner    Benign essential hypertension    Type 2 diabetes mellitus with diabetic mononeuropathy, without long-term current use of insulin (HCC)    Overactive bladder          Subjective:      Patient ID: Consuelo Byrne is a 71 y o  male  The patient presents to the office with complaints of bilateral groin discomfort associated with the passage of urinary gravel  He denies flank pain  He denies fevers or chills  He has had no nausea or vomiting  He presents to the office with a picture showing some small stones in the toilet bowl that were passed when urinating  He denies any hematuria        Family History   Problem Relation Age of Onset    Heart disease Mother     Hypertension Mother     Ulcers Father     Stomach cancer Father      Social History     Socioeconomic History    Marital status: /Civil Union     Spouse name: Not on file    Number of children: Not on file    Years of education: Not on file    Highest education level: Not on file   Occupational History    Not on file   Social Needs    Financial resource strain: Not on file    Food insecurity:     Worry: Not on file     Inability: Not on file    Transportation needs:     Medical: Not on file     Non-medical: Not on file   Tobacco Use    Smoking status: Never Smoker    Smokeless tobacco: Never Used   Substance and Sexual Activity    Alcohol use: No    Drug use: No    Sexual activity: Not on file   Lifestyle    Physical activity:     Days per week: Not on file     Minutes per session: Not on file    Stress: Not on file   Relationships    Social connections: Talks on phone: Not on file     Gets together: Not on file     Attends Nondenominational service: Not on file     Active member of club or organization: Not on file     Attends meetings of clubs or organizations: Not on file     Relationship status: Not on file    Intimate partner violence:     Fear of current or ex partner: Not on file     Emotionally abused: Not on file     Physically abused: Not on file     Forced sexual activity: Not on file   Other Topics Concern    Not on file   Social History Narrative    Not on file     Past Medical History:   Diagnosis Date    Abnormal echocardiogram 9/15/2017    Arthritis     OSTEOARTHRITIS OF LUMBAR SPINE    Carpal tunnel syndrome     Diabetes mellitus ([facility] [address] )     Elevated prostate specific antigen (PSA)     Erectile dysfunction     GERD (gastroesophageal reflux disease)     History of BPH     Hx of adenomatous colonic polyps     Hyperlipidemia     Hypertension     Hypertension        Current Outpatient Medications:     amLODIPine (NORVASC) 10 mg tablet, TAKE 1 TABLET (10 MG TOTAL) BY MOUTH DAILY, Disp: 90 tablet, Rfl: 1    candesartan (ATACAND) 32 MG tablet, TAKE 1 TABLET (32 MG TOTAL) BY MOUTH DAILY, Disp: 90 tablet, Rfl: 1    linaGLIPtin 5 MG TABS, Take 5 mg by mouth daily, Disp: 30 tablet, Rfl: 5    metFORMIN (GLUCOPHAGE) 1000 MG tablet, TAKE 1 TABLET TWICE DAILY  , Disp: 180 tablet, Rfl: 1    omega-3-acid ethyl esters (LOVAZA) 1 g capsule, Take 2 g by mouth, Disp: , Rfl:     omeprazole (PriLOSEC) 40 MG capsule, Take 1 capsule (40 mg total) by mouth daily, Disp: 90 capsule, Rfl: 3    oxybutynin (DITROPAN-XL) 10 MG 24 hr tablet, TAKE 1 TABLET BY MOUTH EVERY DAY, Disp: 90 tablet, Rfl: 3    rosuvastatin (CRESTOR) 20 MG tablet, TAKE 1 TABLET DAILY  , Disp: 90 tablet, Rfl: 1    sildenafil (VIAGRA) 100 mg tablet, Take 1 tablet (100 mg total) by mouth daily as needed for erectile dysfunction, Disp: 3 tablet, Rfl: 5    terbinafine (LamISIL) 1 % cream, Apply topically 2 (two) times a day, Disp: 30 g, Rfl: 0    ibuprofen (MOTRIN) 600 mg tablet, Take 1 tablet (600 mg total) by mouth every 8 (eight) hours as needed for mild pain for up to 90 days, Disp: 90 tablet, Rfl: 2    tamsulosin (FLOMAX) 0 4 mg, Take 1 capsule (0 4 mg total) by mouth daily with dinner, Disp: 30 capsule, Rfl: 1  No Known Allergies  Past Surgical History:   Procedure Laterality Date    APPENDECTOMY      CARPAL TUNNEL RELEASE      COLONOSCOPY      CYSTOSCOPY W/ DILATION OF BLADDER  01/09/2017    DR KILO LENZ, [facility]     EGD AND COLONOSCOPY N/A 2/8/2018    Procedure: EGD with biopsy AND COLONOSCOPY with polypectomy with hemo clip application;  Surgeon: Mattie Roldan MD;  Location: [facility]; Service: Gastroenterology    NEUROPLASTY / TRANSPOSITION MEDIAN NERVE AT CARPAL TUNNEL      LEFT & RIGHT         Review of Systems   Constitutional: Negative  HENT: Negative  Eyes: Negative  Respiratory: Negative  Cardiovascular: Negative  Gastrointestinal: Negative  Genitourinary: Positive for difficulty urinating (Bilateral suprapubic discomfort radiating into the groin), dysuria and urgency  Neurological: Negative  Hematological: Negative  Psychiatric/Behavioral: Negative  Objective:      /76 (BP Location: Left arm, Patient Position: Sitting, Cuff Size: Standard)   Pulse 66   Temp 98 1 °F (36 7 °C) (Oral)   Ht 5' 4" (1 626 m) Comment: with shoes  Wt 81 2 kg (179 lb) Comment: with shoes  SpO2 97%   BMI 30 73 kg/m²          Physical Exam   Constitutional: He is oriented to person, place, and time  He appears well-developed and well-nourished  No distress  HENT:   Head: Normocephalic and atraumatic  Eyes: Pupils are equal, round, and reactive to light  Conjunctivae are normal  No scleral icterus  Neck: Neck supple  No JVD present  No tracheal deviation present     Cardiovascular: Normal rate, regular rhythm and normal heart sounds  No murmur heard  Pulmonary/Chest: Effort normal and breath sounds normal  No respiratory distress  He has no wheezes  He has no rales  Abdominal: Soft  He exhibits no distension  There is no guarding  Genitourinary:   Genitourinary Comments: Unremarkable  No flank tenderness   Musculoskeletal: He exhibits no edema  Neurological: He is alert and oriented to person, place, and time  No focal motor deficits  Skin: Skin is warm and dry  No rash noted  He is not diaphoretic  No erythema  Psychiatric: He has a normal mood and affect   His behavior is normal

## 2019-12-10 ENCOUNTER — APPOINTMENT (OUTPATIENT)
Dept: LAB | Facility: HOSPITAL | Age: 69
End: 2019-12-10
Attending: INTERNAL MEDICINE
Payer: COMMERCIAL

## 2019-12-10 DIAGNOSIS — N20.0 NEPHROLITHIASIS: ICD-10-CM

## 2019-12-10 DIAGNOSIS — R30.0 DYSURIA: ICD-10-CM

## 2019-12-10 LAB
ALBUMIN SERPL BCP-MCNC: 3.7 G/DL (ref 3.5–5)
ALP SERPL-CCNC: 69 U/L (ref 46–116)
ALT SERPL W P-5'-P-CCNC: 22 U/L (ref 12–78)
ANION GAP SERPL CALCULATED.3IONS-SCNC: 10 MMOL/L (ref 4–13)
AST SERPL W P-5'-P-CCNC: 15 U/L (ref 5–45)
BASOPHILS # BLD AUTO: 0.04 THOUSANDS/ΜL (ref 0–0.1)
BASOPHILS NFR BLD AUTO: 1 % (ref 0–1)
BILIRUB SERPL-MCNC: 0.72 MG/DL (ref 0.2–1)
BUN SERPL-MCNC: 18 MG/DL (ref 5–25)
CALCIUM SERPL-MCNC: 9 MG/DL (ref 8.3–10.1)
CHLORIDE SERPL-SCNC: 102 MMOL/L (ref 100–108)
CO2 SERPL-SCNC: 24 MMOL/L (ref 21–32)
CREAT SERPL-MCNC: 1.09 MG/DL (ref 0.6–1.3)
CRP SERPL QL: <3 MG/L
EOSINOPHIL # BLD AUTO: 0.07 THOUSAND/ΜL (ref 0–0.61)
EOSINOPHIL NFR BLD AUTO: 1 % (ref 0–6)
ERYTHROCYTE [DISTWIDTH] IN BLOOD BY AUTOMATED COUNT: 13.2 % (ref 11.6–15.1)
GFR SERPL CREATININE-BSD FRML MDRD: 69 ML/MIN/1.73SQ M
GLUCOSE SERPL-MCNC: 197 MG/DL (ref 65–140)
HCT VFR BLD AUTO: 42.3 % (ref 36.5–49.3)
HGB BLD-MCNC: 13.9 G/DL (ref 12–17)
IMM GRANULOCYTES # BLD AUTO: 0.01 THOUSAND/UL (ref 0–0.2)
IMM GRANULOCYTES NFR BLD AUTO: 0 % (ref 0–2)
LYMPHOCYTES # BLD AUTO: 2.52 THOUSANDS/ΜL (ref 0.6–4.47)
LYMPHOCYTES NFR BLD AUTO: 36 % (ref 14–44)
MCH RBC QN AUTO: 29.4 PG (ref 26.8–34.3)
MCHC RBC AUTO-ENTMCNC: 32.9 G/DL (ref 31.4–37.4)
MCV RBC AUTO: 90 FL (ref 82–98)
MONOCYTES # BLD AUTO: 0.54 THOUSAND/ΜL (ref 0.17–1.22)
MONOCYTES NFR BLD AUTO: 8 % (ref 4–12)
NEUTROPHILS # BLD AUTO: 3.92 THOUSANDS/ΜL (ref 1.85–7.62)
NEUTS SEG NFR BLD AUTO: 54 % (ref 43–75)
NRBC BLD AUTO-RTO: 0 /100 WBCS
PHOSPHATE SERPL-MCNC: 3.6 MG/DL (ref 2.3–4.1)
PLATELET # BLD AUTO: 211 THOUSANDS/UL (ref 149–390)
PMV BLD AUTO: 9.8 FL (ref 8.9–12.7)
POTASSIUM SERPL-SCNC: 4 MMOL/L (ref 3.5–5.3)
PROT SERPL-MCNC: 7.4 G/DL (ref 6.4–8.2)
PTH-INTACT SERPL-MCNC: 38.5 PG/ML (ref 18.4–80.1)
RBC # BLD AUTO: 4.72 MILLION/UL (ref 3.88–5.62)
SODIUM SERPL-SCNC: 136 MMOL/L (ref 136–145)
URATE SERPL-MCNC: 4.4 MG/DL (ref 4.2–8)
WBC # BLD AUTO: 7.1 THOUSAND/UL (ref 4.31–10.16)

## 2019-12-10 PROCEDURE — 84550 ASSAY OF BLOOD/URIC ACID: CPT

## 2019-12-10 PROCEDURE — 82507 ASSAY OF CITRATE: CPT

## 2019-12-10 PROCEDURE — 86140 C-REACTIVE PROTEIN: CPT

## 2019-12-10 PROCEDURE — 85025 COMPLETE CBC W/AUTO DIFF WBC: CPT

## 2019-12-10 PROCEDURE — 84100 ASSAY OF PHOSPHORUS: CPT

## 2019-12-10 PROCEDURE — 36415 COLL VENOUS BLD VENIPUNCTURE: CPT

## 2019-12-10 PROCEDURE — 80053 COMPREHEN METABOLIC PANEL: CPT

## 2019-12-10 PROCEDURE — 83970 ASSAY OF PARATHORMONE: CPT

## 2019-12-11 ENCOUNTER — APPOINTMENT (OUTPATIENT)
Dept: LAB | Facility: HOSPITAL | Age: 69
End: 2019-12-11
Attending: INTERNAL MEDICINE
Payer: COMMERCIAL

## 2019-12-11 DIAGNOSIS — R30.0 DYSURIA: ICD-10-CM

## 2019-12-11 DIAGNOSIS — N20.0 NEPHROLITHIASIS: ICD-10-CM

## 2019-12-11 DIAGNOSIS — N20.0 NEPHROLITHIASIS: Primary | ICD-10-CM

## 2019-12-11 LAB
CALCIUM 24H UR-MCNC: <112.5 MG/24 HRS (ref 42–353)
CREAT 24H UR-MRATE: 1.6 G/24HR (ref 0.8–1.8)
PERIOD: 24 HOURS
PERIOD: 24 HOURS
SODIUM 24H UR-SRATE: >675 MMOL/24 HRS (ref 40–220)
SPECIMEN VOL UR: 2250 ML
URATE 24H UR-MCNC: 346.5 MG/24 HRS (ref 150–990)

## 2019-12-11 PROCEDURE — 82570 ASSAY OF URINE CREATININE: CPT

## 2019-12-11 PROCEDURE — 84560 ASSAY OF URINE/URIC ACID: CPT

## 2019-12-11 PROCEDURE — 83945 ASSAY OF OXALATE: CPT

## 2019-12-11 PROCEDURE — 84300 ASSAY OF URINE SODIUM: CPT

## 2019-12-11 PROCEDURE — 82340 ASSAY OF CALCIUM IN URINE: CPT

## 2019-12-13 LAB
CITRATE 24H UR-MCNC: 313 MG/L
CITRATE 24H UR-MRATE: 704 MG/24 HR (ref 320–1240)
OXALATE 24H UR-MRATE: 20 MG/24 HR (ref 7–44)
OXALATE UR-MCNC: 9 MG/L

## 2020-01-16 DIAGNOSIS — E78.00 HYPERCHOLESTEROLEMIA: ICD-10-CM

## 2020-01-16 RX ORDER — ROSUVASTATIN CALCIUM 20 MG/1
TABLET, COATED ORAL
Qty: 90 TABLET | Refills: 0 | Status: SHIPPED | OUTPATIENT
Start: 2020-01-16 | End: 2020-02-18 | Stop reason: SDUPTHER

## 2020-02-06 DIAGNOSIS — N20.0 NEPHROLITHIASIS: ICD-10-CM

## 2020-02-06 DIAGNOSIS — I42.0 DILATED CARDIOMYOPATHY (HCC): ICD-10-CM

## 2020-02-06 DIAGNOSIS — I10 ESSENTIAL HYPERTENSION: ICD-10-CM

## 2020-02-06 DIAGNOSIS — K21.9 CHRONIC GERD: ICD-10-CM

## 2020-02-06 DIAGNOSIS — E11.41 TYPE 2 DIABETES MELLITUS WITH DIABETIC MONONEUROPATHY, WITHOUT LONG-TERM CURRENT USE OF INSULIN (HCC): ICD-10-CM

## 2020-02-06 DIAGNOSIS — I10 BENIGN ESSENTIAL HYPERTENSION: ICD-10-CM

## 2020-02-06 DIAGNOSIS — M47.816 SPONDYLOSIS OF LUMBAR REGION WITHOUT MYELOPATHY OR RADICULOPATHY: ICD-10-CM

## 2020-02-06 DIAGNOSIS — Z13.5 SCREENING FOR DIABETIC RETINOPATHY: ICD-10-CM

## 2020-02-06 RX ORDER — TAMSULOSIN HYDROCHLORIDE 0.4 MG/1
0.4 CAPSULE ORAL
Qty: 90 CAPSULE | Refills: 1 | Status: SHIPPED | OUTPATIENT
Start: 2020-02-06 | End: 2020-02-18 | Stop reason: SDUPTHER

## 2020-02-06 RX ORDER — CANDESARTAN 32 MG/1
32 TABLET ORAL DAILY
Qty: 90 TABLET | Refills: 1 | Status: SHIPPED | OUTPATIENT
Start: 2020-02-06 | End: 2020-02-18 | Stop reason: SDUPTHER

## 2020-02-06 RX ORDER — AMLODIPINE BESYLATE 10 MG/1
10 TABLET ORAL DAILY
Qty: 90 TABLET | Refills: 1 | Status: SHIPPED | OUTPATIENT
Start: 2020-02-06 | End: 2020-02-18 | Stop reason: SDUPTHER

## 2020-02-10 ENCOUNTER — APPOINTMENT (OUTPATIENT)
Dept: LAB | Facility: HOSPITAL | Age: 70
End: 2020-02-10
Attending: INTERNAL MEDICINE
Payer: COMMERCIAL

## 2020-02-10 DIAGNOSIS — E11.41 TYPE 2 DIABETES MELLITUS WITH DIABETIC MONONEUROPATHY, WITHOUT LONG-TERM CURRENT USE OF INSULIN (HCC): ICD-10-CM

## 2020-02-10 DIAGNOSIS — G93.9 BRAIN LESION: ICD-10-CM

## 2020-02-10 LAB
ALBUMIN SERPL BCP-MCNC: 3.7 G/DL (ref 3.5–5)
ALP SERPL-CCNC: 70 U/L (ref 46–116)
ALT SERPL W P-5'-P-CCNC: 33 U/L (ref 12–78)
ANION GAP SERPL CALCULATED.3IONS-SCNC: 8 MMOL/L (ref 4–13)
AST SERPL W P-5'-P-CCNC: 22 U/L (ref 5–45)
BILIRUB SERPL-MCNC: 0.49 MG/DL (ref 0.2–1)
BUN SERPL-MCNC: 15 MG/DL (ref 5–25)
CALCIUM SERPL-MCNC: 8.7 MG/DL (ref 8.3–10.1)
CHLORIDE SERPL-SCNC: 103 MMOL/L (ref 100–108)
CO2 SERPL-SCNC: 28 MMOL/L (ref 21–32)
CREAT SERPL-MCNC: 0.89 MG/DL (ref 0.6–1.3)
CREAT UR-MCNC: 139 MG/DL
ERYTHROCYTE [DISTWIDTH] IN BLOOD BY AUTOMATED COUNT: 13.8 % (ref 11.6–15.1)
EST. AVERAGE GLUCOSE BLD GHB EST-MCNC: 154 MG/DL
GFR SERPL CREATININE-BSD FRML MDRD: 87 ML/MIN/1.73SQ M
GLUCOSE P FAST SERPL-MCNC: 145 MG/DL (ref 65–99)
HBA1C MFR BLD: 7 % (ref 4.2–6.3)
HCT VFR BLD AUTO: 44.8 % (ref 36.5–49.3)
HGB BLD-MCNC: 14.4 G/DL (ref 12–17)
MCH RBC QN AUTO: 28.9 PG (ref 26.8–34.3)
MCHC RBC AUTO-ENTMCNC: 32.1 G/DL (ref 31.4–37.4)
MCV RBC AUTO: 90 FL (ref 82–98)
MICROALBUMIN UR-MCNC: 16.1 MG/L (ref 0–20)
MICROALBUMIN/CREAT 24H UR: 12 MG/G CREATININE (ref 0–30)
PLATELET # BLD AUTO: 198 THOUSANDS/UL (ref 149–390)
PMV BLD AUTO: 9.4 FL (ref 8.9–12.7)
POTASSIUM SERPL-SCNC: 4.3 MMOL/L (ref 3.5–5.3)
PROT SERPL-MCNC: 7.6 G/DL (ref 6.4–8.2)
RBC # BLD AUTO: 4.98 MILLION/UL (ref 3.88–5.62)
SODIUM SERPL-SCNC: 139 MMOL/L (ref 136–145)
WBC # BLD AUTO: 6.8 THOUSAND/UL (ref 4.31–10.16)

## 2020-02-10 PROCEDURE — 83036 HEMOGLOBIN GLYCOSYLATED A1C: CPT

## 2020-02-10 PROCEDURE — 82043 UR ALBUMIN QUANTITATIVE: CPT

## 2020-02-10 PROCEDURE — 82570 ASSAY OF URINE CREATININE: CPT

## 2020-02-10 PROCEDURE — 80053 COMPREHEN METABOLIC PANEL: CPT

## 2020-02-10 PROCEDURE — 85027 COMPLETE CBC AUTOMATED: CPT

## 2020-02-10 PROCEDURE — 36415 COLL VENOUS BLD VENIPUNCTURE: CPT

## 2020-02-17 ENCOUNTER — HOSPITAL ENCOUNTER (OUTPATIENT)
Dept: MRI IMAGING | Facility: HOSPITAL | Age: 70
Discharge: HOME | End: 2020-02-17
Payer: COMMERCIAL

## 2020-02-17 DIAGNOSIS — G93.9 BRAIN LESION: ICD-10-CM

## 2020-02-17 PROCEDURE — A9585 GADOBUTROL INJECTION: HCPCS | Performed by: PHYSICIAN ASSISTANT

## 2020-02-17 PROCEDURE — 70553 MRI BRAIN STEM W/O & W/DYE: CPT

## 2020-02-17 RX ADMIN — GADOBUTROL 8 ML: 604.72 INJECTION INTRAVENOUS at 10:53

## 2020-02-18 ENCOUNTER — OFFICE VISIT (OUTPATIENT)
Dept: INTERNAL MEDICINE CLINIC | Facility: CLINIC | Age: 70
End: 2020-02-18
Payer: COMMERCIAL

## 2020-02-18 VITALS
HEIGHT: 63 IN | DIASTOLIC BLOOD PRESSURE: 64 MMHG | BODY MASS INDEX: 31.29 KG/M2 | TEMPERATURE: 98.7 F | WEIGHT: 176.6 LBS | OXYGEN SATURATION: 98 % | SYSTOLIC BLOOD PRESSURE: 117 MMHG | HEART RATE: 63 BPM

## 2020-02-18 DIAGNOSIS — E11.9 TYPE 2 DIABETES MELLITUS WITHOUT COMPLICATION, WITHOUT LONG-TERM CURRENT USE OF INSULIN (HCC): ICD-10-CM

## 2020-02-18 DIAGNOSIS — N20.0 NEPHROLITHIASIS: ICD-10-CM

## 2020-02-18 DIAGNOSIS — Z13.5 SCREENING FOR DIABETIC RETINOPATHY: ICD-10-CM

## 2020-02-18 DIAGNOSIS — E78.00 HYPERCHOLESTEROLEMIA: ICD-10-CM

## 2020-02-18 DIAGNOSIS — N39.41 URGE INCONTINENCE: ICD-10-CM

## 2020-02-18 DIAGNOSIS — E11.41 TYPE 2 DIABETES MELLITUS WITH DIABETIC MONONEUROPATHY, WITHOUT LONG-TERM CURRENT USE OF INSULIN (HCC): ICD-10-CM

## 2020-02-18 DIAGNOSIS — M25.512 ACUTE PAIN OF LEFT SHOULDER: ICD-10-CM

## 2020-02-18 DIAGNOSIS — M47.816 SPONDYLOSIS OF LUMBAR REGION WITHOUT MYELOPATHY OR RADICULOPATHY: ICD-10-CM

## 2020-02-18 DIAGNOSIS — K21.9 CHRONIC GERD: ICD-10-CM

## 2020-02-18 DIAGNOSIS — I10 ESSENTIAL HYPERTENSION: ICD-10-CM

## 2020-02-18 DIAGNOSIS — D43.2 SUBEPENDYMOMA (HCC): ICD-10-CM

## 2020-02-18 DIAGNOSIS — Z23 NEED FOR PNEUMOCOCCAL VACCINE: Primary | ICD-10-CM

## 2020-02-18 DIAGNOSIS — I10 BENIGN ESSENTIAL HYPERTENSION: ICD-10-CM

## 2020-02-18 DIAGNOSIS — I42.0 DILATED CARDIOMYOPATHY (HCC): ICD-10-CM

## 2020-02-18 PROCEDURE — 99214 OFFICE O/P EST MOD 30 MIN: CPT | Performed by: INTERNAL MEDICINE

## 2020-02-18 PROCEDURE — 4040F PNEUMOC VAC/ADMIN/RCVD: CPT | Performed by: INTERNAL MEDICINE

## 2020-02-18 PROCEDURE — 3051F HG A1C>EQUAL 7.0%<8.0%: CPT | Performed by: INTERNAL MEDICINE

## 2020-02-18 PROCEDURE — 3008F BODY MASS INDEX DOCD: CPT | Performed by: INTERNAL MEDICINE

## 2020-02-18 PROCEDURE — G0009 ADMIN PNEUMOCOCCAL VACCINE: HCPCS

## 2020-02-18 PROCEDURE — 3078F DIAST BP <80 MM HG: CPT | Performed by: INTERNAL MEDICINE

## 2020-02-18 PROCEDURE — 1160F RVW MEDS BY RX/DR IN RCRD: CPT | Performed by: INTERNAL MEDICINE

## 2020-02-18 PROCEDURE — 3074F SYST BP LT 130 MM HG: CPT | Performed by: INTERNAL MEDICINE

## 2020-02-18 PROCEDURE — 1036F TOBACCO NON-USER: CPT | Performed by: INTERNAL MEDICINE

## 2020-02-18 PROCEDURE — 90732 PPSV23 VACC 2 YRS+ SUBQ/IM: CPT

## 2020-02-18 PROCEDURE — 4010F ACE/ARB THERAPY RXD/TAKEN: CPT | Performed by: INTERNAL MEDICINE

## 2020-02-18 RX ORDER — OMEPRAZOLE 40 MG/1
40 CAPSULE, DELAYED RELEASE ORAL DAILY
Qty: 90 CAPSULE | Refills: 1 | Status: SHIPPED | OUTPATIENT
Start: 2020-02-18 | End: 2020-08-25

## 2020-02-18 RX ORDER — TAMSULOSIN HYDROCHLORIDE 0.4 MG/1
0.4 CAPSULE ORAL
Qty: 90 CAPSULE | Refills: 1 | Status: SHIPPED | OUTPATIENT
Start: 2020-02-18 | End: 2020-08-07

## 2020-02-18 RX ORDER — OXYBUTYNIN CHLORIDE 10 MG/1
10 TABLET, EXTENDED RELEASE ORAL DAILY
Qty: 90 TABLET | Refills: 1 | Status: SHIPPED | OUTPATIENT
Start: 2020-02-18 | End: 2020-08-07

## 2020-02-18 RX ORDER — IBUPROFEN 600 MG/1
600 TABLET ORAL EVERY 8 HOURS PRN
Qty: 90 TABLET | Refills: 2 | Status: SHIPPED | OUTPATIENT
Start: 2020-02-18 | End: 2021-02-19 | Stop reason: SDUPTHER

## 2020-02-18 RX ORDER — ROSUVASTATIN CALCIUM 20 MG/1
20 TABLET, COATED ORAL DAILY
Qty: 90 TABLET | Refills: 1 | Status: SHIPPED | OUTPATIENT
Start: 2020-02-18 | End: 2020-11-05 | Stop reason: SDUPTHER

## 2020-02-18 RX ORDER — CANDESARTAN 32 MG/1
32 TABLET ORAL DAILY
Qty: 90 TABLET | Refills: 1 | Status: SHIPPED | OUTPATIENT
Start: 2020-02-18 | End: 2020-10-12 | Stop reason: SDUPTHER

## 2020-02-18 RX ORDER — AMLODIPINE BESYLATE 10 MG/1
10 TABLET ORAL DAILY
Qty: 90 TABLET | Refills: 1 | Status: SHIPPED | OUTPATIENT
Start: 2020-02-18 | End: 2020-09-16 | Stop reason: SDUPTHER

## 2020-02-18 NOTE — ASSESSMENT & PLAN NOTE
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

## 2020-02-18 NOTE — ASSESSMENT & PLAN NOTE
Lab Results   Component Value Date    HGBA1C 7 0 (H) 02/10/2020   Diabetic control is improved  He has only been taking his metformin once per day  We will increase his metformin to twice a day after breakfast and dinner and see where his A1c is in 3 months

## 2020-02-18 NOTE — PROGRESS NOTES
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  Patient has a follow-up with neuro surgery next week  Will defer to neuro surgery regarding additional follow-up    Screening for diabetic retinopathy  Referred to ophthalmology    Osteoarthritis of lumbar spine  Usually relieved with the use of ibuprofen 1 or 2 tablets daily    Nephrolithiasis  No recurrence of symptoms  Urine studies were essentially unremarkable  He did have high levels of sodium in his urine  Dilated cardiomyopathy (HCC)  No complaints of any shortness of breath, increased peripheral edema, orthopnea  Weight has been stable  Chronic GERD  Continues on omeprazole with control of symptoms despite his fairly frequent use of NSAIDs    Benign essential hypertension  Blood pressure is nicely controlled on current medications  Diagnoses and all orders for this visit:    Need for pneumococcal vaccine  -     PNEUMOCOCCAL POLYSACCHARIDE VACCINE 23-VALENT =>3YO SQ IM    Spondylosis of lumbar region without myelopathy or radiculopathy  -     ibuprofen (MOTRIN) 600 mg tablet; Take 1 tablet (600 mg total) by mouth every 8 (eight) hours as needed for mild pain  -     amLODIPine (NORVASC) 10 mg tablet; Take 1 tablet (10 mg total) by mouth daily    Acute pain of left shoulder  -     ibuprofen (MOTRIN) 600 mg tablet; Take 1 tablet (600 mg total) by mouth every 8 (eight) hours as needed for mild pain    Benign essential hypertension  -     amLODIPine (NORVASC) 10 mg tablet;  Take 1 tablet (10 mg total) by mouth daily    Screening for diabetic retinopathy  -     amLODIPine (NORVASC) 10 mg tablet; Take 1 tablet (10 mg total) by mouth daily    Type 2 diabetes mellitus with diabetic mononeuropathy, without long-term current use of insulin (HCC)  -     amLODIPine (NORVASC) 10 mg tablet; Take 1 tablet (10 mg total) by mouth daily  -     linaGLIPtin 5 MG TABS; Take 5 mg by mouth daily  -     Lipid panel; Future  -     HEMOGLOBIN A1C W/ EAG ESTIMATION; Future    Dilated cardiomyopathy (HCC)  -     amLODIPine (NORVASC) 10 mg tablet; Take 1 tablet (10 mg total) by mouth daily    Chronic GERD  -     omeprazole (PriLOSEC) 40 MG capsule; Take 1 capsule (40 mg total) by mouth daily    Essential hypertension  -     candesartan (ATACAND) 32 MG tablet; Take 1 tablet (32 mg total) by mouth daily  -     CBC and Platelet; Future  -     Comprehensive metabolic panel; Future    Hypercholesterolemia  -     rosuvastatin (CRESTOR) 20 MG tablet; Take 1 tablet (20 mg total) by mouth daily    Nephrolithiasis  -     tamsulosin (FLOMAX) 0 4 mg; Take 1 capsule (0 4 mg total) by mouth daily with dinner    Type 2 diabetes mellitus without complication, without long-term current use of insulin ([facility])  -     metFORMIN (GLUCOPHAGE) 1000 MG tablet; Take 1 tablet (1,000 mg total) by mouth 2 (two) times a day  -     CBC and Platelet; Future  -     Comprehensive metabolic panel; Future  -     Lipid panel; Future  -     HEMOGLOBIN A1C W/ EAG ESTIMATION; Future    Urge incontinence  -     oxybutynin (DITROPAN-XL) 10 MG 24 hr tablet; Take 1 tablet (10 mg total) by mouth daily    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[address] )          Subjective:      Patient ID: Salome Chase is a 71 y o  male  Patient presents to the office for follow-up visit for type 2 diabetes, hypertension, history of dilated cardiomyopathy  He has a history of a subependymoma and recently underwent MRI scanning which was reviewed with the patient  No change in the size of the subependymoma was noted    The patient is not complaining of any headaches or difficulties  He has some chronic back pain for which he takes ibuprofen with relief  He is not having any additional urinary complaints regard to his history of nephrolithiasis  Urine studies were reviewed and were essentially unremarkable  His hemoglobin A1c is 7% indicative of good control but he has only been taking his metformin once a day instead of the twice a day as prescribed  The patient was encouraged to take his ibuprofen twice a day  He is enquiring about the use of Viagra with his current medications  He is taking tamsulosin and was advised not to take the tamsulosin on days where he is considering using Viagra  Additional labs were unremarkable    CBC was within normal limits and his CMP was normal with the exception of an elevated fasting blood glucose      Family History   Problem Relation Age of Onset    Heart disease Mother     Hypertension Mother     Ulcers Father     Stomach cancer Father      Social History     Socioeconomic History    Marital status: /Civil Union     Spouse name: Not on file    Number of children: Not on file    Years of education: Not on file    Highest education level: Not on file   Occupational History    Not on file   Social Needs    Financial resource strain: Not on file    Food insecurity:     Worry: Not on file     Inability: Not on file    Transportation needs:     Medical: Not on file     Non-medical: Not on file   Tobacco Use    Smoking status: Never Smoker    Smokeless tobacco: Never Used   Substance and Sexual Activity    Alcohol use: No    Drug use: No    Sexual activity: Not on file   Lifestyle    Physical activity:     Days per week: Not on file     Minutes per session: Not on file    Stress: Not on file   Relationships    Social connections:     Talks on phone: Not on file     Gets together: Not on file     Attends Holiness service: Not on file     Active member of club or organization: Not on file Attends meetings of clubs or organizations: Not on file     Relationship status: Not on file    Intimate partner violence:     Fear of current or ex partner: Not on file     Emotionally abused: Not on file     Physically abused: Not on file     Forced sexual activity: Not on file   Other Topics Concern    Not on file   Social History Narrative    Not on file     Past Medical History:   Diagnosis Date    Abnormal echocardiogram 9/15/2017    Arthritis     OSTEOARTHRITIS OF LUMBAR SPINE    Carpal tunnel syndrome     Diabetes mellitus ([facility] [address] )     Elevated prostate specific antigen (PSA)     Erectile dysfunction     GERD (gastroesophageal reflux disease)     History of BPH     Hx of adenomatous colonic polyps     Hyperlipidemia     Hypertension     Hypertension        Current Outpatient Medications:     amLODIPine (NORVASC) 10 mg tablet, Take 1 tablet (10 mg total) by mouth daily, Disp: 90 tablet, Rfl: 1    candesartan (ATACAND) 32 MG tablet, Take 1 tablet (32 mg total) by mouth daily, Disp: 90 tablet, Rfl: 1    ibuprofen (MOTRIN) 600 mg tablet, Take 1 tablet (600 mg total) by mouth every 8 (eight) hours as needed for mild pain, Disp: 90 tablet, Rfl: 2    linaGLIPtin 5 MG TABS, Take 5 mg by mouth daily, Disp: 90 tablet, Rfl: 1    metFORMIN (GLUCOPHAGE) 1000 MG tablet, Take 1 tablet (1,000 mg total) by mouth 2 (two) times a day, Disp: 180 tablet, Rfl: 1    omeprazole (PriLOSEC) 40 MG capsule, Take 1 capsule (40 mg total) by mouth daily, Disp: 90 capsule, Rfl: 1    oxybutynin (DITROPAN-XL) 10 MG 24 hr tablet, Take 1 tablet (10 mg total) by mouth daily, Disp: 90 tablet, Rfl: 1    rosuvastatin (CRESTOR) 20 MG tablet, Take 1 tablet (20 mg total) by mouth daily, Disp: 90 tablet, Rfl: 1    sildenafil (VIAGRA) 100 mg tablet, Take 1 tablet (100 mg total) by mouth daily as needed for erectile dysfunction, Disp: 3 tablet, Rfl: 5    tamsulosin (FLOMAX) 0 4 mg, Take 1 capsule (0 4 mg total) by mouth daily with dinner, Disp: 90 capsule, Rfl: 1    omega-3-acid ethyl esters (LOVAZA) 1 g capsule, Take 2 g by mouth, Disp: , Rfl:     terbinafine (LamISIL) 1 % cream, Apply topically 2 (two) times a day (Patient not taking: Reported on 2/18/2020), Disp: 30 g, Rfl: 0  No Known Allergies  Past Surgical History:   Procedure Laterality Date    APPENDECTOMY      CARPAL TUNNEL RELEASE      COLONOSCOPY      CYSTOSCOPY W/ DILATION OF BLADDER  01/09/2017    DR KILO LENZ, [facility], [place], operated by [facility]     EGD AND COLONOSCOPY N/A 2/8/2018    Procedure: EGD with biopsy AND COLONOSCOPY with polypectomy with hemo clip application;  Surgeon: Jaja Merlos MD;  Location: [facility]; Service: Gastroenterology    NEUROPLASTY / TRANSPOSITION MEDIAN NERVE AT CARPAL TUNNEL      LEFT & RIGHT         Review of Systems   Constitutional: Negative  HENT: Negative  Eyes: Negative  Respiratory: Negative  Cardiovascular: Negative  Gastrointestinal: Negative  Genitourinary: Negative  Musculoskeletal: Positive for back pain  Neurological: Negative  Hematological: Negative  Psychiatric/Behavioral: Negative  Objective:      /64 (BP Location: Left arm, Patient Position: Sitting, Cuff Size: Standard)   Pulse 63   Temp 98 7 °F (37 1 °C) (Oral)   Ht 5' 3 31" (1 608 m) Comment: with shoes  Wt 80 1 kg (176 lb 9 6 oz) Comment: with shoes  SpO2 98%   BMI 30 98 kg/m²          Physical Exam   Constitutional: He is oriented to person, place, and time  He appears well-developed and well-nourished  No distress  HENT:   Head: Normocephalic and atraumatic  Right Ear: External ear normal    Left Ear: External ear normal    Eyes: Pupils are equal, round, and reactive to light  Conjunctivae are normal  No scleral icterus  Neck: Neck supple  No JVD present  No tracheal deviation present  No thyromegaly present  Cardiovascular: Normal rate, regular rhythm and normal heart sounds     No murmur heard   Pulmonary/Chest: Effort normal and breath sounds normal  No respiratory distress  He has no wheezes  He has no rales  Abdominal: Soft  Bowel sounds are normal  He exhibits no distension  Lymphadenopathy:     He has no cervical adenopathy  Neurological: He is alert and oriented to person, place, and time  No cranial nerve deficit  Coordination normal    No focal motor deficits are appreciated  Skin: Skin is warm and dry  Capillary refill takes less than 2 seconds  No rash noted  He is not diaphoretic  No erythema  Vitals reviewed

## 2020-02-18 NOTE — ASSESSMENT & PLAN NOTE
No complaints of any shortness of breath, increased peripheral edema, orthopnea  Weight has been stable

## 2020-02-18 NOTE — ASSESSMENT & PLAN NOTE
No recurrence of symptoms  Urine studies were essentially unremarkable  He did have high levels of sodium in his urine

## 2020-02-26 ENCOUNTER — OFFICE VISIT (OUTPATIENT)
Dept: NEUROSURGERY | Facility: CLINIC | Age: 70
End: 2020-02-26
Payer: COMMERCIAL

## 2020-02-26 VITALS
RESPIRATION RATE: 18 BRPM | SYSTOLIC BLOOD PRESSURE: 140 MMHG | WEIGHT: 176 LBS | BODY MASS INDEX: 31.18 KG/M2 | HEIGHT: 63 IN | TEMPERATURE: 98.7 F | DIASTOLIC BLOOD PRESSURE: 82 MMHG | HEART RATE: 87 BPM

## 2020-02-26 DIAGNOSIS — D49.6 BRAIN TUMOR (HCC): ICD-10-CM

## 2020-02-26 DIAGNOSIS — D43.2 SUBEPENDYMOMA (HCC): ICD-10-CM

## 2020-02-26 DIAGNOSIS — R90.89 ABNORMAL FINDING ON MRI OF BRAIN: Primary | ICD-10-CM

## 2020-02-26 PROCEDURE — 1160F RVW MEDS BY RX/DR IN RCRD: CPT | Performed by: PHYSICIAN ASSISTANT

## 2020-02-26 PROCEDURE — 1036F TOBACCO NON-USER: CPT | Performed by: PHYSICIAN ASSISTANT

## 2020-02-26 PROCEDURE — 99213 OFFICE O/P EST LOW 20 MIN: CPT | Performed by: PHYSICIAN ASSISTANT

## 2020-02-26 PROCEDURE — 3079F DIAST BP 80-89 MM HG: CPT | Performed by: PHYSICIAN ASSISTANT

## 2020-02-26 PROCEDURE — 3008F BODY MASS INDEX DOCD: CPT | Performed by: PHYSICIAN ASSISTANT

## 2020-02-26 PROCEDURE — 3077F SYST BP >= 140 MM HG: CPT | Performed by: PHYSICIAN ASSISTANT

## 2020-02-26 PROCEDURE — 4040F PNEUMOC VAC/ADMIN/RCVD: CPT | Performed by: PHYSICIAN ASSISTANT

## 2020-02-26 PROCEDURE — 3051F HG A1C>EQUAL 7.0%<8.0%: CPT | Performed by: PHYSICIAN ASSISTANT
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
## 2020-02-26 NOTE — PATIENT INSTRUCTIONS
Continue with all usual activities without restriction  Continue to follow with ENT relative to year hearing issues  Further follow-up with Neurosurgery in approximately 6 months  MRI brain with without contrast a week or so prior to follow-up visit  Return sooner with any new changes such as worsening headaches, balance issues near falls, bladder incontinence, dizziness
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
## 2020-06-16 ENCOUNTER — APPOINTMENT (OUTPATIENT)
Dept: LAB | Facility: HOSPITAL | Age: 70
End: 2020-06-16
Attending: INTERNAL MEDICINE
Payer: COMMERCIAL

## 2020-06-16 DIAGNOSIS — E11.41 TYPE 2 DIABETES MELLITUS WITH DIABETIC MONONEUROPATHY, WITHOUT LONG-TERM CURRENT USE OF INSULIN (HCC): ICD-10-CM

## 2020-06-16 DIAGNOSIS — E11.9 TYPE 2 DIABETES MELLITUS WITHOUT COMPLICATION, WITHOUT LONG-TERM CURRENT USE OF INSULIN (HCC): ICD-10-CM

## 2020-06-16 DIAGNOSIS — I10 ESSENTIAL HYPERTENSION: ICD-10-CM

## 2020-06-16 LAB
ALBUMIN SERPL BCP-MCNC: 3.7 G/DL (ref 3.5–5)
ALP SERPL-CCNC: 73 U/L (ref 46–116)
ALT SERPL W P-5'-P-CCNC: 28 U/L (ref 12–78)
ANION GAP SERPL CALCULATED.3IONS-SCNC: 6 MMOL/L (ref 4–13)
AST SERPL W P-5'-P-CCNC: 19 U/L (ref 5–45)
BILIRUB SERPL-MCNC: 0.49 MG/DL (ref 0.2–1)
BUN SERPL-MCNC: 18 MG/DL (ref 5–25)
CALCIUM SERPL-MCNC: 9.1 MG/DL (ref 8.3–10.1)
CHLORIDE SERPL-SCNC: 102 MMOL/L (ref 100–108)
CHOLEST SERPL-MCNC: 116 MG/DL (ref 50–200)
CO2 SERPL-SCNC: 28 MMOL/L (ref 21–32)
CREAT SERPL-MCNC: 0.88 MG/DL (ref 0.6–1.3)
ERYTHROCYTE [DISTWIDTH] IN BLOOD BY AUTOMATED COUNT: 13.8 % (ref 11.6–15.1)
EST. AVERAGE GLUCOSE BLD GHB EST-MCNC: 134 MG/DL
GFR SERPL CREATININE-BSD FRML MDRD: 87 ML/MIN/1.73SQ M
GLUCOSE P FAST SERPL-MCNC: 137 MG/DL (ref 65–99)
HBA1C MFR BLD: 6.3 %
HCT VFR BLD AUTO: 42 % (ref 36.5–49.3)
HDLC SERPL-MCNC: 40 MG/DL
HGB BLD-MCNC: 13.9 G/DL (ref 12–17)
LDLC SERPL CALC-MCNC: 55 MG/DL (ref 0–100)
MCH RBC QN AUTO: 29.7 PG (ref 26.8–34.3)
MCHC RBC AUTO-ENTMCNC: 33.1 G/DL (ref 31.4–37.4)
MCV RBC AUTO: 90 FL (ref 82–98)
NONHDLC SERPL-MCNC: 76 MG/DL
PLATELET # BLD AUTO: 218 THOUSANDS/UL (ref 149–390)
PMV BLD AUTO: 9.4 FL (ref 8.9–12.7)
POTASSIUM SERPL-SCNC: 4.1 MMOL/L (ref 3.5–5.3)
PROT SERPL-MCNC: 7.5 G/DL (ref 6.4–8.2)
RBC # BLD AUTO: 4.68 MILLION/UL (ref 3.88–5.62)
SODIUM SERPL-SCNC: 136 MMOL/L (ref 136–145)
TRIGL SERPL-MCNC: 107 MG/DL
WBC # BLD AUTO: 8.29 THOUSAND/UL (ref 4.31–10.16)

## 2020-06-16 PROCEDURE — 85027 COMPLETE CBC AUTOMATED: CPT

## 2020-06-16 PROCEDURE — 80053 COMPREHEN METABOLIC PANEL: CPT

## 2020-06-16 PROCEDURE — 80061 LIPID PANEL: CPT

## 2020-06-16 PROCEDURE — 36415 COLL VENOUS BLD VENIPUNCTURE: CPT

## 2020-06-16 PROCEDURE — 83036 HEMOGLOBIN GLYCOSYLATED A1C: CPT

## 2020-06-19 ENCOUNTER — OFFICE VISIT (OUTPATIENT)
Dept: INTERNAL MEDICINE CLINIC | Facility: CLINIC | Age: 70
End: 2020-06-19
Payer: COMMERCIAL

## 2020-06-19 VITALS
SYSTOLIC BLOOD PRESSURE: 110 MMHG | TEMPERATURE: 97.6 F | WEIGHT: 175.6 LBS | HEART RATE: 81 BPM | OXYGEN SATURATION: 97 % | DIASTOLIC BLOOD PRESSURE: 70 MMHG | BODY MASS INDEX: 31.11 KG/M2 | HEIGHT: 63 IN

## 2020-06-19 DIAGNOSIS — H90.42 SENSORINEURAL HEARING LOSS (SNHL) OF LEFT EAR WITH UNRESTRICTED HEARING OF RIGHT EAR: ICD-10-CM

## 2020-06-19 DIAGNOSIS — E11.69 ONYCHOMYCOSIS OF MULTIPLE TOENAILS WITH TYPE 2 DIABETES MELLITUS (HCC): ICD-10-CM

## 2020-06-19 DIAGNOSIS — N20.0 NEPHROLITHIASIS: ICD-10-CM

## 2020-06-19 DIAGNOSIS — E11.41 TYPE 2 DIABETES MELLITUS WITH DIABETIC MONONEUROPATHY, WITHOUT LONG-TERM CURRENT USE OF INSULIN (HCC): Primary | ICD-10-CM

## 2020-06-19 DIAGNOSIS — Z00.00 MEDICARE ANNUAL WELLNESS VISIT, INITIAL: ICD-10-CM

## 2020-06-19 DIAGNOSIS — B35.1 ONYCHOMYCOSIS OF MULTIPLE TOENAILS WITH TYPE 2 DIABETES MELLITUS (HCC): ICD-10-CM

## 2020-06-19 PROCEDURE — 1125F AMNT PAIN NOTED PAIN PRSNT: CPT | Performed by: INTERNAL MEDICINE

## 2020-06-19 PROCEDURE — 1170F FXNL STATUS ASSESSED: CPT | Performed by: INTERNAL MEDICINE

## 2020-06-19 PROCEDURE — 99214 OFFICE O/P EST MOD 30 MIN: CPT | Performed by: INTERNAL MEDICINE

## 2020-06-19 PROCEDURE — 3078F DIAST BP <80 MM HG: CPT | Performed by: INTERNAL MEDICINE

## 2020-06-19 PROCEDURE — 3044F HG A1C LEVEL LT 7.0%: CPT | Performed by: INTERNAL MEDICINE

## 2020-06-19 PROCEDURE — 3008F BODY MASS INDEX DOCD: CPT | Performed by: INTERNAL MEDICINE

## 2020-06-19 PROCEDURE — 1160F RVW MEDS BY RX/DR IN RCRD: CPT | Performed by: INTERNAL MEDICINE

## 2020-06-19 PROCEDURE — G0439 PPPS, SUBSEQ VISIT: HCPCS | Performed by: INTERNAL MEDICINE

## 2020-06-19 PROCEDURE — 4040F PNEUMOC VAC/ADMIN/RCVD: CPT | Performed by: INTERNAL MEDICINE

## 2020-06-19 PROCEDURE — 1036F TOBACCO NON-USER: CPT | Performed by: INTERNAL MEDICINE

## 2020-06-19 PROCEDURE — 3074F SYST BP LT 130 MM HG: CPT | Performed by: INTERNAL MEDICINE

## 2020-06-19 RX ORDER — TERBINAFINE HYDROCHLORIDE 250 MG/1
250 TABLET ORAL DAILY
Qty: 28 TABLET | Refills: 2 | Status: SHIPPED | OUTPATIENT
Start: 2020-06-19 | End: 2020-10-12 | Stop reason: SDUPTHER

## 2020-08-06 DIAGNOSIS — N20.0 NEPHROLITHIASIS: ICD-10-CM

## 2020-08-06 DIAGNOSIS — E11.9 TYPE 2 DIABETES MELLITUS WITHOUT COMPLICATION, WITHOUT LONG-TERM CURRENT USE OF INSULIN (HCC): ICD-10-CM

## 2020-08-06 DIAGNOSIS — N39.41 URGE INCONTINENCE: ICD-10-CM

## 2020-08-07 RX ORDER — OXYBUTYNIN CHLORIDE 10 MG/1
TABLET, EXTENDED RELEASE ORAL
Qty: 90 TABLET | Refills: 1 | Status: SHIPPED | OUTPATIENT
Start: 2020-08-07 | End: 2021-01-29

## 2020-08-07 RX ORDER — TAMSULOSIN HYDROCHLORIDE 0.4 MG/1
CAPSULE ORAL
Qty: 90 CAPSULE | Refills: 1 | Status: SHIPPED | OUTPATIENT
Start: 2020-08-07 | End: 2021-01-29

## 2020-08-22 DIAGNOSIS — K21.9 CHRONIC GERD: ICD-10-CM

## 2020-08-25 RX ORDER — OMEPRAZOLE 40 MG/1
CAPSULE, DELAYED RELEASE ORAL
Qty: 90 CAPSULE | Refills: 1 | Status: SHIPPED | OUTPATIENT
Start: 2020-08-25 | End: 2021-02-14

## 2020-09-07 ENCOUNTER — HOSPITAL ENCOUNTER (EMERGENCY)
Facility: HOSPITAL | Age: 70
Discharge: HOME | End: 2020-09-07
Attending: EMERGENCY MEDICINE
Payer: COMMERCIAL

## 2020-09-07 VITALS
WEIGHT: 173.5 LBS | OXYGEN SATURATION: 98 % | DIASTOLIC BLOOD PRESSURE: 87 MMHG | BODY MASS INDEX: 30.36 KG/M2 | RESPIRATION RATE: 16 BRPM | TEMPERATURE: 98.1 F | SYSTOLIC BLOOD PRESSURE: 153 MMHG | HEART RATE: 62 BPM

## 2020-09-07 DIAGNOSIS — H57.9 VISUAL COMPLAINT: Primary | ICD-10-CM

## 2020-09-07 LAB — GLUCOSE SERPL-MCNC: 117 MG/DL (ref 65–140)

## 2020-09-07 PROCEDURE — 76512 OPH US DX B-SCAN: CPT | Performed by: EMERGENCY MEDICINE

## 2020-09-07 PROCEDURE — 99284 EMERGENCY DEPT VISIT MOD MDM: CPT

## 2020-09-07 PROCEDURE — 99284 EMERGENCY DEPT VISIT MOD MDM: CPT | Performed by: EMERGENCY MEDICINE

## 2020-09-07 PROCEDURE — 82948 REAGENT STRIP/BLOOD GLUCOSE: CPT

## 2020-09-07 RX ORDER — TETRACAINE HYDROCHLORIDE 5 MG/ML
2 SOLUTION OPHTHALMIC ONCE
Status: COMPLETED | OUTPATIENT
Start: 2020-09-07 | End: 2020-09-07

## 2020-09-07 RX ADMIN — TETRACAINE HYDROCHLORIDE 2 DROP: 5 SOLUTION OPHTHALMIC at 12:06

## 2020-09-07 RX ADMIN — FLUORESCEIN SODIUM 1 STRIP: 1 STRIP OPHTHALMIC at 12:05

## 2020-09-07 NOTE — ED PROVIDER NOTES
History  Chief Complaint   Patient presents with    Loss of Vision     reports decreased vision right eye since yest, states blurry, diplopia        History provided by:  Patient   used: No    Medical Problem   Quality:  Droopy eye, visual problem  Severity:  Mild  Onset quality:  Gradual  Duration:  1 day  Timing:  Intermittent  Progression:  Waxing and waning  Chronicity:  New  Context:  Patient's significant other states that she noticed that his right eye appeared 'droopy' last evening and patient felt decreased/blurred vision in the same  Relieved by:  Nothing  Worsened by:  Nothing  Ineffective treatments:  None  Associated symptoms: no abdominal pain, no chest pain, no cough, no fever, no headaches, no loss of consciousness, no rash, no shortness of breath, no sore throat, no vomiting and no wheezing    Risk factors:  DM, HTN      Prior to Admission Medications   Prescriptions Last Dose Informant Patient Reported? Taking?    amLODIPine (NORVASC) 10 mg tablet   No No   Sig: Take 1 tablet (10 mg total) by mouth daily   candesartan (ATACAND) 32 MG tablet   No No   Sig: Take 1 tablet (32 mg total) by mouth daily   ibuprofen (MOTRIN) 600 mg tablet   No No   Sig: Take 1 tablet (600 mg total) by mouth every 8 (eight) hours as needed for mild pain   linaGLIPtin 5 MG TABS   No No   Sig: Take 5 mg by mouth daily   metFORMIN (GLUCOPHAGE) 1000 MG tablet   No No   Sig: TOME CATHY TABLETA DOS VECES AL LUIS ALBERTO   omega-3-acid ethyl esters (LOVAZA) 1 g capsule  Self Yes No   Sig: Take 2 g by mouth   omeprazole (PriLOSEC) 40 MG capsule   No No   Sig: TOME CATHY CAPSULA TODOS LOS ESCOBAR   oxybutynin (DITROPAN-XL) 10 MG 24 hr tablet   No No   Sig: TOME CATHY TABLETA TODOS LOS ESCOBAR   rosuvastatin (CRESTOR) 20 MG tablet   No No   Sig: Take 1 tablet (20 mg total) by mouth daily   sildenafil (VIAGRA) 100 mg tablet  Self No No   Sig: Take 1 tablet (100 mg total) by mouth daily as needed for erectile dysfunction   tamsulosin (FLOMAX) 0 4 mg   No No   Sig: TOME CATHY CAPSULA TODOS LOS ESCOBAR WITH DINNER   terbinafine (LamISIL) 250 mg tablet   No No   Sig: Take 1 tablet (250 mg total) by mouth daily      Facility-Administered Medications: None       Past Medical History:   Diagnosis Date    Abnormal echocardiogram 9/15/2017    Arthritis     OSTEOARTHRITIS OF LUMBAR SPINE    Carpal tunnel syndrome     Diabetes mellitus (HCC)     Elevated prostate specific antigen (PSA)     Erectile dysfunction     GERD (gastroesophageal reflux disease)     History of BPH     Hx of adenomatous colonic polyps     Hyperlipidemia     Hypertension     Hypertension        Past Surgical History:   Procedure Laterality Date    APPENDECTOMY      CARPAL TUNNEL RELEASE      COLONOSCOPY      CYSTOSCOPY W/ DILATION OF BLADDER  01/09/2017    DR KILO LENZ, [facility]     EGD AND COLONOSCOPY N/A 2/8/2018    Procedure: EGD with biopsy AND COLONOSCOPY with polypectomy with hemo clip application;  Surgeon: Darrell Mtz MD;  Location: [facility]; Service: Gastroenterology    NEUROPLASTY / TRANSPOSITION MEDIAN NERVE AT CARPAL TUNNEL      LEFT & RIGHT       Family History   Problem Relation Age of Onset    Heart disease Mother     Hypertension Mother     Ulcers Father     Stomach cancer Father      I have reviewed and agree with the history as documented  E-Cigarette/Vaping    E-Cigarette Use Never User      E-Cigarette/Vaping Substances     Social History     Tobacco Use    Smoking status: Never Smoker    Smokeless tobacco: Never Used   Substance Use Topics    Alcohol use: No    Drug use: No       Review of Systems   Constitutional: Negative for activity change, chills and fever  HENT: Negative for facial swelling, sore throat and trouble swallowing  Eyes: Positive for visual disturbance  Negative for pain  Respiratory: Negative for cough, chest tightness, shortness of breath and wheezing      Cardiovascular: Negative for chest pain and leg swelling  Gastrointestinal: Negative for abdominal pain and vomiting  Genitourinary: Negative for dysuria and flank pain  Musculoskeletal: Negative for back pain, neck pain and neck stiffness  Skin: Negative for pallor and rash  Allergic/Immunologic: Negative for environmental allergies and immunocompromised state  Neurological: Negative for dizziness, loss of consciousness and headaches  Hematological: Negative for adenopathy  Does not bruise/bleed easily  Psychiatric/Behavioral: Negative for agitation and behavioral problems  All other systems reviewed and are negative  Physical Exam  Physical Exam  Vitals signs and nursing note reviewed  Constitutional:       General: He is not in acute distress  Appearance: He is well-developed  HENT:      Head: Normocephalic and atraumatic  Comments: No temporal tenderenss  Eyes:      Extraocular Movements: Extraocular movements intact  Pupils: Pupils are equal, round, and reactive to light  Neck:      Musculoskeletal: Normal range of motion and neck supple  Cardiovascular:      Rate and Rhythm: Normal rate  Pulmonary:      Effort: Pulmonary effort is normal  No respiratory distress  Abdominal:      General: Bowel sounds are normal       Palpations: Abdomen is soft  Tenderness: There is no abdominal tenderness  There is no guarding or rebound  Musculoskeletal: Normal range of motion  Skin:     General: Skin is warm and dry  Neurological:      General: No focal deficit present  Mental Status: He is alert and oriented to person, place, and time  Cranial Nerves: No cranial nerve deficit  Motor: No weakness        Coordination: Coordination normal          Vital Signs  ED Triage Vitals [09/07/20 1111]   Temperature Pulse Respirations Blood Pressure SpO2   98 1 °F (36 7 °C) 62 16 153/87 98 %      Temp Source Heart Rate Source Patient Position - Orthostatic VS BP Location FiO2 (%)   Temporal -- Sitting Right arm --      Pain Score       No Pain           Vitals:    09/07/20 1111   BP: 153/87   Pulse: 62   Patient Position - Orthostatic VS: Sitting         Visual Acuity  Visual Acuity      Most Recent Value   Visual acuity R eye is  20/40   Visual acuity Left eye is  20/40   Visual acuity in both eyes is  20/30   Visual acuity uncorrected  20/30   Wearing corrective eyewear/lenses? No   No corrective eyewear/lenses  Yes   L Pupil Size (mm)  2   R Pupil Size (mm)  2          ED Medications  Medications   fluorescein sodium sterile ophthalmic strip 1 strip (1 strip Right Eye Given by Other 9/7/20 1205)   tetracaine 0 5 % ophthalmic solution 2 drop (2 drops Right Eye Given 9/7/20 1206)       Diagnostic Studies  Results Reviewed     Procedure Component Value Units Date/Time    Fingerstick Glucose (POCT) [[id number]]  (Normal) Collected:  09/07/20 1144    Lab Status:  Final result Updated:  09/07/20 1145     POC Glucose 117 mg/dl                  No orders to display              Procedures  Procedures         ED Course  ED Course as of Sep 07 1303   Mon Sep 07, 2020   1150 Visual acuity checked by RN, 20/40 equal in both eyes separately, 20/30 together  [address] [address] done, no Retinal Detachment seen  Image saved in Chart  1215 No corneal abrasion on Fluorescein staining  1216 Based on normal visual acuity, afebrile, non-tender exam, other negative findings as above, doubt stroke or temporal arteritis at this time; we will give follow up with Neuro and Ophtho  RTED for worsening symptoms  Patient and significant other ok with plan                      Stroke Assessment     Row Name 09/07/20 1219             NIH Stroke Scale    Interval  Baseline      Level of Consciousness (1a )  0      LOC Questions (1b )  0      LOC Commands (1c )  0      Best Gaze (2 )  0      Visual (3 )  0      Facial Palsy (4 )  0      Motor Arm, Left (5a )  0      Motor Arm, Right (5b )  0      Motor Leg, Left (6a )  0      Motor Leg, Right (6b )  0      Limb Ataxia (7 )  0      Sensory (8 )  0      Best Language (9 )  0      Dysarthria (10 )  0      Extinction and Inattention (11 ) (Formerly Neglect)  0      Total  0                                    MDM  Number of Diagnoses or Management Options  Visual complaint:   Diagnosis management comments: Patient with transient 'droopy' eye and blurred vision right eye yesterday, able to see still, visual acuity 20/40, US no RD, Fluorescein staining negative  No visual field cuts  Intact visual acuity, able to close eyes forcefully, no eye lid weakness  Well appearing, benign exam, we will give Ophtho and neuro follow up  Has scheduled MRI for brain by PCP  Discussed possible CT imaging although neuro intact, no deficts  Patient and significant other state that they will follow up with their doctor to see if an early MRI could be done  RTED for worsening symtpoms  Disposition  Final diagnoses:   Visual complaint     Time reflects when diagnosis was documented in both MDM as applicable and the Disposition within this note     Time User Action Codes Description Comment    9/7/2020 12:19 PM Laura Da Silva Add [H57 9] Visual complaint       ED Disposition     ED Disposition Condition Date/Time Comment    Discharge Stable Mon Sep 7, 2020 12:19 PM Andriy Breen discharge to home/self care  Follow-up Information     Follow up With Specialties Details Why Contact Info Additional [facility] Neurology Schedule an appointment as soon as possible for a visit   [address] [facility] [address]  [address] [place] [address] Neurology [facility], [address] [place] [address], [address]    [facility] for Critical access hospital  Schedule an appointment as soon as possible for a visit   [address]   [address] [facility] [address] [phone] [address] [facility] Emergency Department Emergency Medicine  If symptoms worsen [place] [address] [phone] [facility], [address]  , [place], [place], [address]          Discharge Medication List as of 9/7/2020 12:20 PM      CONTINUE these medications which have NOT CHANGED    Details   amLODIPine (NORVASC) 10 mg tablet Take 1 tablet (10 mg total) by mouth daily, Starting Tue 2/18/2020, Normal      candesartan (ATACAND) 32 MG tablet Take 1 tablet (32 mg total) by mouth daily, Starting Tue 2/18/2020, Normal      ibuprofen (MOTRIN) 600 mg tablet Take 1 tablet (600 mg total) by mouth every 8 (eight) hours as needed for mild pain, Starting Tue 2/18/2020, Until Mon 5/18/2020, Normal      linaGLIPtin 5 MG TABS Take 5 mg by mouth daily, Starting Tue 2/18/2020, Until Sun 8/16/2020, Normal      metFORMIN (GLUCOPHAGE) 1000 MG tablet TOME CATHY TABLETA DOS VECES AL LUIS ALBEROT, Normal      omega-3-acid ethyl esters (LOVAZA) 1 g capsule Take 2 g by mouth, Starting Wed 10/25/2017, Historical Med      omeprazole (PriLOSEC) 40 MG capsule TOME CATHY CAPSULA TODOS LOS ESCOBAR, Normal      oxybutynin (DITROPAN-XL) 10 MG 24 hr tablet TOME CATHY TABLETA TODOS LOS ESCOBAR, Normal      rosuvastatin (CRESTOR) 20 MG tablet Take 1 tablet (20 mg total) by mouth daily, Starting Tue 2/18/2020, Normal      sildenafil (VIAGRA) 100 mg tablet Take 1 tablet (100 mg total) by mouth daily as needed for erectile dysfunction, Starting Thu 7/12/2018, Normal      tamsulosin (FLOMAX) 0 4 mg TOME CATHY CAPSULA TODOS LOS ESCOBAR WITH DINNER, Normal      terbinafine (LamISIL) 250 mg tablet Take 1 tablet (250 mg total) by mouth daily, Starting Fri 6/19/2020, Until Fri 9/11/2020, Normal           No discharge procedures on file      PDMP Review     None          ED Provider  Electronically Signed by           Donna Welch MD  09/07/20 9687

## 2020-09-11 ENCOUNTER — HOSPITAL ENCOUNTER (OUTPATIENT)
Dept: MRI IMAGING | Facility: HOSPITAL | Age: 70
Discharge: HOME | End: 2020-09-11
Payer: COMMERCIAL

## 2020-09-11 DIAGNOSIS — R90.89 ABNORMAL FINDING ON MRI OF BRAIN: ICD-10-CM

## 2020-09-11 DIAGNOSIS — D43.2 SUBEPENDYMOMA (HCC): ICD-10-CM

## 2020-09-11 DIAGNOSIS — D49.6 BRAIN TUMOR (HCC): ICD-10-CM

## 2020-09-11 PROCEDURE — A9585 GADOBUTROL INJECTION: HCPCS | Performed by: PHYSICIAN ASSISTANT

## 2020-09-11 PROCEDURE — 70553 MRI BRAIN STEM W/O & W/DYE: CPT

## 2020-09-11 RX ADMIN — GADOBUTROL 7 ML: 604.72 INJECTION INTRAVENOUS at 13:23

## 2020-09-14 ENCOUNTER — OFFICE VISIT (OUTPATIENT)
Dept: NEUROSURGERY | Facility: CLINIC | Age: 70
End: 2020-09-14
Payer: COMMERCIAL

## 2020-09-14 VITALS
SYSTOLIC BLOOD PRESSURE: 110 MMHG | TEMPERATURE: 97.8 F | DIASTOLIC BLOOD PRESSURE: 60 MMHG | HEIGHT: 63 IN | BODY MASS INDEX: 30.12 KG/M2 | WEIGHT: 170 LBS

## 2020-09-14 DIAGNOSIS — D43.2 SUBEPENDYMOMA (HCC): Primary | ICD-10-CM

## 2020-09-14 PROCEDURE — 99214 OFFICE O/P EST MOD 30 MIN: CPT | Performed by: NURSE PRACTITIONER

## 2020-09-14 NOTE — PROGRESS NOTES
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  · Patient's initial MRI brain on 08/29/2019 showed mass 13-14 mm in size located between the dorsal mandibular and cerebellum from the caudal 4th ventricle  Patient then had a follow-up MRI in 3 months on 02/17/2020 which revealed a unchanged mass compared to prior imaging  Patient recently had another MRI brain for follow-up 6 months later on 9/11/20 which revealed stable sized posterior fossa mass interposed between the dorsal medulla and anterior to the cerebellar tonsils compatible with subependymoma  The mass is  measuring approximately 11 mm in maximum dimension  Once again there is no evidence of hydrocephalus or ventriculomegaly, 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  · Patient had recent ED visit on 9/7/20 for new onset of right eye droop with visual changes  No findings during ER stay but patient earlier today followed up with his ophthalmologist outpatient which stated 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  Likely secondary to type 2 diabetes  Hg A1C in June 2020 was 6 3  Imaging:    MRI brain 09/11/2020:Stable sized posterior fossa mass interposed between the dorsal medulla and anterior to the cerebellar tonsils compatible with subependymoma  The mass is heterogeneously enhancing as on the prior measuring approximately 11 mm in maximum dimension  Plan:  · Reviewed imaging with patient and daughter    · Patient and daughter aware of worsening symptoms of hydrocephalus and brain compression  Signs and symptoms include headaches, nausea/vomiting, seizures, or balance difficulties to go to ED for further evaluation  · Patient will follow-up in 1 year with repeat MRI brain w/wo for routine surveillance  · Reassured patient and daughter at his new right eyelid droop is not related to his subependymoma  · Recommend patient follow up with his ophthalmologist to be cleared from driving standpoint  · Educated patient if his double vision gets worse he may place an eye patch on his right eye to help with his vision  · At this time, no neurosurgical intervention is anticipated  · Discussed recommendations with patient  Patient showed understanding and was agreeable to recommendations  · Patient made aware to contact Neurosurgery with any questions or concerns  Diagnoses and all orders for this visit:    [facility]livia [facility]ccall[facility])  -     MRI brain with and without contrast; Future            CHIEF COMPLAINT    Chief Complaint   Patient presents with    Follow-up     Abnormal finding on MRI of brain       HISTORY    History of Present Illness     79y o  year old male with past medical history significant for hypertension, hyperlipidemia, BPH, GERD, and diabetes  Who presents to the office today for a six-month follow-up for a subependymoma  Patient is primarily [place] speaking, daughter was brought along for translation  This was an incidental posterior fossa mass discovered upon workup for hearing loss in August 2019  Patient then followed up in 3 months with repeat MRI brain which revealed stable unchanged mass compared to prior imaging measuring 13-14 mm in size  Patient today is following up 6 months later with an MRI brain which revealed stable size posterior fossa mass in her pose between the dorsal medulla and anterior to the cerebral tonsils compatible with subependymoma    Once again imaging revealed no evidence of hydrocephalus or ventriculomegaly  The patient and daughter understand should he develop any new symptoms such as headaches, dizziness, or balance issues to return sooner for reassessment  Patient currently complaining of a new right eye droop which has been going on for roughly 12 days  Per patient's daughter his right eye droop got worse Monday and he presented to the ED on 09/07/2020 for evaluation of new right eye droop and visual changes  Patient was discharged home and today followed up with his ophthalmologist outpatient which told patient 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  Likely secondary to diabetes  Reassured patient and daughter that this new onset of right eye droop is not related to his subependymoma  Patient offers no other complaints at this time  On exam today, patient is awake, alert and oriented x3, strength 5/5 throughout, reflexes are intact and symmetric, and sensation is grossly intact  There is no pronator drift and finger-to-nose is normal bilaterally  Gait balance is unremarkable  Patient with right eyelid droop and 3rd nerve palsy  Smile symmetrical and tongue midline  Patient endorses lower lumbar herniated disc which occasionally he feels back pain with associated numbness down his left leg which he reports is an ongoing problem  HPI    See Discussion    REVIEW OF SYSTEMS    Review of Systems   Constitutional: Negative  HENT: Negative  Eyes:        Right eye Drooping since Last Friday    Respiratory: Negative  Cardiovascular: Negative  Gastrointestinal: Negative  Endocrine: Negative  Genitourinary: Negative  Musculoskeletal: Negative  Skin: Negative  Allergic/Immunologic: Negative  Neurological: Negative  Hematological: Negative  Psychiatric/Behavioral: Negative  Reviewed ROS with patient and agree and changes made as needed      Meds/Allergies     Current Outpatient Medications   Medication Sig Dispense Refill    amLODIPine (NORVASC) 10 mg tablet Take 1 tablet (10 mg total) by mouth daily 90 tablet 1    candesartan (ATACAND) 32 MG tablet Take 1 tablet (32 mg total) by mouth daily 90 tablet 1    metFORMIN (GLUCOPHAGE) 1000 MG tablet TOME CATHY TABLETA DOS VECES AL LUIS ALBERTO 180 tablet 1    omega-3-acid ethyl esters (LOVAZA) 1 g capsule Take 2 g by mouth      omeprazole (PriLOSEC) 40 MG capsule TOME CATHY CAPSULA TODOS LOS ESCOBAR 90 capsule 1    oxybutynin (DITROPAN-XL) 10 MG 24 hr tablet TOME CATHY TABLETA TODOS LOS ESCOBAR 90 tablet 1    rosuvastatin (CRESTOR) 20 MG tablet Take 1 tablet (20 mg total) by mouth daily 90 tablet 1    sildenafil (VIAGRA) 100 mg tablet Take 1 tablet (100 mg total) by mouth daily as needed for erectile dysfunction 3 tablet 5    tamsulosin (FLOMAX) 0 4 mg TOME CATHY CAPSULA TOS LOS ESCOBAR WITH DINNER 90 capsule 1    ibuprofen (MOTRIN) 600 mg tablet Take 1 tablet (600 mg total) by mouth every 8 (eight) hours as needed for mild pain 90 tablet 2    linaGLIPtin 5 MG TABS Take 5 mg by mouth daily 90 tablet 1     No current facility-administered medications for this visit  No Known Allergies    PAST HISTORY    Past Medical History:   Diagnosis Date    Abnormal echocardiogram 9/15/2017    Arthritis     OSTEOARTHRITIS OF LUMBAR SPINE    Carpal tunnel syndrome     Diabetes mellitus (HCC)     Elevated prostate specific antigen (PSA)     Erectile dysfunction     GERD (gastroesophageal reflux disease)     History of BPH     Hx of adenomatous colonic polyps     Hyperlipidemia     Hypertension     Hypertension        Past Surgical History:   Procedure Laterality Date    APPENDECTOMY      CARPAL TUNNEL RELEASE      COLONOSCOPY      CYSTOSCOPY W/ DILATION OF BLADDER  01/09/2017    DR KILO LENZ, [facility]     EGD AND COLONOSCOPY N/A 2/8/2018    Procedure: EGD with biopsy AND COLONOSCOPY with polypectomy with hemo clip application;  Surgeon: Christiano Mcclain MD;  Location: [facility]; Service: Gastroenterology    NEUROPLASTY / TRANSPOSITION MEDIAN NERVE AT CARPAL TUNNEL      LEFT & RIGHT       Social History     Tobacco Use    Smoking status: Never Smoker    Smokeless tobacco: Never Used   Substance Use Topics    Alcohol use: No    Drug use: No       Family History   Problem Relation Age of Onset    Heart disease Mother     Hypertension Mother     Ulcers Father     Stomach cancer Father          Above history personally reviewed  EXAM    Vitals:Blood pressure 110/60, temperature 97 8 °F (36 6 °C), temperature source Temporal, height 5' 3 39" (1 61 m), weight 77 1 kg (170 lb)  ,Body mass index is 29 74 kg/m²  Physical Exam  Constitutional:       General: He is awake  He is not in acute distress  Appearance: Normal appearance  He is not ill-appearing  HENT:      Head: Normocephalic and atraumatic  Eyes:      Extraocular Movements:      Right eye: Abnormal extraocular motion present  Conjunctiva/sclera: Conjunctivae normal       Pupils: Pupils are equal, round, and reactive to light  Comments: Right eyelid droop with 3rd nerve palsy  EOM intact to L eye   Neck:      Musculoskeletal: Normal range of motion and neck supple  No spinous process tenderness or muscular tenderness  Pulmonary:      Effort: Pulmonary effort is normal  No respiratory distress  Chest:      Chest wall: No tenderness  Abdominal:      General: There is no distension  Palpations: Abdomen is soft  Tenderness: There is no abdominal tenderness  Musculoskeletal: Normal range of motion  General: No tenderness  Cervical back: He exhibits no tenderness  Thoracic back: He exhibits no tenderness  Lumbar back: He exhibits no tenderness  Skin:     General: Skin is warm and dry  Neurological:      Mental Status: He is alert and oriented to person, place, and time  Coordination: Finger-Nose-Finger Test normal       Gait: Gait is intact        Deep Tendon Reflexes: Strength normal       Reflex Scores:       Tricep reflexes are 2+ on the right side and 2+ on the left side  Bicep reflexes are 2+ on the right side and 2+ on the left side  Brachioradialis reflexes are 2+ on the right side and 2+ on the left side  Patellar reflexes are 2+ on the right side and 2+ on the left side  Achilles reflexes are 2+ on the right side and 2+ on the left side  Psychiatric:         Attention and Perception: Attention and perception normal          Mood and Affect: Mood and affect normal          Speech: Speech normal          Behavior: Behavior normal  Behavior is cooperative  Thought Content: Thought content normal          Cognition and Memory: Cognition and memory normal          Judgment: Judgment normal          Neurologic Exam     Mental Status   Oriented to person, place, and time  Follows 2 step commands  Attention: normal  Concentration: normal    Speech: speech is normal   Level of consciousness: alert  Knowledge: good  Normal comprehension  Cranial Nerves     CN III, IV, VI   Pupils are equal, round, and reactive to light  CN III: right CN III palsy  CN VI: no CN VI palsy  Nystagmus: none   Diplopia: none  Conjugate gaze: present    CN V   Facial sensation intact  CN VII   Facial expression full, symmetric  CN VIII   CN VIII normal    Hearing: intact    CN IX, X   CN IX normal      CN XI   CN XI normal      CN XII   CN XII normal      Motor Exam   Muscle bulk: normal  Overall muscle tone: normal  Right arm pronator drift: absent  Left arm pronator drift: absent    Strength   Strength 5/5 throughout       Sensory Exam   Light touch normal    Proprioception normal    JPS and DST intact     Gait, Coordination, and Reflexes     Gait  Gait: normal    Coordination   Finger to nose coordination: normal    Tremor   Resting tremor: absent  Intention tremor: absent  Action tremor: absent    Reflexes   Right brachioradialis: 2+  Left brachioradialis: 2+  Right biceps: 2+  Left biceps: 2+  Right triceps: 2+  Left triceps: 2+  Right patellar: 2+  Left patellar: 2+  Right achilles: 2+  Left achilles: 2+  Right [place]: absent  Left [place]: absent  Right ankle clonus: absent  Left ankle clonus: absent        MEDICAL DECISION MAKING    Imaging Studies:     Mri Brain With And Without Contrast    Result Date: 9/14/2020  Narrative: MRI BRAIN WITH AND WITHOUT CONTRAST INDICATION: R90 89: Other abnormal findings on diagnostic imaging of central nervous system D49 6: Neoplasm of unspecified behavior of brain D43 2: Neoplasm of uncertain behavior of brain, unspecified  COMPARISON:  February 17, 2020 TECHNIQUE: Sagittal T1, axial T2, axial FLAIR, axial T1, axial Gradient, axial diffusion  Sagittal, axial T1 postcontrast   Axial bravo postcontrast with coronal reconstructions  IV Contrast:  7 mL of Gadobutrol injection (SINGLE-DOSE)  IMAGE QUALITY:   Diagnostic  FINDINGS: BRAIN PARENCHYMA:  Stable size of heterogeneously enhancing mass interposed between the dorsal medulla and ventral cerebellum inferior to the 4th ventricle measuring approximately 11 mm in maximum transverse dimension compatible with subependymoma  No hydrocephalus identified  No additional masses are seen  Small scattered hyperintensities on T2/FLAIR imaging are noted in the periventricular and subcortical white matter demonstrating an appearance that is statistically most likely to represent mild microangiopathic change  Postcontrast imaging of the brain demonstrates no abnormal enhancement  VENTRICLES:  Normal for the patient's age  SELLA AND PITUITARY GLAND:  Normal  ORBITS:  Normal  PARANASAL SINUSES:  Normal  VASCULATURE:  Evaluation of the major intracranial vasculature demonstrates appropriate flow voids   CALVARIUM AND SKULL BASE:  Normal  EXTRACRANIAL SOFT TISSUES:  Normal      Impression: Stable sized posterior fossa mass interposed between the dorsal medulla and anterior to the cerebellar tonsils compatible with subependymoma  The mass is heterogeneously enhancing as on the prior measuring approximately 11 mm in maximum dimension  Workstation performed: [id number]       I have personally reviewed pertinent reports     and I have personally reviewed pertinent films in PACS

## 2020-09-14 NOTE — ASSESSMENT & PLAN NOTE
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  · Patient's initial MRI brain on 08/29/2019 showed mass 13-14 mm in size located between the dorsal mandibular and cerebellum from the caudal 4th ventricle  Patient then had a follow-up MRI in 3 months on 02/17/2020 which revealed a unchanged mass compared to prior imaging  Patient recently had another MRI brain for follow-up 6 months later on 9/11/20 which revealed stable sized posterior fossa mass interposed between the dorsal medulla and anterior to the cerebellar tonsils compatible with subependymoma  The mass is  measuring approximately 11 mm in maximum dimension  Once again there is no evidence of hydrocephalus or ventriculomegaly, 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  · Patient had recent ED visit on 9/7/20 for new onset of right eye droop with visual changes  No findings during ER stay but patient earlier today followed up with his ophthalmologist outpatient which stated 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  Likely secondary to type 2 diabetes  Hg A1C in June 2020 was 6 3  Imaging:    MRI brain 09/11/2020:Stable sized posterior fossa mass interposed between the dorsal medulla and anterior to the cerebellar tonsils compatible with subependymoma  The mass is heterogeneously enhancing as on the prior measuring approximately 11 mm in maximum dimension  Plan:  · Reviewed imaging with patient and daughter  · Patient and daughter aware of worsening symptoms of hydrocephalus and brain compression    Signs and symptoms include headaches, nausea/vomiting, seizures, or balance difficulties to go to ED for further evaluation  · Patient will follow-up in 1 year with repeat MRI brain w/wo for routine surveillance  · Reassured patient and daughter at his new right eyelid droop is not related to his subependymoma  · Recommend patient follow up with his ophthalmologist to be cleared from driving standpoint  · Educated patient if his double vision gets worse he may place an eye patch on his right eye to help with his vision  · At this time, no neurosurgical intervention is anticipated  · Discussed recommendations with patient  Patient showed understanding and was agreeable to recommendations  · Patient made aware to contact Neurosurgery with any questions or concerns

## 2020-09-16 ENCOUNTER — TELEPHONE (OUTPATIENT)
Dept: NEUROSURGERY | Facility: CLINIC | Age: 70
End: 2020-09-16

## 2020-09-16 DIAGNOSIS — I42.0 DILATED CARDIOMYOPATHY (HCC): ICD-10-CM

## 2020-09-16 DIAGNOSIS — M47.816 SPONDYLOSIS OF LUMBAR REGION WITHOUT MYELOPATHY OR RADICULOPATHY: ICD-10-CM

## 2020-09-16 DIAGNOSIS — E11.41 TYPE 2 DIABETES MELLITUS WITH DIABETIC MONONEUROPATHY, WITHOUT LONG-TERM CURRENT USE OF INSULIN (HCC): ICD-10-CM

## 2020-09-16 DIAGNOSIS — Z13.5 SCREENING FOR DIABETIC RETINOPATHY: ICD-10-CM

## 2020-09-16 DIAGNOSIS — I10 BENIGN ESSENTIAL HYPERTENSION: ICD-10-CM

## 2020-09-16 RX ORDER — AMLODIPINE BESYLATE 10 MG/1
10 TABLET ORAL DAILY
Qty: 90 TABLET | Refills: 1 | Status: SHIPPED | OUTPATIENT
Start: 2020-09-16 | End: 2021-02-19 | Stop reason: SDUPTHER

## 2020-09-16 NOTE — TELEPHONE ENCOUNTER
04/06/2021-CALLED PT (WITH HELP FROM [employer]-[place]) AND OFFERED TO RESCHEDULED SNPX W/MARLIN F/U THAT WAS CANCELLED ON 03/10 [place] MRI NEEDED PRIOR  PT DOES NOT WISH TO SCHEDULE AT THIS TIME       03/26/2021-3/08 MRI AND 03/10 F/U APT WAS CX BY Patient (PT CANCELLED WCB TO RE-SCHED WHEN HE KNOWS WHEN HE WILL BE AVAILABLE )  CALLED PT AND LEFT MESSAGE ON MACHINE TO RESCHEDULE STUDY AND APT         09/25/2020-HERMINIA CALLED PT (IN [place]) AND CONFIRMED 03/08/2021 MRI BRAIN APT AND 03/10/2021 F/U APT WITH PT   WILL DO PRE-AUTH CLOSER TO DATE OF MRI        09/16/2020-PER EUSEBIO, RESCHEDULE 1 YEAR F/U TO 6 MONTH F/U AND RESCHEDULE MRI FOR 6 MONTHS INSTEAD OF A YEAR  CALLED PT, PT DOES NOT SPEAK ENGLISH, ONLY [place]  PASCALE CALLED PT BACK AND ADVISED HIM OF MOVING MRI AND F/U UP TO 6 MONTHS INSTEAD OF A YEAR, PT UNDERSTOOD  RESCHEDULED MRI TO 03/08/2021, AND F/U APT TO 03/10/2021      NEED TO CALL PT W/ TO CONFIRM APT'S

## 2020-10-09 ENCOUNTER — LAB (OUTPATIENT)
Dept: LAB | Facility: HOSPITAL | Age: 70
End: 2020-10-09
Attending: INTERNAL MEDICINE
Payer: COMMERCIAL

## 2020-10-09 DIAGNOSIS — E11.69 ONYCHOMYCOSIS OF MULTIPLE TOENAILS WITH TYPE 2 DIABETES MELLITUS (HCC): ICD-10-CM

## 2020-10-09 DIAGNOSIS — B35.1 ONYCHOMYCOSIS OF MULTIPLE TOENAILS WITH TYPE 2 DIABETES MELLITUS (HCC): ICD-10-CM

## 2020-10-09 DIAGNOSIS — E11.41 TYPE 2 DIABETES MELLITUS WITH DIABETIC MONONEUROPATHY, WITHOUT LONG-TERM CURRENT USE OF INSULIN (HCC): ICD-10-CM

## 2020-10-09 LAB
ALBUMIN SERPL BCP-MCNC: 3.9 G/DL (ref 3.5–5)
ALP SERPL-CCNC: 55 U/L (ref 46–116)
ALT SERPL W P-5'-P-CCNC: 24 U/L (ref 12–78)
ANION GAP SERPL CALCULATED.3IONS-SCNC: 5 MMOL/L (ref 4–13)
AST SERPL W P-5'-P-CCNC: 14 U/L (ref 5–45)
BILIRUB SERPL-MCNC: 0.55 MG/DL (ref 0.2–1)
BUN SERPL-MCNC: 23 MG/DL (ref 5–25)
CALCIUM SERPL-MCNC: 9.1 MG/DL (ref 8.3–10.1)
CHLORIDE SERPL-SCNC: 104 MMOL/L (ref 100–108)
CO2 SERPL-SCNC: 26 MMOL/L (ref 21–32)
CREAT SERPL-MCNC: 0.97 MG/DL (ref 0.6–1.3)
ERYTHROCYTE [DISTWIDTH] IN BLOOD BY AUTOMATED COUNT: 13.6 % (ref 11.6–15.1)
EST. AVERAGE GLUCOSE BLD GHB EST-MCNC: 131 MG/DL
GFR SERPL CREATININE-BSD FRML MDRD: 79 ML/MIN/1.73SQ M
GLUCOSE P FAST SERPL-MCNC: 123 MG/DL (ref 65–99)
HBA1C MFR BLD: 6.2 %
HCT VFR BLD AUTO: 43.5 % (ref 36.5–49.3)
HGB BLD-MCNC: 14.1 G/DL (ref 12–17)
MCH RBC QN AUTO: 29.7 PG (ref 26.8–34.3)
MCHC RBC AUTO-ENTMCNC: 32.4 G/DL (ref 31.4–37.4)
MCV RBC AUTO: 92 FL (ref 82–98)
PLATELET # BLD AUTO: 204 THOUSANDS/UL (ref 149–390)
PMV BLD AUTO: 9.6 FL (ref 8.9–12.7)
POTASSIUM SERPL-SCNC: 4.2 MMOL/L (ref 3.5–5.3)
PROT SERPL-MCNC: 7.6 G/DL (ref 6.4–8.2)
RBC # BLD AUTO: 4.74 MILLION/UL (ref 3.88–5.62)
SODIUM SERPL-SCNC: 135 MMOL/L (ref 136–145)
WBC # BLD AUTO: 7.9 THOUSAND/UL (ref 4.31–10.16)

## 2020-10-09 PROCEDURE — 85027 COMPLETE CBC AUTOMATED: CPT

## 2020-10-09 PROCEDURE — 80053 COMPREHEN METABOLIC PANEL: CPT

## 2020-10-09 PROCEDURE — 36415 COLL VENOUS BLD VENIPUNCTURE: CPT

## 2020-10-09 PROCEDURE — 83036 HEMOGLOBIN GLYCOSYLATED A1C: CPT

## 2020-10-12 ENCOUNTER — OFFICE VISIT (OUTPATIENT)
Dept: INTERNAL MEDICINE CLINIC | Facility: CLINIC | Age: 70
End: 2020-10-12
Payer: COMMERCIAL

## 2020-10-12 VITALS
SYSTOLIC BLOOD PRESSURE: 102 MMHG | BODY MASS INDEX: 30.58 KG/M2 | HEART RATE: 77 BPM | HEIGHT: 63 IN | WEIGHT: 172.6 LBS | DIASTOLIC BLOOD PRESSURE: 60 MMHG | OXYGEN SATURATION: 98 % | TEMPERATURE: 97.7 F

## 2020-10-12 DIAGNOSIS — E11.41 TYPE 2 DIABETES MELLITUS WITH DIABETIC MONONEUROPATHY, WITHOUT LONG-TERM CURRENT USE OF INSULIN (HCC): Primary | ICD-10-CM

## 2020-10-12 DIAGNOSIS — I42.0 DILATED CARDIOMYOPATHY (HCC): ICD-10-CM

## 2020-10-12 DIAGNOSIS — Z23 NEED FOR INFLUENZA VACCINATION: ICD-10-CM

## 2020-10-12 DIAGNOSIS — M47.816 SPONDYLOSIS OF LUMBAR REGION WITHOUT MYELOPATHY OR RADICULOPATHY: ICD-10-CM

## 2020-10-12 DIAGNOSIS — I10 BENIGN ESSENTIAL HYPERTENSION: ICD-10-CM

## 2020-10-12 DIAGNOSIS — E11.69 ONYCHOMYCOSIS OF MULTIPLE TOENAILS WITH TYPE 2 DIABETES MELLITUS (HCC): ICD-10-CM

## 2020-10-12 DIAGNOSIS — D43.2 SUBEPENDYMOMA (HCC): ICD-10-CM

## 2020-10-12 DIAGNOSIS — Z13.5 SCREENING FOR DIABETIC RETINOPATHY: ICD-10-CM

## 2020-10-12 DIAGNOSIS — B35.1 ONYCHOMYCOSIS OF MULTIPLE TOENAILS WITH TYPE 2 DIABETES MELLITUS (HCC): ICD-10-CM

## 2020-10-12 DIAGNOSIS — I10 ESSENTIAL HYPERTENSION: ICD-10-CM

## 2020-10-12 PROBLEM — R90.89 ABNORMAL FINDING ON MRI OF BRAIN: Status: RESOLVED | Noted: 2020-02-26 | Resolved: 2020-10-12

## 2020-10-12 PROBLEM — D49.6 BRAIN TUMOR (HCC): Status: RESOLVED | Noted: 2020-02-26 | Resolved: 2020-10-12

## 2020-10-12 PROCEDURE — G0008 ADMIN INFLUENZA VIRUS VAC: HCPCS

## 2020-10-12 PROCEDURE — 99214 OFFICE O/P EST MOD 30 MIN: CPT | Performed by: INTERNAL MEDICINE

## 2020-10-12 PROCEDURE — 90662 IIV NO PRSV INCREASED AG IM: CPT

## 2020-10-12 RX ORDER — CANDESARTAN 32 MG/1
32 TABLET ORAL DAILY
Qty: 90 TABLET | Refills: 1 | Status: SHIPPED | OUTPATIENT
Start: 2020-10-12 | End: 2021-02-19 | Stop reason: SDUPTHER

## 2020-10-12 RX ORDER — TERBINAFINE HYDROCHLORIDE 250 MG/1
250 TABLET ORAL DAILY
Qty: 28 TABLET | Refills: 2 | Status: SHIPPED | OUTPATIENT
Start: 2020-10-12 | End: 2021-01-04

## 2020-11-05 DIAGNOSIS — E78.00 HYPERCHOLESTEROLEMIA: ICD-10-CM

## 2020-11-05 RX ORDER — ROSUVASTATIN CALCIUM 20 MG/1
20 TABLET, COATED ORAL DAILY
Qty: 60 TABLET | Refills: 0 | Status: SHIPPED | OUTPATIENT
Start: 2020-11-05 | End: 2020-11-05

## 2020-11-05 RX ORDER — ROSUVASTATIN CALCIUM 20 MG/1
TABLET, COATED ORAL
Qty: 90 TABLET | Refills: 1 | Status: SHIPPED | OUTPATIENT
Start: 2020-11-05 | End: 2021-04-30

## 2021-01-12 LAB
LEFT EYE DIABETIC RETINOPATHY: NORMAL
RIGHT EYE DIABETIC RETINOPATHY: NORMAL

## 2021-01-19 ENCOUNTER — IMMUNIZATIONS (OUTPATIENT)
Dept: FAMILY MEDICINE CLINIC | Facility: HOSPITAL | Age: 71
End: 2021-01-19

## 2021-01-19 DIAGNOSIS — Z23 ENCOUNTER FOR IMMUNIZATION: Primary | ICD-10-CM

## 2021-01-19 PROCEDURE — 0011A SARS-COV-2 / COVID-19 MRNA VACCINE (MODERNA) 100 MCG: CPT

## 2021-01-19 PROCEDURE — 91301 SARS-COV-2 / COVID-19 MRNA VACCINE (MODERNA) 100 MCG: CPT

## 2021-01-29 DIAGNOSIS — N20.0 NEPHROLITHIASIS: ICD-10-CM

## 2021-01-29 DIAGNOSIS — E11.9 TYPE 2 DIABETES MELLITUS WITHOUT COMPLICATION, WITHOUT LONG-TERM CURRENT USE OF INSULIN (HCC): ICD-10-CM

## 2021-01-29 DIAGNOSIS — N39.41 URGE INCONTINENCE: ICD-10-CM

## 2021-01-29 RX ORDER — OXYBUTYNIN CHLORIDE 10 MG/1
TABLET, EXTENDED RELEASE ORAL
Qty: 90 TABLET | Refills: 1 | Status: SHIPPED | OUTPATIENT
Start: 2021-01-29 | End: 2021-09-24 | Stop reason: SDUPTHER

## 2021-01-29 RX ORDER — TAMSULOSIN HYDROCHLORIDE 0.4 MG/1
CAPSULE ORAL
Qty: 90 CAPSULE | Refills: 1 | Status: SHIPPED | OUTPATIENT
Start: 2021-01-29 | End: 2021-12-14 | Stop reason: SDUPTHER

## 2021-02-14 DIAGNOSIS — K21.9 CHRONIC GERD: ICD-10-CM

## 2021-02-14 RX ORDER — OMEPRAZOLE 40 MG/1
CAPSULE, DELAYED RELEASE ORAL
Qty: 90 CAPSULE | Refills: 1 | Status: SHIPPED | OUTPATIENT
Start: 2021-02-14 | End: 2021-06-25 | Stop reason: SDUPTHER

## 2021-02-15 ENCOUNTER — IMMUNIZATIONS (OUTPATIENT)
Dept: FAMILY MEDICINE CLINIC | Facility: HOSPITAL | Age: 71
End: 2021-02-15

## 2021-02-15 DIAGNOSIS — Z23 ENCOUNTER FOR IMMUNIZATION: Primary | ICD-10-CM

## 2021-02-15 PROCEDURE — 91301 SARS-COV-2 / COVID-19 MRNA VACCINE (MODERNA) 100 MCG: CPT

## 2021-02-15 PROCEDURE — 0012A SARS-COV-2 / COVID-19 MRNA VACCINE (MODERNA) 100 MCG: CPT

## 2021-02-17 ENCOUNTER — LAB (OUTPATIENT)
Dept: LAB | Facility: HOSPITAL | Age: 71
End: 2021-02-17
Attending: INTERNAL MEDICINE
Payer: COMMERCIAL

## 2021-02-17 DIAGNOSIS — D43.2 SUBEPENDYMOMA (HCC): ICD-10-CM

## 2021-02-17 DIAGNOSIS — E11.69 ONYCHOMYCOSIS OF MULTIPLE TOENAILS WITH TYPE 2 DIABETES MELLITUS (HCC): ICD-10-CM

## 2021-02-17 DIAGNOSIS — E11.41 TYPE 2 DIABETES MELLITUS WITH DIABETIC MONONEUROPATHY, WITHOUT LONG-TERM CURRENT USE OF INSULIN (HCC): ICD-10-CM

## 2021-02-17 DIAGNOSIS — I10 ESSENTIAL HYPERTENSION: ICD-10-CM

## 2021-02-17 DIAGNOSIS — I10 BENIGN ESSENTIAL HYPERTENSION: ICD-10-CM

## 2021-02-17 DIAGNOSIS — I42.0 DILATED CARDIOMYOPATHY (HCC): ICD-10-CM

## 2021-02-17 DIAGNOSIS — B35.1 ONYCHOMYCOSIS OF MULTIPLE TOENAILS WITH TYPE 2 DIABETES MELLITUS (HCC): ICD-10-CM

## 2021-02-17 LAB
ALBUMIN SERPL BCP-MCNC: 3.9 G/DL (ref 3.5–5)
ALP SERPL-CCNC: 63 U/L (ref 46–116)
ALT SERPL W P-5'-P-CCNC: 29 U/L (ref 12–78)
ANION GAP SERPL CALCULATED.3IONS-SCNC: 7 MMOL/L (ref 4–13)
AST SERPL W P-5'-P-CCNC: 16 U/L (ref 5–45)
BILIRUB SERPL-MCNC: 0.42 MG/DL (ref 0.2–1)
BUN SERPL-MCNC: 26 MG/DL (ref 5–25)
CALCIUM SERPL-MCNC: 9.4 MG/DL (ref 8.3–10.1)
CHLORIDE SERPL-SCNC: 103 MMOL/L (ref 100–108)
CO2 SERPL-SCNC: 28 MMOL/L (ref 21–32)
CREAT SERPL-MCNC: 0.9 MG/DL (ref 0.6–1.3)
CREAT UR-MCNC: 107 MG/DL
ERYTHROCYTE [DISTWIDTH] IN BLOOD BY AUTOMATED COUNT: 13.7 % (ref 11.6–15.1)
EST. AVERAGE GLUCOSE BLD GHB EST-MCNC: 120 MG/DL
GFR SERPL CREATININE-BSD FRML MDRD: 86 ML/MIN/1.73SQ M
GLUCOSE P FAST SERPL-MCNC: 130 MG/DL (ref 65–99)
HBA1C MFR BLD: 5.8 %
HCT VFR BLD AUTO: 42.9 % (ref 36.5–49.3)
HGB BLD-MCNC: 13.9 G/DL (ref 12–17)
MCH RBC QN AUTO: 29.8 PG (ref 26.8–34.3)
MCHC RBC AUTO-ENTMCNC: 32.4 G/DL (ref 31.4–37.4)
MCV RBC AUTO: 92 FL (ref 82–98)
MICROALBUMIN UR-MCNC: 8.9 MG/L (ref 0–20)
MICROALBUMIN/CREAT 24H UR: 8 MG/G CREATININE (ref 0–30)
PLATELET # BLD AUTO: 211 THOUSANDS/UL (ref 149–390)
PMV BLD AUTO: 9.4 FL (ref 8.9–12.7)
POTASSIUM SERPL-SCNC: 4.4 MMOL/L (ref 3.5–5.3)
PROT SERPL-MCNC: 7.6 G/DL (ref 6.4–8.2)
RBC # BLD AUTO: 4.66 MILLION/UL (ref 3.88–5.62)
SODIUM SERPL-SCNC: 138 MMOL/L (ref 136–145)
WBC # BLD AUTO: 8.81 THOUSAND/UL (ref 4.31–10.16)

## 2021-02-17 PROCEDURE — 83036 HEMOGLOBIN GLYCOSYLATED A1C: CPT

## 2021-02-17 PROCEDURE — 36415 COLL VENOUS BLD VENIPUNCTURE: CPT

## 2021-02-17 PROCEDURE — 80053 COMPREHEN METABOLIC PANEL: CPT

## 2021-02-17 PROCEDURE — 82570 ASSAY OF URINE CREATININE: CPT

## 2021-02-17 PROCEDURE — 85027 COMPLETE CBC AUTOMATED: CPT

## 2021-02-17 PROCEDURE — 82043 UR ALBUMIN QUANTITATIVE: CPT

## 2021-02-19 ENCOUNTER — OFFICE VISIT (OUTPATIENT)
Dept: INTERNAL MEDICINE CLINIC | Facility: CLINIC | Age: 71
End: 2021-02-19
Payer: COMMERCIAL

## 2021-02-19 VITALS
OXYGEN SATURATION: 98 % | WEIGHT: 169.2 LBS | SYSTOLIC BLOOD PRESSURE: 118 MMHG | TEMPERATURE: 98.2 F | BODY MASS INDEX: 31.14 KG/M2 | HEIGHT: 62 IN | HEART RATE: 86 BPM | DIASTOLIC BLOOD PRESSURE: 72 MMHG

## 2021-02-19 DIAGNOSIS — M47.816 SPONDYLOSIS OF LUMBAR REGION WITHOUT MYELOPATHY OR RADICULOPATHY: ICD-10-CM

## 2021-02-19 DIAGNOSIS — I10 BENIGN ESSENTIAL HYPERTENSION: ICD-10-CM

## 2021-02-19 DIAGNOSIS — I10 ESSENTIAL HYPERTENSION: ICD-10-CM

## 2021-02-19 DIAGNOSIS — I42.0 DILATED CARDIOMYOPATHY (HCC): ICD-10-CM

## 2021-02-19 DIAGNOSIS — E11.41 TYPE 2 DIABETES MELLITUS WITH DIABETIC MONONEUROPATHY, WITHOUT LONG-TERM CURRENT USE OF INSULIN (HCC): ICD-10-CM

## 2021-02-19 DIAGNOSIS — E78.2 MIXED HYPERLIPIDEMIA: ICD-10-CM

## 2021-02-19 DIAGNOSIS — N20.0 NEPHROLITHIASIS: ICD-10-CM

## 2021-02-19 DIAGNOSIS — E11.41 TYPE 2 DIABETES MELLITUS WITH DIABETIC MONONEUROPATHY, WITHOUT LONG-TERM CURRENT USE OF INSULIN (HCC): Primary | ICD-10-CM

## 2021-02-19 DIAGNOSIS — M25.512 ACUTE PAIN OF LEFT SHOULDER: ICD-10-CM

## 2021-02-19 DIAGNOSIS — Z13.5 SCREENING FOR DIABETIC RETINOPATHY: ICD-10-CM

## 2021-02-19 PROCEDURE — 99213 OFFICE O/P EST LOW 20 MIN: CPT | Performed by: INTERNAL MEDICINE

## 2021-02-19 RX ORDER — IBUPROFEN 600 MG/1
600 TABLET ORAL EVERY 8 HOURS PRN
Qty: 90 TABLET | Refills: 2 | Status: SHIPPED | OUTPATIENT
Start: 2021-02-19 | End: 2021-06-01

## 2021-02-19 RX ORDER — CANDESARTAN 32 MG/1
32 TABLET ORAL DAILY
Qty: 90 TABLET | Refills: 1 | Status: SHIPPED | OUTPATIENT
Start: 2021-02-19 | End: 2021-06-25 | Stop reason: SDUPTHER

## 2021-02-19 RX ORDER — AMLODIPINE BESYLATE 10 MG/1
10 TABLET ORAL DAILY
Qty: 90 TABLET | Refills: 1 | Status: SHIPPED | OUTPATIENT
Start: 2021-02-19 | End: 2021-06-25 | Stop reason: SDUPTHER

## 2021-02-19 NOTE — ASSESSMENT & PLAN NOTE
Currently complaining of recurring passage of stones with pain  1 episode about every 2 months  No family history  Will get renal ultrasound

## 2021-02-19 NOTE — ASSESSMENT & PLAN NOTE
Lab Results   Component Value Date    HGBA1C 5 8 (H) 02/17/2021     Diabetic foot exam adequate sensation, pulse, and capillary refill   Hba1c in good Range

## 2021-02-19 NOTE — PROGRESS NOTES
Assessment/Plan:    Type 2 diabetes mellitus with diabetic mononeuropathy, without long-term current use of insulin ([facility])    Lab Results   Component Value Date    HGBA1C 5 8 (H) 02/17/2021     Diabetic foot exam adequate sensation, pulse, and capillary refill   Hba1c in good Range  Nephrolithiasis  Currently complaining of recurring passage of stones with pain  1 episode about every 2 months  No family history  Will get renal ultrasound  Diagnoses and all orders for this visit:    Type 2 diabetes mellitus with diabetic mononeuropathy, without long-term current use of insulin ([facility])    Benign essential hypertension    Nephrolithiasis          Subjective:      Patient ID: Carmita Penaloza is a 79 y o  male  Comes for regular follow up visit  Complains of recurrent painful renal stones which pass in the urine once every 2 months aprox,  Also refers having CN 3 palsy in June of 2020 that lasted for about 2 months  Was evaluated by ophthalmology at the time  The following portions of the patient's history were reviewed and updated as appropriate: past social history and problem list     Review of Systems   Constitutional: Negative for chills, fatigue and fever  HENT: Positive for hearing loss  Eyes: Negative  Respiratory: Negative  Negative for cough, choking, chest tightness and shortness of breath  Cardiovascular: Negative for chest pain, palpitations and leg swelling  Gastrointestinal: Negative  Negative for blood in stool  Endocrine: Negative  Genitourinary: Positive for penile pain  Musculoskeletal: Negative  Skin: Negative  Allergic/Immunologic: Negative  Neurological: Negative for dizziness and light-headedness  Hematological: Negative  Psychiatric/Behavioral: Negative            Objective:    /72 (BP Location: Left arm, Patient Position: Sitting, Cuff Size: Standard)   Pulse 86   Temp 98 2 °F (36 8 °C) (Tympanic)   Ht 5' 2 21" (1 58 m)   Wt 76 7 kg (169 lb 3 2 oz)   SpO2 98%   BMI 30 74 kg/m²          Physical Exam  Constitutional:       Appearance: He is obese  HENT:      Head: Normocephalic  Nose: Nose normal       Mouth/Throat:      Mouth: Mucous membranes are moist    Eyes:      Extraocular Movements: Extraocular movements intact  Pupils: Pupils are equal, round, and reactive to light  Neck:      Musculoskeletal: Normal range of motion and neck supple  Cardiovascular:      Rate and Rhythm: Normal rate and regular rhythm  Pulses: Normal pulses  no weak pulses          Dorsalis pedis pulses are 2+ on the right side and 2+ on the left side  Posterior tibial pulses are 2+ on the right side and 2+ on the left side  Heart sounds: Normal heart sounds  Pulmonary:      Effort: Pulmonary effort is normal       Breath sounds: Normal breath sounds  Abdominal:      General: Abdomen is flat  There is no distension  Palpations: Abdomen is soft  There is no mass  Tenderness: There is no abdominal tenderness  There is no right CVA tenderness, left CVA tenderness or guarding  Musculoskeletal: Normal range of motion  General: No swelling  Feet:      Right foot:      Skin integrity: No ulcer, skin breakdown, erythema, warmth, callus or dry skin  Left foot:      Skin integrity: No ulcer, skin breakdown, erythema, warmth, callus or dry skin  Skin:     General: Skin is warm  Capillary Refill: Capillary refill takes less than 2 seconds  Neurological:      General: No focal deficit present  Mental Status: He is alert and oriented to person, place, and time  Psychiatric:         Mood and Affect: Mood normal          Behavior: Behavior normal            Patient's shoes and socks removed  Right Foot/Ankle   Right Foot Inspection  Skin Exam: skin normal and skin intact no dry skin, no warmth, no callus, no erythema, no maceration, no abnormal color, no pre-ulcer, no ulcer and no callus Toe Exam: ROM and strength within normal limits  Sensory   Vibration: intact  Proprioception: intact   Monofilament testing: intact  Vascular  Capillary refills: < 3 seconds  The right DP pulse is 2+  The right PT pulse is 2+  Left Foot/Ankle  Left Foot Inspection  Skin Exam: skin normal and skin intactno dry skin, no warmth, no erythema, no maceration, normal color, no pre-ulcer, no ulcer and no callus                         Toe Exam: ROM and strength within normal limits                   Sensory   Vibration: intact  Proprioception: intact  Monofilament: intact  Vascular  Capillary refills: < 3 seconds  The left DP pulse is 2+  The left PT pulse is 2+  Assign Risk Category:  No deformity present; No loss of protective sensation;  No weak pulses       Risk: 0

## 2021-02-24 ENCOUNTER — LAB (OUTPATIENT)
Dept: LAB | Facility: HOSPITAL | Age: 71
End: 2021-02-24
Payer: COMMERCIAL

## 2021-02-24 ENCOUNTER — HOSPITAL ENCOUNTER (OUTPATIENT)
Dept: ULTRASOUND IMAGING | Facility: HOSPITAL | Age: 71
Discharge: HOME | End: 2021-02-24
Payer: COMMERCIAL

## 2021-02-24 DIAGNOSIS — N20.0 NEPHROLITHIASIS: ICD-10-CM

## 2021-02-24 LAB
BILIRUB UR QL STRIP: NEGATIVE
CLARITY UR: CLEAR
COLOR UR: YELLOW
GLUCOSE UR STRIP-MCNC: NEGATIVE MG/DL
HGB UR QL STRIP.AUTO: NEGATIVE
KETONES UR STRIP-MCNC: NEGATIVE MG/DL
LEUKOCYTE ESTERASE UR QL STRIP: NEGATIVE
NITRITE UR QL STRIP: NEGATIVE
PH UR STRIP.AUTO: 5 [PH]
PROT UR STRIP-MCNC: NEGATIVE MG/DL
SP GR UR STRIP.AUTO: 1.02 (ref 1–1.03)
UROBILINOGEN UR QL STRIP.AUTO: 0.2 E.U./DL

## 2021-02-24 PROCEDURE — 81003 URINALYSIS AUTO W/O SCOPE: CPT | Performed by: INTERNAL MEDICINE

## 2021-02-24 PROCEDURE — 76770 US EXAM ABDO BACK WALL COMP: CPT

## 2021-04-30 DIAGNOSIS — E78.00 HYPERCHOLESTEROLEMIA: ICD-10-CM

## 2021-04-30 RX ORDER — ROSUVASTATIN CALCIUM 20 MG/1
TABLET, COATED ORAL
Qty: 90 TABLET | Refills: 1 | Status: SHIPPED | OUTPATIENT
Start: 2021-04-30 | End: 2021-06-25 | Stop reason: SDUPTHER

## 2021-05-29 DIAGNOSIS — M25.512 ACUTE PAIN OF LEFT SHOULDER: ICD-10-CM

## 2021-05-29 DIAGNOSIS — M47.816 SPONDYLOSIS OF LUMBAR REGION WITHOUT MYELOPATHY OR RADICULOPATHY: ICD-10-CM

## 2021-06-01 RX ORDER — IBUPROFEN 600 MG/1
TABLET ORAL
Qty: 90 TABLET | Refills: 2 | Status: SHIPPED | OUTPATIENT
Start: 2021-06-01 | End: 2021-10-29 | Stop reason: SDUPTHER

## 2021-06-18 ENCOUNTER — RA CDI HCC (OUTPATIENT)
Dept: OTHER | Facility: HOSPITAL | Age: 71
End: 2021-06-18

## 2021-06-18 NOTE — PROGRESS NOTES
[facility] [employer]  coding opportunities             Chart reviewed, (number of) suggestions sent to provider: 3   DX:  I42 0-Dilated cardiomyopathy  D43  2-Neoplasm of uncertain behavior of brain, unspecified  E11 69-Type 2 diabetes mellitus wi  [facility] [employer]  coding opportunities             Chart reviewed, (number of) suggestions sent to provider: 3            Number of suggestions actually used: 2      Number of suggestions NOT actually used: 1     Patients insurance company: [employer])     Visit status: Patient arrived for their scheduled appointment   dx not on bill: E11 69, B35 1  Provider never responded to [facility] [employer]  coding request     th other specified complication, J68  1-Tinea unguium--unsure on current status/complication             Patients insurance company: [employer] [place] [address] (Medicare Advantage and Commercial)

## 2021-06-25 ENCOUNTER — OFFICE VISIT (OUTPATIENT)
Dept: INTERNAL MEDICINE CLINIC | Facility: CLINIC | Age: 71
End: 2021-06-25
Payer: COMMERCIAL

## 2021-06-25 VITALS
BODY MASS INDEX: 31.1 KG/M2 | WEIGHT: 169 LBS | HEART RATE: 77 BPM | TEMPERATURE: 98 F | HEIGHT: 62 IN | SYSTOLIC BLOOD PRESSURE: 110 MMHG | DIASTOLIC BLOOD PRESSURE: 60 MMHG | OXYGEN SATURATION: 98 %

## 2021-06-25 DIAGNOSIS — E78.00 HYPERCHOLESTEROLEMIA: ICD-10-CM

## 2021-06-25 DIAGNOSIS — N40.1 BPH WITH OBSTRUCTION/LOWER URINARY TRACT SYMPTOMS: ICD-10-CM

## 2021-06-25 DIAGNOSIS — N13.8 BPH WITH OBSTRUCTION/LOWER URINARY TRACT SYMPTOMS: ICD-10-CM

## 2021-06-25 DIAGNOSIS — E11.41 TYPE 2 DIABETES MELLITUS WITH DIABETIC MONONEUROPATHY, WITHOUT LONG-TERM CURRENT USE OF INSULIN (HCC): ICD-10-CM

## 2021-06-25 DIAGNOSIS — Z00.00 MEDICARE ANNUAL WELLNESS VISIT, SUBSEQUENT: Primary | ICD-10-CM

## 2021-06-25 DIAGNOSIS — I10 ESSENTIAL HYPERTENSION: ICD-10-CM

## 2021-06-25 DIAGNOSIS — G47.00 INSOMNIA, UNSPECIFIED TYPE: ICD-10-CM

## 2021-06-25 DIAGNOSIS — I10 BENIGN ESSENTIAL HYPERTENSION: ICD-10-CM

## 2021-06-25 DIAGNOSIS — Z13.5 SCREENING FOR DIABETIC RETINOPATHY: ICD-10-CM

## 2021-06-25 DIAGNOSIS — I42.0 DILATED CARDIOMYOPATHY (HCC): ICD-10-CM

## 2021-06-25 DIAGNOSIS — D43.2 SUBEPENDYMOMA (HCC): ICD-10-CM

## 2021-06-25 DIAGNOSIS — E11.9 TYPE 2 DIABETES MELLITUS WITHOUT COMPLICATION, WITHOUT LONG-TERM CURRENT USE OF INSULIN (HCC): ICD-10-CM

## 2021-06-25 DIAGNOSIS — M47.816 SPONDYLOSIS OF LUMBAR REGION WITHOUT MYELOPATHY OR RADICULOPATHY: ICD-10-CM

## 2021-06-25 DIAGNOSIS — K21.9 CHRONIC GERD: ICD-10-CM

## 2021-06-25 LAB — SL AMB POCT HEMOGLOBIN AIC: 5.9 (ref ?–6.5)

## 2021-06-25 PROCEDURE — 83036 HEMOGLOBIN GLYCOSYLATED A1C: CPT | Performed by: INTERNAL MEDICINE

## 2021-06-25 PROCEDURE — 99214 OFFICE O/P EST MOD 30 MIN: CPT | Performed by: INTERNAL MEDICINE

## 2021-06-25 PROCEDURE — G0439 PPPS, SUBSEQ VISIT: HCPCS | Performed by: INTERNAL MEDICINE

## 2021-06-25 RX ORDER — AMLODIPINE BESYLATE 10 MG/1
10 TABLET ORAL DAILY
Qty: 90 TABLET | Refills: 1 | Status: SHIPPED | OUTPATIENT
Start: 2021-06-25 | End: 2021-10-29 | Stop reason: SDUPTHER

## 2021-06-25 RX ORDER — ROSUVASTATIN CALCIUM 20 MG/1
20 TABLET, COATED ORAL DAILY
Qty: 90 TABLET | Refills: 1 | Status: SHIPPED | OUTPATIENT
Start: 2021-06-25 | End: 2021-10-29 | Stop reason: SDUPTHER

## 2021-06-25 RX ORDER — OMEPRAZOLE 40 MG/1
40 CAPSULE, DELAYED RELEASE ORAL DAILY
Qty: 90 CAPSULE | Refills: 1 | Status: SHIPPED | OUTPATIENT
Start: 2021-06-25 | End: 2021-10-29 | Stop reason: SDUPTHER

## 2021-06-25 RX ORDER — CANDESARTAN 32 MG/1
32 TABLET ORAL DAILY
Qty: 90 TABLET | Refills: 1 | Status: SHIPPED | OUTPATIENT
Start: 2021-06-25 | End: 2021-10-29 | Stop reason: SDUPTHER

## 2021-06-25 RX ORDER — UREA 10 %
1 LOTION (ML) TOPICAL
Qty: 100 TABLET | Refills: 1 | Status: SHIPPED | OUTPATIENT
Start: 2021-06-25 | End: 2022-05-23 | Stop reason: ALTCHOICE

## 2021-06-25 RX ORDER — FINASTERIDE 5 MG/1
5 TABLET, FILM COATED ORAL DAILY
Qty: 90 TABLET | Refills: 1 | Status: SHIPPED | OUTPATIENT
Start: 2021-06-25 | End: 2021-10-29 | Stop reason: SDUPTHER

## 2021-06-25 NOTE — PROGRESS NOTES
Assessment/Plan:    Type 2 diabetes mellitus with diabetic mononeuropathy, without long-term current use of insulin ([facility])    Lab Results   Component Value Date    HGBA1C 5 9 06/25/2021     Diabetes is well controlled on diet therapy along with linagliptin and metformin    Osteoarthritis of lumbar spine   Mild low back pain  Patient uses ibuprofen on an as-needed basis not regularly    Dilated cardiomyopathy (HCC)   No complaints of shortness of breath, orthopnea, PND, palpitations, lightheadedness  Clinically stable    BPH with obstruction/lower urinary tract symptoms   Nocturia 2 to 3 times per night  No improvement with the use of tamsulosin  Patient would like to try an alternative medication  We will initiate finasteride 5 mg daily    Benign essential hypertension   Blood pressure is nicely controlled on current medications  Screening for diabetic retinopathy    Referred for ophthalmological examination    Medicare annual wellness visit, subsequent   Patient is up-to-date on age-appropriate screenings and vaccinations  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)    Due for follow-up MRI in September  Diagnoses and all orders for this visit:    Medicare annual wellness visit, subsequent    Benign essential hypertension  -     amLODIPine (NORVASC) 10 mg tablet; Take 1 tablet (10 mg total) by mouth daily    Screening for diabetic retinopathy  -     amLODIPine (NORVASC) 10 mg tablet; Take 1 tablet (10 mg total) by mouth daily    Type 2 diabetes mellitus with diabetic mononeuropathy, without long-term current use of insulin (HCC)  -     amLODIPine (NORVASC) 10 mg tablet; Take 1 tablet (10 mg total) by mouth daily  -     linaGLIPtin 5 MG TABS; Take 5 mg by mouth daily  -     CBC and Platelet; Future  -     Comprehensive metabolic panel; Future  -     Lipid panel; Future  -     HEMOGLOBIN A1C W/ EAG ESTIMATION; Future  -     Microalbumin / creatinine urine ratio;  Future  -     POCT hemoglobin A1c    Dilated cardiomyopathy ([facility] )  -     amLODIPine (NORVASC) 10 mg tablet; Take 1 tablet (10 mg total) by mouth daily  -     CBC and Platelet; Future  -     Comprehensive metabolic panel; Future  -     Lipid panel; Future  -     HEMOGLOBIN A1C W/ EAG ESTIMATION; Future  -     Microalbumin / creatinine urine ratio; Future    Spondylosis of lumbar region without myelopathy or radiculopathy  -     amLODIPine (NORVASC) 10 mg tablet; Take 1 tablet (10 mg total) by mouth daily    Chronic GERD  -     omeprazole (PriLOSEC) 40 MG capsule; Take 1 capsule (40 mg total) by mouth daily  -     CBC and Platelet; Future  -     Comprehensive metabolic panel; Future    Essential hypertension  -     candesartan (ATACAND) 32 MG tablet; Take 1 tablet (32 mg total) by mouth daily    Hypercholesterolemia  -     rosuvastatin (CRESTOR) 20 MG tablet; Take 1 tablet (20 mg total) by mouth daily  -     CBC and Platelet; Future  -     Comprehensive metabolic panel; Future  -     Lipid panel; Future    Type 2 diabetes mellitus without complication, without long-term current use of insulin (HCC)  -     metFORMIN (GLUCOPHAGE) 1000 MG tablet; Take 1 tablet (1,000 mg total) by mouth 2 (two) times a day with meals  -     CBC and Platelet; Future  -     Comprehensive metabolic panel; Future  -     Lipid panel; Future    BPH with obstruction/lower urinary tract symptoms  -     finasteride (PROSCAR) 5 mg tablet; Take 1 tablet (5 mg total) by mouth daily  -     PSA, Total Screen; Future    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[facility] )    Insomnia, unspecified type  -     melatonin 1 mg; Take 1 tablet (1 mg total) by mouth daily at bedtime          Subjective:      Patient ID: Angela Alba is a 70 y o  male      HPI    Family History   Problem Relation Age of Onset    Heart disease Mother     Hypertension Mother     Ulcers Father     Stomach cancer Father      Social History     Socioeconomic History    Marital status: /Civil Union     Spouse name: Not on file    Number of children: Not on file    Years of education: Not on file    Highest education level: Not on file   Occupational History    Not on file   Tobacco Use    Smoking status: Never Smoker    Smokeless tobacco: Never Used   Vaping Use    Vaping Use: Never used   Substance and Sexual Activity    Alcohol use: No    Drug use: No    Sexual activity: Not on file   Other Topics Concern    Not on file   Social History Narrative    Not on file     Social Determinants of Health     Financial Resource Strain:     Difficulty of Paying Living Expenses:    Food Insecurity:     Worried About Running Out of Food in the Last Year:     [address] in the Last Year:    Transportation Needs:     Lack of Transportation (Medical):      Lack of Transportation (Non-Medical):    Physical Activity:     Days of Exercise per Week:     Minutes of Exercise per Session:    Stress:     Feeling of Stress :    Social Connections:     Frequency of Communication with Friends and Family:     Frequency of Social Gatherings with Friends and Family:     Attends Sabianist Services:     Active Member of Clubs or Organizations:     Attends Club or Organization Meetings:     Marital Status:    Intimate Partner Violence:     Fear of Current or Ex-Partner:     Emotionally Abused:     Physically Abused:     Sexually Abused:      Past Medical History:   Diagnosis Date    Abnormal echocardiogram 9/15/2017    Abnormal finding on MRI of brain 2/26/2020    Arthritis     OSTEOARTHRITIS OF LUMBAR SPINE    [facility] tumor ([facility] ) 2/26/2020    Carpal tunnel syndrome     Diabetes mellitus ([facility] )     Elevated prostate specific antigen (PSA)     Erectile dysfunction     GERD (gastroesophageal reflux disease)     History of BPH     Hx of adenomatous colonic polyps     Hyperlipidemia     Hypertension     Hypertension        Current Outpatient Medications:     amLODIPine (NORVASC) 10 mg tablet, Take 1 tablet (10 mg total) by mouth daily, Disp: 90 tablet, Rfl: 1    candesartan (ATACAND) 32 MG tablet, Take 1 tablet (32 mg total) by mouth daily, Disp: 90 tablet, Rfl: 1    ibuprofen (MOTRIN) 600 mg tablet, TOME CATHY TABLETA POR VIA ORAL CADA OCHO HORAS CUANDO SEA NECESARIO DOLTIFFAINE BR[place]FIELD, Disp: 90 tablet, Rfl: 2    linaGLIPtin 5 MG TABS, Take 5 mg by mouth daily, Disp: 90 tablet, Rfl: 1    metFORMIN (GLUCOPHAGE) 1000 MG tablet, Take 1 tablet (1,000 mg total) by mouth 2 (two) times a day with meals, Disp: 180 tablet, Rfl: 1    omega-3-acid ethyl esters (LOVAZA) 1 g capsule, Take 2 g by mouth, Disp: , Rfl:     omeprazole (PriLOSEC) 40 MG capsule, Take 1 capsule (40 mg total) by mouth daily, Disp: 90 capsule, Rfl: 1    oxybutynin (DITROPAN-XL) 10 MG 24 hr tablet, TOME CATHY TABLETA TODOS LOS ESCOBAR, Disp: 90 tablet, Rfl: 1    rosuvastatin (CRESTOR) 20 MG tablet, Take 1 tablet (20 mg total) by mouth daily, Disp: 90 tablet, Rfl: 1    finasteride (PROSCAR) 5 mg tablet, Take 1 tablet (5 mg total) by mouth daily, Disp: 90 tablet, Rfl: 1    melatonin 1 mg, Take 1 tablet (1 mg total) by mouth daily at bedtime, Disp: 100 tablet, Rfl: 1    sildenafil (VIAGRA) 100 mg tablet, Take 1 tablet (100 mg total) by mouth daily as needed for erectile dysfunction (Patient not taking: Reported on 6/25/2021), Disp: 3 tablet, Rfl: 5    tamsulosin (FLOMAX) 0 4 mg, TOME CATHY CAPSULA TODOS LOS ESCOBAR WITH DINNER (Patient not taking: Reported on 6/25/2021), Disp: 90 capsule, Rfl: 1  No Known Allergies  Past Surgical History:   Procedure Laterality Date    APPENDECTOMY      CARPAL TUNNEL RELEASE      COLONOSCOPY      CYSTOSCOPY W/ DILATION OF BLADDER  01/09/2017    DR KILO LENZ, [facility]     EGD AND COLONOSCOPY N/A 2/8/2018    Procedure: EGD with biopsy AND COLONOSCOPY with polypectomy with hemo clip application;  Surgeon: Angella Clayton MD;  Location: [facility];   Service: Gastroenterology    NEUROPLASTY / TRANSPOSITION MEDIAN NERVE AT CARPAL TUNNEL      LEFT & RIGHT Review of Systems   Constitutional: Negative  HENT: Negative  Eyes: Negative  Respiratory: Negative  Cardiovascular: Negative  Gastrointestinal: Negative  Endocrine: Negative  Genitourinary: Negative  Musculoskeletal: Negative  Skin: Negative  Allergic/Immunologic: Negative  Neurological: Negative  Hematological: Negative  Psychiatric/Behavioral: Negative  Objective:      /60   Pulse 77   Temp 98 °F (36 7 °C) (Tympanic)   Ht 5' 2 4" (1 585 m)   Wt 76 7 kg (169 lb)   SpO2 98%   BMI 30 51 kg/m²  BMI Counseling: Body mass index is 30 51 kg/m²  The BMI is above normal  Nutrition recommendations include reducing portion sizes, decreasing overall calorie intake, 3-5 servings of fruits/vegetables daily, consuming healthier snacks, moderation in carbohydrate intake, increasing intake of lean protein, reducing intake of saturated fat and trans fat and reducing intake of cholesterol  Physical Exam  Vitals reviewed  Constitutional:       General: He is not in acute distress  Appearance: Normal appearance  He is not ill-appearing, toxic-appearing or diaphoretic  HENT:      Head: Normocephalic and atraumatic  Right Ear: Tympanic membrane, ear canal and external ear normal       Left Ear: Tympanic membrane, ear canal and external ear normal    Eyes:      General: No scleral icterus  Conjunctiva/sclera: Conjunctivae normal       Pupils: Pupils are equal, round, and reactive to light  Neck:      Vascular: No carotid bruit  Cardiovascular:      Rate and Rhythm: Normal rate and regular rhythm  Pulses: no weak pulses          Dorsalis pedis pulses are 1+ on the right side and 1+ on the left side  Posterior tibial pulses are 1+ on the right side and 1+ on the left side  Heart sounds: Normal heart sounds  Pulmonary:      Effort: Pulmonary effort is normal  No respiratory distress  Breath sounds: No wheezing or rales  Abdominal:      General: Abdomen is flat  Bowel sounds are normal  There is no distension  Tenderness: There is no abdominal tenderness  Musculoskeletal:      Cervical back: Neck supple  No rigidity  Right lower leg: No edema  Left lower leg: No edema  Feet:      Right foot:      Skin integrity: No ulcer, skin breakdown, erythema, warmth, callus or dry skin  Left foot:      Skin integrity: No ulcer, skin breakdown, erythema, warmth, callus or dry skin  Skin:     Capillary Refill: Capillary refill takes less than 2 seconds  Coloration: Skin is not jaundiced  Findings: No bruising, erythema or rash  Neurological:      General: No focal deficit present  Mental Status: He is alert and oriented to person, place, and time  Mental status is at baseline  Psychiatric:         Mood and Affect: Mood normal          Behavior: Behavior normal          Thought Content: Thought content normal          Judgment: Judgment normal        Right Foot/Ankle   Right Foot Inspection  Skin Exam: skin normal and skin intact no dry skin, no warmth, no callus, no erythema, no maceration, no abnormal color, no pre-ulcer, no ulcer and no callus                          Toe Exam: ROM and strength within normal limitsno swelling, no tenderness, erythema and  no right toe deformity  Sensory   Vibration: intact  Proprioception: intact   Monofilament testing: intact  Vascular  Capillary refills: < 3 seconds  The right DP pulse is 1+  The right PT pulse is 1+       Left Foot/Ankle  Left Foot Inspection  Skin Exam: skin normal and skin intactno dry skin, no warmth, no erythema, no maceration, normal color, no pre-ulcer, no ulcer and no callus                         Toe Exam: ROM and strength within normal limitsno swelling, no tenderness, no erythema and no left toe deformity                   Sensory   Vibration: intact  Proprioception: intact    Vascular  Capillary refills: < 3 seconds  The left DP pulse is 1+  The left PT pulse is 1+  Assign Risk Category:  No deformity present; No loss of protective sensation;  No weak pulses       Risk: 0

## 2021-06-25 NOTE — PROGRESS NOTES
Assessment and Plan:     Problem List Items Addressed This Visit     None           Preventive health issues were discussed with patient, and age appropriate screening tests were ordered as noted in patient's After Visit Summary  Personalized health advice and appropriate referrals for health education or preventive services given if needed, as noted in patient's After Visit Summary       History of Present Illness:     Patient presents for Medicare Annual Wellness visit    Patient Care Team:  Ernest Siemens, MD as PCP - Sacha Goldman MD as PCP - PCP-[facility]  MD Obed Geronimo MD as Endoscopist     Problem List:     Patient Active Problem List   Diagnosis    Vitamin D insufficiency    Type 2 diabetes mellitus with diabetic mononeuropathy, without long-term current use of insulin ([address] )    Polyp of colon    Overactive bladder    Osteoarthritis of lumbar spine    Hyperlipidemia    Grade II internal hemorrhoids    Dilated cardiomyopathy ([address] )    Chronic GERD    BPH with obstruction/lower urinary tract symptoms    Bilateral carpal tunnel syndrome    Benign essential hypertension    Erectile dysfunction    Screening for diabetic retinopathy    H  pylori infection    Tinea pedis of right foot    Primary osteoarthritis of left shoulder    Sensorineural hearing loss (SNHL) of left ear with unrestricted hearing of right ear    Medicare annual wellness visit, initial    Subacromial bursitis of left shoulder joint    Rotator cuff tendonitis, left    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[address] )    Nephrolithiasis    Onychomycosis of multiple toenails with type 2 diabetes mellitus ([address] )      Past Medical and Surgical History:     Past Medical History:   Diagnosis Date    Abnormal echocardiogram 9/15/2017    Abnormal finding on MRI of brain 2/26/2020    Arthritis     OSTEOARTHRITIS OF LUMBAR SPINE    Brain tumor ([address] ) 2/26/2020    Carpal tunnel syndrome     Diabetes mellitus ([facility]     Elevated prostate specific antigen (PSA)     Erectile dysfunction     GERD (gastroesophageal reflux disease)     History of BPH     Hx of adenomatous colonic polyps     Hyperlipidemia     Hypertension     Hypertension      Past Surgical History:   Procedure Laterality Date    APPENDECTOMY      CARPAL TUNNEL RELEASE      COLONOSCOPY      CYSTOSCOPY W/ DILATION OF BLADDER  01/09/2017    DR KILO LNEZ, [facility]     EGD AND COLONOSCOPY N/A 2/8/2018    Procedure: EGD with biopsy AND COLONOSCOPY with polypectomy with hemo clip application;  Surgeon: Concepcion Meza MD;  Location: [facility]; Service: Gastroenterology    NEUROPLASTY / TRANSPOSITION MEDIAN NERVE AT CARPAL TUNNEL      LEFT & RIGHT      Family History:     Family History   Problem Relation Age of Onset    Heart disease Mother     Hypertension Mother     Ulcers Father     Stomach cancer Father       Social History:     Social History     Socioeconomic History    Marital status: /Civil Union     Spouse name: Not on file    Number of children: Not on file    Years of education: Not on file    Highest education level: Not on file   Occupational History    Not on file   Tobacco Use    Smoking status: Never Smoker    Smokeless tobacco: Never Used   Vaping Use    Vaping Use: Never used   Substance and Sexual Activity    Alcohol use: No    Drug use: No    Sexual activity: Not on file   Other Topics Concern    Not on file   Social History Narrative    Not on file     Social Determinants of Health     Financial Resource Strain:     Difficulty of Paying Living Expenses:    Food Insecurity:     Worried About Running Out of Food in the Last Year:     [address] in the Last Year:    Transportation Needs:     Lack of Transportation (Medical):      Lack of Transportation (Non-Medical):    Physical Activity:     Days of Exercise per Week:     Minutes of Exercise per Session: Stress:     Feeling of Stress :    Social Connections:     Frequency of Communication with Friends and Family:     Frequency of Social Gatherings with Friends and Family:     Attends Nondenominational Services:     Active Member of Clubs or Organizations:     Attends Club or Organization Meetings:     Marital Status:    Intimate Partner Violence:     Fear of Current or Ex-Partner:     Emotionally Abused:     Physically Abused:     Sexually Abused:       Medications and Allergies:     Current Outpatient Medications   Medication Sig Dispense Refill    amLODIPine (NORVASC) 10 mg tablet Take 1 tablet (10 mg total) by mouth daily 90 tablet 1    candesartan (ATACAND) 32 MG tablet Take 1 tablet (32 mg total) by mouth daily 90 tablet 1    ibuprofen (MOTRIN) 600 mg tablet TOME CATHY TABLETA POR VIA ORAL CADA OCHO HORAS CUANDO SEA NECESARIO DOLOR LEVE 90 tablet 2    linaGLIPtin 5 MG TABS Take 5 mg by mouth daily 90 tablet 1    metFORMIN (GLUCOPHAGE) 1000 MG tablet TOME CATHY TABLETA DOS VECES AL LUIS ALBERTO 180 tablet 1    omega-3-acid ethyl esters (LOVAZA) 1 g capsule Take 2 g by mouth      omeprazole (PriLOSEC) 40 MG capsule TOME CATHY CAPSULA TODOS LOS ESCOBAR 90 capsule 1    oxybutynin (DITROPAN-XL) 10 MG 24 hr tablet TOME CATHY TABLETA TODOS LOS ESCOBAR 90 tablet 1    rosuvastatin (CRESTOR) 20 MG tablet TOME CATHY TABLETA TODOS LOS ESCOBAR 90 tablet 1    sildenafil (VIAGRA) 100 mg tablet Take 1 tablet (100 mg total) by mouth daily as needed for erectile dysfunction 3 tablet 5    tamsulosin (FLOMAX) 0 4 mg TOME CATHY CAPSULA TODOS LOS ESCOBAR WITH DINNER 90 capsule 1     No current facility-administered medications for this visit       No Known Allergies   Immunizations:     Immunization History   Administered Date(s) Administered    INFLUENZA 10/06/2018    Influenza Split High Dose Preservative Free IM 11/30/2017, 10/06/2018    Influenza, high dose seasonal 0 7 mL 10/21/2019, 10/12/2020    Pneumococcal Conjugate 13-Valent 10/26/2018  Pneumococcal Polysaccharide PPV23 02/18/2020    SARS-CoV-2 / COVID-19 mRNA IM (Susanne Gomez) 01/19/2021, 02/15/2021      Health Maintenance:         Topic Date Due    Colorectal Cancer Screening  02/08/2028    Hepatitis C Screening  Completed         Topic Date Due    DTaP,Tdap,and Td Vaccines (1 - Tdap) Never done      Medicare Health Risk Assessment:     There were no vitals taken for this visit  Andriy is here for his Subsequent Wellness visit  Health Risk Assessment:   Patient rates overall health as good  Patient feels that their physical health rating is same  Patient is satisfied with their life  Eyesight was rated as same  Hearing was rated as same  Patient feels that their emotional and mental health rating is same  Patients states they are never, rarely angry  Patient states they are sometimes unusually tired/fatigued  Pain experienced in the last 7 days has been some  Patient's pain rating has been 5/10  Patient states that he has experienced no weight loss or gain in last 6 months  Depression Screening:   PHQ-2 Score: 0      Fall Risk Screening: In the past year, patient has experienced: no history of falling in past year      Home Safety:  Patient does not have trouble with stairs inside or outside of their home  Patient has working smoke alarms and has working carbon monoxide detector  Home safety hazards include: none  Nutrition:   Current diet is Diabetic, Low Cholesterol, Low Saturated Fat, Low Carb and No Added Salt  Medications:   Patient is not currently taking any over-the-counter supplements  Patient is able to manage medications  Activities of Daily Living (ADLs)/Instrumental Activities of Daily Living (IADLs):   Walk and transfer into and out of bed and chair?: Yes  Dress and groom yourself?: Yes    Bathe or shower yourself?: Yes    Feed yourself?  Yes  Do your laundry/housekeeping?: Yes  Manage your money, pay your bills and track your expenses?: Yes  Make your own meals?: Yes    Do your own shopping?: Yes    Durable Medical Equipment Suppliers  no preference    Previous Hospitalizations:   Any hospitalizations or ED visits within the last 12 months?: No      Advance Care Planning:   Living will: No    Durable POA for healthcare: No    Advanced directive: No    Advanced directive counseling given: Yes    Five wishes given: No    Patient declined ACP directive: Yes    End of Life Decisions reviewed with patient: Yes    Provider agrees with end of life decisions: Yes      Cognitive Screening:   Provider or family/friend/caregiver concerned regarding cognition?: No    PREVENTIVE SCREENINGS      Cardiovascular Screening:    General: Screening Not Indicated, History Lipid Disorder and Screening Current      Diabetes Screening:     General: Screening Not Indicated and History Diabetes      Colorectal Cancer Screening:     General: Screening Current      Prostate Cancer Screening:      Due for: PSA      Osteoporosis Screening:    General: Screening Not Indicated      Abdominal Aortic Aneurysm (AAA) Screening:    Risk factors include: age between 73-67 yo        General: Screening Not Indicated      Lung Cancer Screening:     General: Screening Not Indicated      Hepatitis C Screening:    General: Screening Current    Screening, Brief Intervention, and Referral to Treatment (SBIRT)    Screening  Typical number of drinks in a day: 0  Typical number of drinks in a week: 0  Interpretation: Low risk drinking behavior      Single Item Drug Screening:  How often have you used an illegal drug (including marijuana) or a prescription medication for non-medical reasons in the past year? never    Single Item Drug Screen Score: 0  Interpretation: Negative screen for possible drug use disorder      Adam Varner MD

## 2021-06-26 NOTE — ASSESSMENT & PLAN NOTE
Nocturia 2 to 3 times per night  No improvement with the use of tamsulosin  Patient would like to try an alternative medication    We will initiate finasteride 5 mg daily

## 2021-06-26 NOTE — ASSESSMENT & PLAN NOTE
Lab Results   Component Value Date    HGBA1C 5 9 06/25/2021     Diabetes is well controlled on diet therapy along with linagliptin and metformin

## 2021-06-26 NOTE — ASSESSMENT & PLAN NOTE
No complaints of shortness of breath, orthopnea, PND, palpitations, lightheadedness    Clinically stable

## 2021-09-07 ENCOUNTER — TELEPHONE (OUTPATIENT)
Dept: NEUROSURGERY | Facility: CLINIC | Age: 71
End: 2021-09-07

## 2021-09-07 NOTE — TELEPHONE ENCOUNTER
9/7/21  COULD YOU PLEASE CALL PT?  HE NEEDS [place] SPEAKING PERSON  TO HELP R/S MRI BRAIN AND FOLLOW UP APPIHSAN Lopez

## 2021-09-16 NOTE — TELEPHONE ENCOUNTER
I was able to get through to patient today  He states he is waiting for his daughter to be available to schedule his L/S MRI  I instructed patient ot c/b once imaging is schedule for his f/u   Patient agreed but stated that he would prefer to go to [place] since it's a lot closer for him

## 2021-09-24 ENCOUNTER — TELEPHONE (OUTPATIENT)
Dept: INTERNAL MEDICINE CLINIC | Facility: CLINIC | Age: 71
End: 2021-09-24

## 2021-09-24 DIAGNOSIS — N39.41 URGE INCONTINENCE: ICD-10-CM

## 2021-09-24 RX ORDER — OXYBUTYNIN CHLORIDE 10 MG/1
TABLET, EXTENDED RELEASE ORAL
Qty: 90 TABLET | Refills: 1 | Status: SHIPPED | OUTPATIENT
Start: 2021-09-24 | End: 2021-10-29 | Stop reason: SDUPTHER

## 2021-10-21 ENCOUNTER — RA CDI HCC (OUTPATIENT)
Dept: OTHER | Facility: HOSPITAL | Age: 71
End: 2021-10-21

## 2021-10-26 ENCOUNTER — APPOINTMENT (OUTPATIENT)
Dept: LAB | Facility: HOSPITAL | Age: 71
End: 2021-10-26
Attending: INTERNAL MEDICINE
Payer: COMMERCIAL

## 2021-10-26 DIAGNOSIS — I42.0 DILATED CARDIOMYOPATHY (HCC): ICD-10-CM

## 2021-10-26 DIAGNOSIS — E11.41 TYPE 2 DIABETES MELLITUS WITH DIABETIC MONONEUROPATHY, WITHOUT LONG-TERM CURRENT USE OF INSULIN (HCC): ICD-10-CM

## 2021-10-26 DIAGNOSIS — N40.1 BPH WITH OBSTRUCTION/LOWER URINARY TRACT SYMPTOMS: ICD-10-CM

## 2021-10-26 DIAGNOSIS — E78.00 HYPERCHOLESTEROLEMIA: ICD-10-CM

## 2021-10-26 DIAGNOSIS — K21.9 CHRONIC GERD: ICD-10-CM

## 2021-10-26 DIAGNOSIS — N13.8 BPH WITH OBSTRUCTION/LOWER URINARY TRACT SYMPTOMS: ICD-10-CM

## 2021-10-26 DIAGNOSIS — E11.9 TYPE 2 DIABETES MELLITUS WITHOUT COMPLICATION, WITHOUT LONG-TERM CURRENT USE OF INSULIN (HCC): ICD-10-CM

## 2021-10-26 LAB
ALBUMIN SERPL BCP-MCNC: 3.7 G/DL (ref 3.5–5)
ALP SERPL-CCNC: 64 U/L (ref 46–116)
ALT SERPL W P-5'-P-CCNC: 27 U/L (ref 12–78)
ANION GAP SERPL CALCULATED.3IONS-SCNC: 7 MMOL/L (ref 4–13)
AST SERPL W P-5'-P-CCNC: 14 U/L (ref 5–45)
BILIRUB SERPL-MCNC: 0.52 MG/DL (ref 0.2–1)
BUN SERPL-MCNC: 22 MG/DL (ref 5–25)
CALCIUM SERPL-MCNC: 9.1 MG/DL (ref 8.3–10.1)
CHLORIDE SERPL-SCNC: 104 MMOL/L (ref 100–108)
CHOLEST SERPL-MCNC: 120 MG/DL (ref 50–200)
CO2 SERPL-SCNC: 27 MMOL/L (ref 21–32)
CREAT SERPL-MCNC: 0.92 MG/DL (ref 0.6–1.3)
CREAT UR-MCNC: 229 MG/DL
ERYTHROCYTE [DISTWIDTH] IN BLOOD BY AUTOMATED COUNT: 13.8 % (ref 11.6–15.1)
EST. AVERAGE GLUCOSE BLD GHB EST-MCNC: 131 MG/DL
GFR SERPL CREATININE-BSD FRML MDRD: 83 ML/MIN/1.73SQ M
GLUCOSE P FAST SERPL-MCNC: 128 MG/DL (ref 65–99)
HBA1C MFR BLD: 6.2 %
HCT VFR BLD AUTO: 42.6 % (ref 36.5–49.3)
HDLC SERPL-MCNC: 39 MG/DL
HGB BLD-MCNC: 14 G/DL (ref 12–17)
LDLC SERPL CALC-MCNC: 49 MG/DL (ref 0–100)
MCH RBC QN AUTO: 29.1 PG (ref 26.8–34.3)
MCHC RBC AUTO-ENTMCNC: 32.9 G/DL (ref 31.4–37.4)
MCV RBC AUTO: 89 FL (ref 82–98)
MICROALBUMIN UR-MCNC: 12.6 MG/L (ref 0–20)
MICROALBUMIN/CREAT 24H UR: 6 MG/G CREATININE (ref 0–30)
NONHDLC SERPL-MCNC: 81 MG/DL
PLATELET # BLD AUTO: 213 THOUSANDS/UL (ref 149–390)
PMV BLD AUTO: 9.4 FL (ref 8.9–12.7)
POTASSIUM SERPL-SCNC: 4.3 MMOL/L (ref 3.5–5.3)
PROT SERPL-MCNC: 7.4 G/DL (ref 6.4–8.2)
PSA SERPL-MCNC: 1 NG/ML (ref 0–4)
RBC # BLD AUTO: 4.81 MILLION/UL (ref 3.88–5.62)
SODIUM SERPL-SCNC: 138 MMOL/L (ref 136–145)
TRIGL SERPL-MCNC: 158 MG/DL
WBC # BLD AUTO: 8.98 THOUSAND/UL (ref 4.31–10.16)

## 2021-10-26 PROCEDURE — 36415 COLL VENOUS BLD VENIPUNCTURE: CPT

## 2021-10-26 PROCEDURE — 85027 COMPLETE CBC AUTOMATED: CPT

## 2021-10-26 PROCEDURE — 82043 UR ALBUMIN QUANTITATIVE: CPT

## 2021-10-26 PROCEDURE — 80053 COMPREHEN METABOLIC PANEL: CPT

## 2021-10-26 PROCEDURE — G0103 PSA SCREENING: HCPCS

## 2021-10-26 PROCEDURE — 80061 LIPID PANEL: CPT

## 2021-10-26 PROCEDURE — 82570 ASSAY OF URINE CREATININE: CPT

## 2021-10-26 PROCEDURE — 83036 HEMOGLOBIN GLYCOSYLATED A1C: CPT

## 2021-10-29 ENCOUNTER — OFFICE VISIT (OUTPATIENT)
Dept: INTERNAL MEDICINE CLINIC | Facility: CLINIC | Age: 71
End: 2021-10-29
Payer: COMMERCIAL

## 2021-10-29 VITALS
TEMPERATURE: 98.1 F | DIASTOLIC BLOOD PRESSURE: 66 MMHG | BODY MASS INDEX: 31.76 KG/M2 | HEART RATE: 81 BPM | HEIGHT: 62 IN | SYSTOLIC BLOOD PRESSURE: 112 MMHG | OXYGEN SATURATION: 99 % | WEIGHT: 172.6 LBS

## 2021-10-29 DIAGNOSIS — N39.41 URGE INCONTINENCE: ICD-10-CM

## 2021-10-29 DIAGNOSIS — E78.00 HYPERCHOLESTEROLEMIA: ICD-10-CM

## 2021-10-29 DIAGNOSIS — E11.69 ONYCHOMYCOSIS OF MULTIPLE TOENAILS WITH TYPE 2 DIABETES MELLITUS (HCC): Primary | ICD-10-CM

## 2021-10-29 DIAGNOSIS — Z13.5 SCREENING FOR DIABETIC RETINOPATHY: ICD-10-CM

## 2021-10-29 DIAGNOSIS — K21.9 CHRONIC GERD: ICD-10-CM

## 2021-10-29 DIAGNOSIS — I10 ESSENTIAL HYPERTENSION: ICD-10-CM

## 2021-10-29 DIAGNOSIS — E11.41 TYPE 2 DIABETES MELLITUS WITH DIABETIC MONONEUROPATHY, WITHOUT LONG-TERM CURRENT USE OF INSULIN (HCC): ICD-10-CM

## 2021-10-29 DIAGNOSIS — M25.512 ACUTE PAIN OF LEFT SHOULDER: ICD-10-CM

## 2021-10-29 DIAGNOSIS — N40.1 BPH WITH OBSTRUCTION/LOWER URINARY TRACT SYMPTOMS: ICD-10-CM

## 2021-10-29 DIAGNOSIS — I42.0 DILATED CARDIOMYOPATHY (HCC): ICD-10-CM

## 2021-10-29 DIAGNOSIS — I10 BENIGN ESSENTIAL HYPERTENSION: ICD-10-CM

## 2021-10-29 DIAGNOSIS — M47.816 SPONDYLOSIS OF LUMBAR REGION WITHOUT MYELOPATHY OR RADICULOPATHY: ICD-10-CM

## 2021-10-29 DIAGNOSIS — B35.1 ONYCHOMYCOSIS OF MULTIPLE TOENAILS WITH TYPE 2 DIABETES MELLITUS (HCC): Primary | ICD-10-CM

## 2021-10-29 DIAGNOSIS — E11.9 TYPE 2 DIABETES MELLITUS WITHOUT COMPLICATION, WITHOUT LONG-TERM CURRENT USE OF INSULIN (HCC): ICD-10-CM

## 2021-10-29 DIAGNOSIS — N13.8 BPH WITH OBSTRUCTION/LOWER URINARY TRACT SYMPTOMS: ICD-10-CM

## 2021-10-29 PROBLEM — A04.8 H. PYLORI INFECTION: Status: RESOLVED | Noted: 2018-07-17 | Resolved: 2021-10-29

## 2021-10-29 PROCEDURE — 99214 OFFICE O/P EST MOD 30 MIN: CPT | Performed by: INTERNAL MEDICINE

## 2021-10-29 RX ORDER — ROSUVASTATIN CALCIUM 20 MG/1
20 TABLET, COATED ORAL DAILY
Qty: 90 TABLET | Refills: 1 | Status: SHIPPED | OUTPATIENT
Start: 2021-10-29 | End: 2022-08-05 | Stop reason: SDUPTHER

## 2021-10-29 RX ORDER — CANDESARTAN 32 MG/1
32 TABLET ORAL DAILY
Qty: 90 TABLET | Refills: 1 | Status: SHIPPED | OUTPATIENT
Start: 2021-10-29 | End: 2022-08-02 | Stop reason: SDUPTHER

## 2021-10-29 RX ORDER — OMEPRAZOLE 40 MG/1
40 CAPSULE, DELAYED RELEASE ORAL DAILY
Qty: 90 CAPSULE | Refills: 1 | Status: SHIPPED | OUTPATIENT
Start: 2021-10-29 | End: 2022-06-09 | Stop reason: SDUPTHER

## 2021-10-29 RX ORDER — IBUPROFEN 600 MG/1
600 TABLET ORAL EVERY 8 HOURS PRN
Qty: 90 TABLET | Refills: 2 | Status: SHIPPED | OUTPATIENT
Start: 2021-10-29 | End: 2022-05-03 | Stop reason: SDUPTHER

## 2021-10-29 RX ORDER — AMLODIPINE BESYLATE 10 MG/1
10 TABLET ORAL DAILY
Qty: 90 TABLET | Refills: 1 | Status: SHIPPED | OUTPATIENT
Start: 2021-10-29 | End: 2022-06-09 | Stop reason: SDUPTHER

## 2021-10-29 RX ORDER — FINASTERIDE 5 MG/1
5 TABLET, FILM COATED ORAL DAILY
Qty: 90 TABLET | Refills: 1 | Status: SHIPPED | OUTPATIENT
Start: 2021-10-29 | End: 2022-07-07 | Stop reason: SDUPTHER

## 2021-10-29 RX ORDER — OXYBUTYNIN CHLORIDE 10 MG/1
TABLET, EXTENDED RELEASE ORAL
Qty: 90 TABLET | Refills: 1 | Status: SHIPPED | OUTPATIENT
Start: 2021-10-29 | End: 2022-07-05 | Stop reason: SDUPTHER

## 2021-11-20 ENCOUNTER — HOSPITAL ENCOUNTER (OUTPATIENT)
Dept: RADIOLOGY | Facility: HOSPITAL | Age: 71
Discharge: HOME | End: 2021-11-20
Payer: COMMERCIAL

## 2021-11-20 DIAGNOSIS — D43.2 SUBEPENDYMOMA (HCC): ICD-10-CM

## 2021-11-20 PROCEDURE — G1004 CDSM NDSC: HCPCS

## 2021-11-20 PROCEDURE — A9585 GADOBUTROL INJECTION: HCPCS | Performed by: NURSE PRACTITIONER

## 2021-11-20 PROCEDURE — 70553 MRI BRAIN STEM W/O & W/DYE: CPT

## 2021-11-20 RX ADMIN — GADOBUTROL 7 ML: 604.72 INJECTION INTRAVENOUS at 14:27

## 2021-11-22 ENCOUNTER — OFFICE VISIT (OUTPATIENT)
Dept: NEUROSURGERY | Facility: CLINIC | Age: 71
End: 2021-11-22
Payer: COMMERCIAL

## 2021-11-22 VITALS
SYSTOLIC BLOOD PRESSURE: 124 MMHG | RESPIRATION RATE: 16 BRPM | HEART RATE: 76 BPM | TEMPERATURE: 97 F | BODY MASS INDEX: 32.61 KG/M2 | WEIGHT: 177.2 LBS | HEIGHT: 62 IN | DIASTOLIC BLOOD PRESSURE: 68 MMHG

## 2021-11-22 DIAGNOSIS — D43.2 SUBEPENDYMOMA (HCC): Primary | ICD-10-CM

## 2021-11-22 DIAGNOSIS — Z01.812 PRE-PROCEDURAL LABORATORY EXAMINATIONS: ICD-10-CM

## 2021-11-22 PROCEDURE — 3074F SYST BP LT 130 MM HG: CPT | Performed by: NEUROLOGICAL SURGERY

## 2021-11-22 PROCEDURE — 99214 OFFICE O/P EST MOD 30 MIN: CPT | Performed by: NEUROLOGICAL SURGERY

## 2021-11-22 PROCEDURE — 3008F BODY MASS INDEX DOCD: CPT | Performed by: NEUROLOGICAL SURGERY

## 2021-11-22 PROCEDURE — 1160F RVW MEDS BY RX/DR IN RCRD: CPT | Performed by: NEUROLOGICAL SURGERY

## 2021-11-22 PROCEDURE — 3078F DIAST BP <80 MM HG: CPT | Performed by: NEUROLOGICAL SURGERY

## 2021-12-14 DIAGNOSIS — N20.0 NEPHROLITHIASIS: ICD-10-CM

## 2021-12-14 DIAGNOSIS — M47.816 SPONDYLOSIS OF LUMBAR REGION WITHOUT MYELOPATHY OR RADICULOPATHY: ICD-10-CM

## 2021-12-14 DIAGNOSIS — I10 BENIGN ESSENTIAL HYPERTENSION: ICD-10-CM

## 2021-12-14 DIAGNOSIS — K21.9 CHRONIC GERD: ICD-10-CM

## 2021-12-14 DIAGNOSIS — Z13.5 SCREENING FOR DIABETIC RETINOPATHY: ICD-10-CM

## 2021-12-14 DIAGNOSIS — I42.0 DILATED CARDIOMYOPATHY (HCC): ICD-10-CM

## 2021-12-14 DIAGNOSIS — E11.41 TYPE 2 DIABETES MELLITUS WITH DIABETIC MONONEUROPATHY, WITHOUT LONG-TERM CURRENT USE OF INSULIN (HCC): ICD-10-CM

## 2021-12-14 RX ORDER — AMLODIPINE BESYLATE 10 MG/1
10 TABLET ORAL DAILY
Qty: 90 TABLET | Refills: 0 | Status: CANCELLED | OUTPATIENT
Start: 2021-12-14

## 2021-12-14 RX ORDER — TAMSULOSIN HYDROCHLORIDE 0.4 MG/1
0.4 CAPSULE ORAL
Qty: 90 CAPSULE | Refills: 1 | Status: SHIPPED | OUTPATIENT
Start: 2021-12-14 | End: 2022-07-07 | Stop reason: SDUPTHER

## 2021-12-14 RX ORDER — OMEPRAZOLE 40 MG/1
40 CAPSULE, DELAYED RELEASE ORAL DAILY
Qty: 90 CAPSULE | Refills: 0 | Status: CANCELLED | OUTPATIENT
Start: 2021-12-14

## 2022-01-03 DIAGNOSIS — B35.1 ONYCHOMYCOSIS OF MULTIPLE TOENAILS WITH TYPE 2 DIABETES MELLITUS (HCC): ICD-10-CM

## 2022-01-03 DIAGNOSIS — E11.69 ONYCHOMYCOSIS OF MULTIPLE TOENAILS WITH TYPE 2 DIABETES MELLITUS (HCC): ICD-10-CM

## 2022-01-03 DIAGNOSIS — E11.41 TYPE 2 DIABETES MELLITUS WITH DIABETIC MONONEUROPATHY, WITHOUT LONG-TERM CURRENT USE OF INSULIN (HCC): Primary | ICD-10-CM

## 2022-01-19 ENCOUNTER — OFFICE VISIT (OUTPATIENT)
Dept: PODIATRY | Facility: CLINIC | Age: 72
End: 2022-01-19
Payer: COMMERCIAL

## 2022-01-19 VITALS
DIASTOLIC BLOOD PRESSURE: 70 MMHG | SYSTOLIC BLOOD PRESSURE: 115 MMHG | BODY MASS INDEX: 32.17 KG/M2 | HEART RATE: 74 BPM | WEIGHT: 174.8 LBS | HEIGHT: 62 IN

## 2022-01-19 DIAGNOSIS — B35.1 ONYCHOMYCOSIS OF MULTIPLE TOENAILS WITH TYPE 2 DIABETES MELLITUS (HCC): ICD-10-CM

## 2022-01-19 DIAGNOSIS — S90.211A SUBUNGUAL HEMATOMA OF GREAT TOE OF RIGHT FOOT, INITIAL ENCOUNTER: ICD-10-CM

## 2022-01-19 DIAGNOSIS — E11.41 TYPE 2 DIABETES MELLITUS WITH DIABETIC MONONEUROPATHY, WITHOUT LONG-TERM CURRENT USE OF INSULIN (HCC): Primary | ICD-10-CM

## 2022-01-19 DIAGNOSIS — E11.69 ONYCHOMYCOSIS OF MULTIPLE TOENAILS WITH TYPE 2 DIABETES MELLITUS (HCC): ICD-10-CM

## 2022-01-19 PROCEDURE — 3074F SYST BP LT 130 MM HG: CPT | Performed by: PODIATRIST

## 2022-01-19 PROCEDURE — 3008F BODY MASS INDEX DOCD: CPT | Performed by: PODIATRIST

## 2022-01-19 PROCEDURE — 1160F RVW MEDS BY RX/DR IN RCRD: CPT | Performed by: PODIATRIST

## 2022-01-19 PROCEDURE — 99204 OFFICE O/P NEW MOD 45 MIN: CPT | Performed by: PODIATRIST

## 2022-01-19 PROCEDURE — 3078F DIAST BP <80 MM HG: CPT | Performed by: PODIATRIST

## 2022-01-19 NOTE — PROGRESS NOTES
Assessment/Plan:         Diagnoses and all orders for this visit:    Type 2 diabetes mellitus with diabetic mononeuropathy, without long-term current use of insulin ([facility] )  -     Ambulatory referral to Podiatry    Onychomycosis of multiple toenails with type 2 diabetes mellitus ([facility] )  -     Ambulatory referral to Podiatry  -     ciclopirox (PENLAC) 8 % solution; Apply topically daily at bedtime    Subungual hematoma of great toe of right foot, initial encounter  Comments:  minor, no acute care necessary  Diagnosis and options discussed with patient  Patient agreeable to the plan as stated below    -Educated on DM risk to lower extremities, proper shoe gear, and daily monitoring of feet    -Educated on A1C and the risks of poorly controlled Diabetes   -Discussed weight loss and suitable exercise regiment  Healthy neuroavascular status  Annual DM foot exam unless new concerns arise  Reviewed bloodwork, his DM is well controlled  Reviewed PCP visit 10/29/21    Slovenian interpretor used throughout visit  Subjective:      Patient ID: Og Alvarez is a 70 y o  male  Patient presents with fungus on his toenails  He has been prescribed medicine multiple times but it keeps coming back  There is a bruise on the right toenail  The left great toenail has fungus  PMH DM2, A1C is controlled (6 2)  The following portions of the patient's history were reviewed and updated as appropriate:   He  has a past medical history of Abnormal echocardiogram (9/15/2017), Abnormal finding on MRI of brain (2/26/2020), Arthritis, Brain tumor ([facility] ) (2/26/2020), Carpal tunnel syndrome, Diabetes mellitus ([facility] ), Elevated prostate specific antigen (PSA), Erectile dysfunction, GERD (gastroesophageal reflux disease), H  pylori infection (7/17/2018), History of BPH, adenomatous colonic polyps, Hyperlipidemia, Hypertension, and Hypertension    He   Patient Active Problem List    Diagnosis Date Noted    Onychomycosis of multiple toenails with type 2 diabetes mellitus ([facility] ) 06/19/2020    Nephrolithiasis 12/09/2019    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    Subacromial bursitis of left shoulder joint 09/09/2019    Rotator cuff tendonitis, left 09/09/2019    Sensorineural hearing loss (SNHL) of left ear with unrestricted hearing of right ear 06/17/2019    Medicare annual wellness visit, subsequent 06/17/2019    Primary osteoarthritis of left shoulder 03/14/2019    Tinea pedis of right foot 10/26/2018    Erectile dysfunction 07/12/2018    Screening for diabetic retinopathy 07/12/2018    Vitamin D insufficiency 12/21/2017    Overactive bladder 12/21/2017    Hyperlipidemia 12/21/2017    Grade II internal hemorrhoids 12/21/2017    BPH with obstruction/lower urinary tract symptoms 12/21/2017    Chronic GERD 12/06/2017    Polyp of colon 11/30/2017    Osteoarthritis of lumbar spine 11/30/2017    Benign essential hypertension 11/30/2017    Type 2 diabetes mellitus with diabetic mononeuropathy, without long-term current use of insulin ([place] [address] ) 09/15/2017    Dilated cardiomyopathy ([place] [address] ) 09/15/2017    Bilateral carpal tunnel syndrome 02/08/2016     He  has a past surgical history that includes Appendectomy; Colonoscopy; Carpal tunnel release; EGD AND COLONOSCOPY (N/A, 2/8/2018); Cystoscopy w/ dilation of bladder (01/09/2017); and Neuroplasty / transposition median nerve at carpal tunnel  His family history includes Heart disease in his mother; Hypertension in his mother; Stomach cancer in his father; Ulcers in his father  He  reports that he has never smoked  He has never used smokeless tobacco  He reports that he does not drink alcohol and does not use drugs    Current Outpatient Medications   Medication Sig Dispense Refill    amLODIPine (NORVASC) 10 mg tablet Take 1 tablet (10 mg total) by mouth daily 90 tablet 1    candesartan (ATACAND) 32 MG tablet Take 1 tablet (32 mg total) by mouth daily 90 tablet 1    finasteride (PROSCAR) 5 mg tablet Take 1 tablet (5 mg total) by mouth daily 90 tablet 1    ibuprofen (MOTRIN) 600 mg tablet Take 1 tablet (600 mg total) by mouth every 8 (eight) hours as needed for mild pain 90 tablet 2    linaGLIPtin 5 MG TABS Take 5 mg by mouth daily 90 tablet 1    metFORMIN (GLUCOPHAGE) 1000 MG tablet Take 1 tablet (1,000 mg total) by mouth 2 (two) times a day with meals 180 tablet 1    omeprazole (PriLOSEC) 40 MG capsule Take 1 capsule (40 mg total) by mouth daily 90 capsule 1    oxybutynin (DITROPAN-XL) 10 MG 24 hr tablet TOME CATHY TABLETA TODOS LOS ESCOBAR 90 tablet 1    rosuvastatin (CRESTOR) 20 MG tablet Take 1 tablet (20 mg total) by mouth daily 90 tablet 1    sildenafil (VIAGRA) 100 mg tablet Take 1 tablet (100 mg total) by mouth daily as needed for erectile dysfunction 3 tablet 5    tamsulosin (FLOMAX) 0 4 mg Take 1 capsule (0 4 mg total) by mouth daily with dinner 90 capsule 1    ciclopirox (PENLAC) 8 % solution Apply topically daily at bedtime 6 6 mL 6    melatonin 1 mg Take 1 tablet (1 mg total) by mouth daily at bedtime (Patient not taking: Reported on 10/29/2021) 100 tablet 1    omega-3-acid ethyl esters (LOVAZA) 1 g capsule Take 2 g by mouth (Patient not taking: Reported on 10/29/2021)       No current facility-administered medications for this visit       Current Outpatient Medications on File Prior to Visit   Medication Sig    amLODIPine (NORVASC) 10 mg tablet Take 1 tablet (10 mg total) by mouth daily    candesartan (ATACAND) 32 MG tablet Take 1 tablet (32 mg total) by mouth daily    finasteride (PROSCAR) 5 mg tablet Take 1 tablet (5 mg total) by mouth daily    ibuprofen (MOTRIN) 600 mg tablet Take 1 tablet (600 mg total) by mouth every 8 (eight) hours as needed for mild pain    linaGLIPtin 5 MG TABS Take 5 mg by mouth daily    metFORMIN (GLUCOPHAGE) 1000 MG tablet Take 1 tablet (1,000 mg total) by mouth 2 (two) times a day with meals    omeprazole (PriLOSEC) 40 MG capsule Take 1 capsule (40 mg total) by mouth daily    oxybutynin (DITROPAN-XL) 10 MG 24 hr tablet TOME CATHY TABLETA TODOS LOS ESCOBAR    rosuvastatin (CRESTOR) 20 MG tablet Take 1 tablet (20 mg total) by mouth daily    sildenafil (VIAGRA) 100 mg tablet Take 1 tablet (100 mg total) by mouth daily as needed for erectile dysfunction    tamsulosin (FLOMAX) 0 4 mg Take 1 capsule (0 4 mg total) by mouth daily with dinner    melatonin 1 mg Take 1 tablet (1 mg total) by mouth daily at bedtime (Patient not taking: Reported on 10/29/2021)    omega-3-acid ethyl esters (LOVAZA) 1 g capsule Take 2 g by mouth (Patient not taking: Reported on 10/29/2021)     No current facility-administered medications on file prior to visit  He has No Known Allergies       Review of Systems   Constitutional: Negative  Respiratory: Negative  Cardiovascular: Negative  Gastrointestinal: Negative  Musculoskeletal: Negative  Skin: Positive for color change  Negative for wound  Neurological: Negative for weakness, numbness and headaches  Psychiatric/Behavioral: The patient is not nervous/anxious  Objective:      /70   Pulse 74   Ht 5' 2" (1 575 m) Comment: verbal  Wt 79 3 kg (174 lb 12 8 oz)   BMI 31 97 kg/m²     HEMOGLOBIN A1C W/ EAG ESTIMATION  Order: [id number]   Status: Final result     Visible to patient: Yes (seen)     Next appt: 04/19/2022 at 09:30 AM in Internal Medicine Nickolas Ferreira MD)     Dx: Dilated cardiomyopathy ([address] ); Type 2         0 Result Notes     1  Topic    Component Ref Range & Units 10/26/21  9:34 AM 6/25/21  4:29 PM 2/17/21  8:35 AM 10/9/20  9:09 AM 6/16/20 10:59 AM 2/10/20 10:15 AM 10/11/19 10:44 AM   Hemoglobin A1C Normal 3 8-5 6%; PreDiabetic 5 7-6 4%;  Diabetic >=6 5%; Glycemic control for adults with diabetes <7 0% % 6 2 High   5 9 R  5 8 High   6 2 High   6 3 High   7 0 High  R  7 4 High  R    EAG mg/dl 131   120  131  134  154  166               Specimen Collected: 10/26/21  9:34 AM Last Resulted: 10/26/21 12:04 PM                Physical Exam  Vitals reviewed  Constitutional:       Appearance: He is obese  He is not ill-appearing or diaphoretic  HENT:      Nose: No congestion or rhinorrhea  Cardiovascular:      Rate and Rhythm: Normal rate  Pulses: Normal pulses  no weak pulses          Dorsalis pedis pulses are 2+ on the right side and 2+ on the left side  Posterior tibial pulses are 2+ on the right side and 2+ on the left side  Pulmonary:      Effort: Pulmonary effort is normal  No respiratory distress  Musculoskeletal:         General: No tenderness or deformity  Right foot: Normal range of motion  No deformity or prominent metatarsal heads  Left foot: Normal range of motion  No deformity or prominent metatarsal heads  Feet:    Feet:      Right foot:      Protective Sensation: 10 sites tested  10 sites sensed  Skin integrity: Dry skin present  No ulcer, blister, skin breakdown or erythema  Toenail Condition: Right toenails are abnormally thick  Fungal disease present  Left foot:      Protective Sensation: 10 sites tested  10 sites sensed  Skin integrity: Dry skin present  No ulcer, blister, skin breakdown or erythema  Toenail Condition: Left toenails are abnormally thick  Fungal disease present  Skin:     Capillary Refill: Capillary refill takes less than 2 seconds  Findings: No erythema or rash  Neurological:      Mental Status: He is alert and oriented to person, place, and time  Sensory: No sensory deficit  Motor: No weakness  Gait: Gait normal    Psychiatric:         Mood and Affect: Mood normal        Diabetic Foot Exam    Patient's shoes and socks removed  Right Foot/Ankle   Right Foot Inspection  Skin Exam: skin intact, dry skin and abnormal color  No blister, no erythema, no maceration and no ulcer       Toe Exam: No swelling, no tenderness, erythema and  no right toe deformity    Sensory   Vibration: intact  Proprioception: intact  Monofilament testing: intact    Vascular  Capillary refills: < 3 seconds  The right DP pulse is 2+  The right PT pulse is 2+  Right Toe  - Comprehensive Exam  Arch: normal      Left Foot/Ankle  Left Foot Inspection  Skin Exam: skin intact, dry skin and abnormal color  No erythema, no maceration and no ulcer  Toe Exam: No swelling, no tenderness, no erythema and no left toe deformity  Sensory   Vibration: intact  Proprioception: intact  Monofilament testing: intact    Vascular  Capillary refills: < 3 seconds  The left DP pulse is 2+  The left PT pulse is 2+       Left Toe  - Comprehensive Exam  Arch: normal      Assign Risk Category  No deformity present  No loss of protective sensation  No weak pulses  Risk: 0

## 2022-04-12 ENCOUNTER — RA CDI HCC (OUTPATIENT)
Dept: OTHER | Facility: HOSPITAL | Age: 72
End: 2022-04-12

## 2022-04-12 NOTE — PROGRESS NOTES
[facility]  coding opportunities       Chart reviewed, no opportunity found: CHART REVIEWED, NO OPPORTUNITY FOUND        Patients Insurance     Medicare Insurance: [employer] [facility] Advantage

## 2022-05-03 DIAGNOSIS — M47.816 SPONDYLOSIS OF LUMBAR REGION WITHOUT MYELOPATHY OR RADICULOPATHY: ICD-10-CM

## 2022-05-03 DIAGNOSIS — M25.512 ACUTE PAIN OF LEFT SHOULDER: ICD-10-CM

## 2022-05-03 NOTE — TELEPHONE ENCOUNTER
Last ov   Next ov  - not scheduled  He did not show for appt on 4/19/22  Sent a message to patient to schedule a f/u appt

## 2022-05-05 RX ORDER — IBUPROFEN 600 MG/1
600 TABLET ORAL EVERY 8 HOURS PRN
Qty: 90 TABLET | Refills: 0 | Status: SHIPPED | OUTPATIENT
Start: 2022-05-05

## 2022-05-18 ENCOUNTER — APPOINTMENT (OUTPATIENT)
Dept: LAB | Facility: HOSPITAL | Age: 72
End: 2022-05-18
Attending: NEUROLOGICAL SURGERY
Payer: COMMERCIAL

## 2022-05-18 DIAGNOSIS — Z01.812 PRE-PROCEDURAL LABORATORY EXAMINATIONS: ICD-10-CM

## 2022-05-18 LAB
BUN SERPL-MCNC: 21 MG/DL (ref 5–25)
CREAT SERPL-MCNC: 0.87 MG/DL (ref 0.6–1.3)
GFR SERPL CREATININE-BSD FRML MDRD: 86 ML/MIN/1.73SQ M

## 2022-05-18 PROCEDURE — 82565 ASSAY OF CREATININE: CPT

## 2022-05-18 PROCEDURE — 84520 ASSAY OF UREA NITROGEN: CPT

## 2022-05-18 PROCEDURE — 36415 COLL VENOUS BLD VENIPUNCTURE: CPT

## 2022-05-23 ENCOUNTER — OFFICE VISIT (OUTPATIENT)
Dept: INTERNAL MEDICINE CLINIC | Facility: CLINIC | Age: 72
End: 2022-05-23
Payer: COMMERCIAL

## 2022-05-23 VITALS
DIASTOLIC BLOOD PRESSURE: 70 MMHG | BODY MASS INDEX: 30.39 KG/M2 | TEMPERATURE: 97.3 F | WEIGHT: 178 LBS | SYSTOLIC BLOOD PRESSURE: 122 MMHG | OXYGEN SATURATION: 96 % | HEIGHT: 64 IN | HEART RATE: 80 BPM

## 2022-05-23 DIAGNOSIS — M54.42 CHRONIC BILATERAL LOW BACK PAIN WITH BILATERAL SCIATICA: ICD-10-CM

## 2022-05-23 DIAGNOSIS — G89.29 CHRONIC BILATERAL LOW BACK PAIN WITH BILATERAL SCIATICA: ICD-10-CM

## 2022-05-23 DIAGNOSIS — N13.8 BPH WITH OBSTRUCTION/LOWER URINARY TRACT SYMPTOMS: ICD-10-CM

## 2022-05-23 DIAGNOSIS — M47.816 SPONDYLOSIS OF LUMBAR REGION WITHOUT MYELOPATHY OR RADICULOPATHY: ICD-10-CM

## 2022-05-23 DIAGNOSIS — M54.41 CHRONIC BILATERAL LOW BACK PAIN WITH BILATERAL SCIATICA: ICD-10-CM

## 2022-05-23 DIAGNOSIS — I10 BENIGN ESSENTIAL HYPERTENSION: Primary | ICD-10-CM

## 2022-05-23 DIAGNOSIS — N40.1 BPH WITH OBSTRUCTION/LOWER URINARY TRACT SYMPTOMS: ICD-10-CM

## 2022-05-23 DIAGNOSIS — E11.41 TYPE 2 DIABETES MELLITUS WITH DIABETIC MONONEUROPATHY, WITHOUT LONG-TERM CURRENT USE OF INSULIN (HCC): ICD-10-CM

## 2022-05-23 PROBLEM — N32.89 BLADDER SPASMS: Status: RESOLVED | Noted: 2022-05-23 | Resolved: 2022-05-23

## 2022-05-23 PROBLEM — N32.89 BLADDER SPASMS: Status: ACTIVE | Noted: 2022-05-23

## 2022-05-23 LAB — SL AMB POCT HEMOGLOBIN AIC: 6.6 (ref ?–6.5)

## 2022-05-23 PROCEDURE — 1101F PT FALLS ASSESS-DOCD LE1/YR: CPT | Performed by: INTERNAL MEDICINE

## 2022-05-23 PROCEDURE — 3044F HG A1C LEVEL LT 7.0%: CPT | Performed by: INTERNAL MEDICINE

## 2022-05-23 PROCEDURE — 3074F SYST BP LT 130 MM HG: CPT | Performed by: INTERNAL MEDICINE

## 2022-05-23 PROCEDURE — 3725F SCREEN DEPRESSION PERFORMED: CPT | Performed by: INTERNAL MEDICINE

## 2022-05-23 PROCEDURE — 99214 OFFICE O/P EST MOD 30 MIN: CPT | Performed by: INTERNAL MEDICINE

## 2022-05-23 PROCEDURE — 83036 HEMOGLOBIN GLYCOSYLATED A1C: CPT | Performed by: INTERNAL MEDICINE

## 2022-05-23 PROCEDURE — 3078F DIAST BP <80 MM HG: CPT | Performed by: INTERNAL MEDICINE

## 2022-05-23 PROCEDURE — 3288F FALL RISK ASSESSMENT DOCD: CPT | Performed by: INTERNAL MEDICINE

## 2022-05-23 PROCEDURE — 3008F BODY MASS INDEX DOCD: CPT | Performed by: INTERNAL MEDICINE

## 2022-05-23 PROCEDURE — 1160F RVW MEDS BY RX/DR IN RCRD: CPT | Performed by: INTERNAL MEDICINE

## 2022-05-23 NOTE — ASSESSMENT & PLAN NOTE
Patient has had an MRI of his spine scheduled but has never made the appointment  He continues to experience chronic low back pain with intermittent radiation down both buttocks  He has gone through several courses of physical therapy and continues to do home exercises with frequent flares of low back pain

## 2022-05-23 NOTE — PROGRESS NOTES
Assessment/Plan:    Bladder spasms          BPH with obstruction/lower urinary tract symptoms  Continue Ditropan  Not taking regularly    Benign essential hypertension  Blood pressure is nicely controlled on current medications  Chronic bilateral low back pain with bilateral sciatica  Patient has had an MRI of his spine scheduled but has never made the appointment  He continues to experience chronic low back pain with intermittent radiation down both buttocks  He has gone through several courses of physical therapy and continues to do home exercises with frequent flares of low back pain  Osteoarthritis of lumbar spine  History of degenerative osteoarthritis of the spine  No recent imaging in his chart  Type 2 diabetes mellitus with diabetic mononeuropathy, without long-term current use of insulin ([facility])    Lab Results   Component Value Date    HGBA1C 6 6 (A) 05/23/2022   Diabetes is controlled  No significant microalbuminuria       Diagnoses and all orders for this visit:    Benign essential hypertension    Spondylosis of lumbar region without myelopathy or radiculopathy  -     MRI lumbar spine wo contrast; Future    Type 2 diabetes mellitus with diabetic mononeuropathy, without long-term current use of insulin ([facility])  -     CBC; Future  -     Comprehensive metabolic panel; Future  -     Hemoglobin A1C; Future  -     Lipid panel; Future  -     Microalbumin / creatinine urine ratio; Future  -     POCT hemoglobin A1c    BPH with obstruction/lower urinary tract symptoms    Chronic bilateral low back pain with bilateral sciatica  -     MRI lumbar spine wo contrast; Future          Subjective:      Patient ID: Luis Angel Bruno is a 67 y o  male  Patient presents to the office for follow-up visit  He did not have any of his diabetic labs drawn  Hemoglobin A1c obtain point of care was acceptable at 6 6%  He reports that he has been experiencing some issues with bladder spasms    He is currently taking Ditropan but is unsure whether he is taking it on a regular basis  He denies any episodes of incontinence but he states that sometimes he has bladder spasms awaken him at night causing him to rush to the bathroom  He is also complaining of chronic low back pain that intermittently is giving him some sciatic type radiation down both buttocks  There have been times where he has been unable to arise from a chair because of back pain  He does exercises on a regular basis at home  He has previously been in physical therapy for low back pain which has helped a little bit but has not provided any long-term relief  He has been scheduled through neuro surgery to obtain MRI of his lumbar spine but he has never made the appointment for reasons unknown  He has no complaints of any chest discomfort  He denies any headaches, visual symptoms  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 which is being followed with MRIs on annual basis        Family History   Problem Relation Age of Onset    Heart disease Mother     Hypertension Mother     Ulcers Father     Stomach cancer Father      Social History     Socioeconomic History    Marital status: /Civil Union     Spouse name: Not on file    Number of children: Not on file    Years of education: Not on file    Highest education level: Not on file   Occupational History    Not on file   Tobacco Use    Smoking status: Never Smoker    Smokeless tobacco: Never Used   Vaping Use    Vaping Use: Never used   Substance and Sexual Activity    Alcohol use: No    Drug use: No    Sexual activity: Not on file   Other Topics Concern    Not on file   Social History Narrative    Not on file     Social Determinants of Health     Financial Resource Strain: Not on file   Food Insecurity: Not on file   Transportation Needs: Not on file   Physical Activity: Not on file   Stress: Not on file   Social Connections: Not on file   Intimate Partner Violence: Not on file   Housing Stability: Not on file     Past Medical History:   Diagnosis Date    Abnormal echocardiogram 9/15/2017    Abnormal finding on MRI of brain 2/26/2020    Arthritis     OSTEOARTHRITIS OF LUMBAR SPINE    [facility] ) 2/26/2020    Carpal tunnel syndrome     Diabetes mellitus ([facility] )     Elevated prostate specific antigen (PSA)     Erectile dysfunction     GERD (gastroesophageal reflux disease)     H  pylori infection 7/17/2018    History of BPH     Hx of adenomatous colonic polyps     Hyperlipidemia     Hypertension     Hypertension        Current Outpatient Medications:     amLODIPine (NORVASC) 10 mg tablet, Take 1 tablet (10 mg total) by mouth daily, Disp: 90 tablet, Rfl: 1    candesartan (ATACAND) 32 MG tablet, Take 1 tablet (32 mg total) by mouth daily, Disp: 90 tablet, Rfl: 1    ciclopirox (PENLAC) 8 % solution, Apply topically daily at bedtime, Disp: 6 6 mL, Rfl: 6    finasteride (PROSCAR) 5 mg tablet, Take 1 tablet (5 mg total) by mouth daily, Disp: 90 tablet, Rfl: 1    ibuprofen (MOTRIN) 600 mg tablet, Take 1 tablet (600 mg total) by mouth every 8 (eight) hours as needed for mild pain, Disp: 90 tablet, Rfl: 0    linaGLIPtin 5 MG TABS, Take 5 mg by mouth daily, Disp: 90 tablet, Rfl: 1    metFORMIN (GLUCOPHAGE) 1000 MG tablet, Take 1 tablet (1,000 mg total) by mouth 2 (two) times a day with meals, Disp: 180 tablet, Rfl: 1    omeprazole (PriLOSEC) 40 MG capsule, Take 1 capsule (40 mg total) by mouth daily, Disp: 90 capsule, Rfl: 1    oxybutynin (DITROPAN-XL) 10 MG 24 hr tablet, TOME CATHY TABLETA TODOS LOS ESCOBAR, Disp: 90 tablet, Rfl: 1    rosuvastatin (CRESTOR) 20 MG tablet, Take 1 tablet (20 mg total) by mouth daily, Disp: 90 tablet, Rfl: 1    sildenafil (VIAGRA) 100 mg tablet, Take 1 tablet (100 mg total) by mouth daily as needed for erectile dysfunction, Disp: 3 tablet, Rfl: 5    tamsulosin (FLOMAX) 0 4 mg, Take 1 capsule (0 4 mg total) by mouth daily with dinner, Disp: 90 capsule, Rfl: 1  No Known Allergies  Past Surgical History:   Procedure Laterality Date    APPENDECTOMY      CARPAL TUNNEL RELEASE      COLONOSCOPY      CYSTOSCOPY W/ DILATION OF BLADDER  01/09/2017    DR KILO LENZ, [facility]     EGD AND COLONOSCOPY N/A 2/8/2018    Procedure: EGD with biopsy AND COLONOSCOPY with polypectomy with hemo clip application;  Surgeon: Nannette Bellamy MD;  Location: [facility]; Service: Gastroenterology    NEUROPLASTY / TRANSPOSITION MEDIAN NERVE AT CARPAL TUNNEL      LEFT & RIGHT         Review of Systems   Constitutional: Negative  Negative for activity change, appetite change, chills, diaphoresis, fatigue, fever and unexpected weight change  HENT: Negative  Eyes: Negative  Respiratory: Negative  Cardiovascular: Negative  Gastrointestinal: Negative  Endocrine: Negative  Genitourinary: Positive for urgency (Frequent bladder spasms)  Musculoskeletal: Positive for back pain (Chronic with sciatica)  Skin: Negative  Neurological: Negative  Hematological: Negative  Psychiatric/Behavioral: The patient is not nervous/anxious  Objective:      /70 (BP Location: Left arm, Patient Position: Sitting, Cuff Size: Standard)   Pulse 80   Temp (!) 97 3 °F (36 3 °C) (Tympanic)   Ht 5' 3 62" (1 616 m)   Wt 80 7 kg (178 lb)   SpO2 96%   BMI 30 92 kg/m²          Physical Exam  Vitals reviewed  Constitutional:       General: He is not in acute distress  Appearance: Normal appearance  He is normal weight  He is not ill-appearing, toxic-appearing or diaphoretic  HENT:      Head: Normocephalic and atraumatic  Right Ear: External ear normal       Left Ear: External ear normal       Nose: Nose normal    Eyes:      General: No scleral icterus  Conjunctiva/sclera: Conjunctivae normal       Pupils: Pupils are equal, round, and reactive to light  Neck:      Vascular: No carotid bruit or JVD        Trachea: No tracheal deviation  Cardiovascular:      Rate and Rhythm: Normal rate and regular rhythm  Pulses: Normal pulses  Heart sounds: Normal heart sounds  No murmur heard  Pulmonary:      Effort: Pulmonary effort is normal  No respiratory distress  Breath sounds: Normal breath sounds  No rales  Abdominal:      General: Abdomen is flat  There is no distension  Musculoskeletal:         General: No swelling  Cervical back: Neck supple  Right lower leg: No edema  Left lower leg: No edema  Skin:     General: Skin is warm  Coloration: Skin is not jaundiced  Findings: No bruising, erythema or rash  Neurological:      General: No focal deficit present  Mental Status: He is alert and oriented to person, place, and time  Mental status is at baseline     Psychiatric:         Mood and Affect: Mood normal          Behavior: Behavior normal

## 2022-05-23 NOTE — ASSESSMENT & PLAN NOTE
Lab Results   Component Value Date    HGBA1C 6 6 (A) 05/23/2022   Diabetes is controlled    No significant microalbuminuria

## 2022-06-09 DIAGNOSIS — I10 BENIGN ESSENTIAL HYPERTENSION: ICD-10-CM

## 2022-06-09 DIAGNOSIS — K21.9 CHRONIC GERD: ICD-10-CM

## 2022-06-09 DIAGNOSIS — I42.0 DILATED CARDIOMYOPATHY (HCC): ICD-10-CM

## 2022-06-09 DIAGNOSIS — Z13.5 SCREENING FOR DIABETIC RETINOPATHY: ICD-10-CM

## 2022-06-09 DIAGNOSIS — E11.41 TYPE 2 DIABETES MELLITUS WITH DIABETIC MONONEUROPATHY, WITHOUT LONG-TERM CURRENT USE OF INSULIN (HCC): ICD-10-CM

## 2022-06-09 DIAGNOSIS — M47.816 SPONDYLOSIS OF LUMBAR REGION WITHOUT MYELOPATHY OR RADICULOPATHY: ICD-10-CM

## 2022-06-09 RX ORDER — OMEPRAZOLE 40 MG/1
40 CAPSULE, DELAYED RELEASE ORAL DAILY
Qty: 90 CAPSULE | Refills: 1 | Status: SHIPPED | OUTPATIENT
Start: 2022-06-09

## 2022-06-09 RX ORDER — AMLODIPINE BESYLATE 10 MG/1
10 TABLET ORAL DAILY
Qty: 90 TABLET | Refills: 1 | Status: SHIPPED | OUTPATIENT
Start: 2022-06-09

## 2022-06-11 ENCOUNTER — HOSPITAL ENCOUNTER (OUTPATIENT)
Dept: MRI IMAGING | Facility: HOSPITAL | Age: 72
Discharge: HOME | End: 2022-06-11
Attending: INTERNAL MEDICINE
Payer: COMMERCIAL

## 2022-06-11 DIAGNOSIS — M47.816 SPONDYLOSIS OF LUMBAR REGION WITHOUT MYELOPATHY OR RADICULOPATHY: ICD-10-CM

## 2022-06-11 DIAGNOSIS — G89.29 CHRONIC BILATERAL LOW BACK PAIN WITH BILATERAL SCIATICA: ICD-10-CM

## 2022-06-11 DIAGNOSIS — M54.41 CHRONIC BILATERAL LOW BACK PAIN WITH BILATERAL SCIATICA: ICD-10-CM

## 2022-06-11 DIAGNOSIS — M54.42 CHRONIC BILATERAL LOW BACK PAIN WITH BILATERAL SCIATICA: ICD-10-CM

## 2022-06-11 PROCEDURE — 72148 MRI LUMBAR SPINE W/O DYE: CPT

## 2022-06-11 PROCEDURE — G1004 CDSM NDSC: HCPCS

## 2022-06-24 ENCOUNTER — OFFICE VISIT (OUTPATIENT)
Dept: INTERNAL MEDICINE CLINIC | Facility: CLINIC | Age: 72
End: 2022-06-24
Payer: COMMERCIAL

## 2022-06-24 VITALS
HEIGHT: 63 IN | DIASTOLIC BLOOD PRESSURE: 80 MMHG | OXYGEN SATURATION: 96 % | TEMPERATURE: 97.1 F | SYSTOLIC BLOOD PRESSURE: 130 MMHG | BODY MASS INDEX: 31.25 KG/M2 | WEIGHT: 176.4 LBS | HEART RATE: 78 BPM

## 2022-06-24 DIAGNOSIS — E11.41 TYPE 2 DIABETES MELLITUS WITH DIABETIC MONONEUROPATHY, WITHOUT LONG-TERM CURRENT USE OF INSULIN (HCC): ICD-10-CM

## 2022-06-24 DIAGNOSIS — M47.816 OSTEOARTHRITIS OF LUMBAR SPINE WITHOUT MYELOPATHY OR RADICULOPATHY: Primary | ICD-10-CM

## 2022-06-24 DIAGNOSIS — D43.2 SUBEPENDYMOMA (HCC): ICD-10-CM

## 2022-06-24 DIAGNOSIS — M47.816 SPONDYLOSIS OF LUMBAR REGION WITHOUT MYELOPATHY OR RADICULOPATHY: ICD-10-CM

## 2022-06-24 DIAGNOSIS — I42.0 DILATED CARDIOMYOPATHY (HCC): ICD-10-CM

## 2022-06-24 PROCEDURE — 3008F BODY MASS INDEX DOCD: CPT | Performed by: INTERNAL MEDICINE

## 2022-06-24 PROCEDURE — 3075F SYST BP GE 130 - 139MM HG: CPT | Performed by: INTERNAL MEDICINE

## 2022-06-24 PROCEDURE — 3079F DIAST BP 80-89 MM HG: CPT | Performed by: INTERNAL MEDICINE

## 2022-06-24 PROCEDURE — 1160F RVW MEDS BY RX/DR IN RCRD: CPT | Performed by: INTERNAL MEDICINE

## 2022-06-24 PROCEDURE — 99214 OFFICE O/P EST MOD 30 MIN: CPT | Performed by: INTERNAL MEDICINE

## 2022-06-24 RX ORDER — METHYLPREDNISOLONE 4 MG/1
TABLET ORAL
Qty: 21 EACH | Refills: 0 | Status: SHIPPED | OUTPATIENT
Start: 2022-06-24

## 2022-06-24 NOTE — PROGRESS NOTES
Assessment/Plan:    Dilated cardiomyopathy [facility])  Patient had previously followed with Meme Perez in [place]  He had a stress test which revealed no evidence of reversible ischemia in 2017  He also had an echocardiogram which revealed a low normal ejection fraction with apical hypokinesis  There is no mention of dilated cardiomyopathy  Osteoarthritis of lumbar spine  Recent MRI shows degenerative osteoarthritic changes throughout the lumbar spine with disc bulges but no evidence of nerve root impingement or disc herniation or central canal stenosis  He has been treated with physical therapy in [place] and found it to be minimally effective and does not wish to proceed with additional physical therapy at this time  Will place him on a trial of methylprednisolone for 1 week  He will hold his ibuprofen  And we will monitor his response  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[facility])  Has follow-up with neuro surgery in November  Asymptomatic    Type 2 diabetes mellitus with diabetic mononeuropathy, without long-term current use of insulin ([facility])    Lab Results   Component Value Date    HGBA1C 6 6 (A) 05/23/2022   Adequately controlled on current medications  Follow-up in November       Diagnoses and all orders for this visit:    Osteoarthritis of lumbar spine without myelopathy or radiculopathy  -     methylPREDNISolone 4 MG tablet therapy pack; Use as directed on package    Dilated cardiomyopathy ([address] )    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[address] )    Spondylosis of lumbar region without myelopathy or radiculopathy    Type 2 diabetes mellitus with diabetic mononeuropathy, without long-term current use of insulin ([facility])          Subjective:      Patient ID: Tung Tapia is a 67 y o  male  Patient presents to the office to review his MRI findings on his lower back    MRI disclosed diffuse degenerative changes throughout the cervical spine with some disc bulges but no evidence of nerve root compression, no sent severe central spinal canal stenosis there was some mild foraminal stenosis at level L4-L5  Otherwise patient feels well  He denies any chest pain or dyspnea at rest or on exertion denies any orthopnea  His blood sugar readings have been stable  He has had no headaches, lightheadedness or dizziness  He has a history of a subependymoma which is being monitored  He has a follow-up with neuro surgery scheduled in the fall  His appetite is good  History is wondering what else can be done for his back pain    He has been to physical therapy previously and prefers not to do that again since it was not very satisfactory in previous attempts      Family History   Problem Relation Age of Onset    Heart disease Mother     Hypertension Mother     Ulcers Father     Stomach cancer Father      Social History     Socioeconomic History    Marital status: /Civil Union     Spouse name: Not on file    Number of children: Not on file    Years of education: Not on file    Highest education level: Not on file   Occupational History    Not on file   Tobacco Use    Smoking status: Never Smoker    Smokeless tobacco: Never Used   Vaping Use    Vaping Use: Never used   Substance and Sexual Activity    Alcohol use: No    Drug use: No    Sexual activity: Not on file   Other Topics Concern    Not on file   Social History Narrative    Not on file     Social Determinants of Health     Financial Resource Strain: Not on file   Food Insecurity: Not on file   Transportation Needs: Not on file   Physical Activity: Not on file   Stress: Not on file   Social Connections: Not on file   Intimate Partner Violence: Not on file   Housing Stability: Not on file     Past Medical History:   Diagnosis Date    Abnormal echocardiogram 9/15/2017    Abnormal finding on MRI of brain 2/26/2020    Arthritis     OSTEOARTHRITIS OF LUMBAR SPINE    [facility] ) 2/26/2020    Carpal tunnel syndrome     Diabetes mellitus ([facility] )     Elevated prostate specific antigen (PSA)     Erectile dysfunction     GERD (gastroesophageal reflux disease)     H  pylori infection 7/17/2018    History of BPH     Hx of adenomatous colonic polyps     Hyperlipidemia     Hypertension     Hypertension        Current Outpatient Medications:     amLODIPine (NORVASC) 10 mg tablet, Take 1 tablet (10 mg total) by mouth daily, Disp: 90 tablet, Rfl: 1    candesartan (ATACAND) 32 MG tablet, Take 1 tablet (32 mg total) by mouth daily, Disp: 90 tablet, Rfl: 1    ciclopirox (PENLAC) 8 % solution, Apply topically daily at bedtime, Disp: 6 6 mL, Rfl: 6    finasteride (PROSCAR) 5 mg tablet, Take 1 tablet (5 mg total) by mouth daily, Disp: 90 tablet, Rfl: 1    ibuprofen (MOTRIN) 600 mg tablet, Take 1 tablet (600 mg total) by mouth every 8 (eight) hours as needed for mild pain, Disp: 90 tablet, Rfl: 0    linaGLIPtin 5 MG TABS, Take 5 mg by mouth daily, Disp: 90 tablet, Rfl: 1    metFORMIN (GLUCOPHAGE) 1000 MG tablet, Take 1 tablet (1,000 mg total) by mouth 2 (two) times a day with meals, Disp: 180 tablet, Rfl: 1    methylPREDNISolone 4 MG tablet therapy pack, Use as directed on package, Disp: 21 each, Rfl: 0    omeprazole (PriLOSEC) 40 MG capsule, Take 1 capsule (40 mg total) by mouth daily, Disp: 90 capsule, Rfl: 1    oxybutynin (DITROPAN-XL) 10 MG 24 hr tablet, TOME CATHY TABLETA TODOS LOS ESCOBAR, Disp: 90 tablet, Rfl: 1    rosuvastatin (CRESTOR) 20 MG tablet, Take 1 tablet (20 mg total) by mouth daily, Disp: 90 tablet, Rfl: 1    sildenafil (VIAGRA) 100 mg tablet, Take 1 tablet (100 mg total) by mouth daily as needed for erectile dysfunction, Disp: 3 tablet, Rfl: 5    tamsulosin (FLOMAX) 0 4 mg, Take 1 capsule (0 4 mg total) by mouth daily with dinner, Disp: 90 capsule, Rfl: 1  No Known Allergies  Past Surgical History:   Procedure Laterality Date    APPENDECTOMY      CARPAL TUNNEL RELEASE      COLONOSCOPY      CYSTOSCOPY W/ DILATION OF BLADDER  01/09/2017    DR Francesco Salamanca, [facility]     EGD AND COLONOSCOPY N/A 2/8/2018    Procedure: EGD with biopsy AND COLONOSCOPY with polypectomy with hemo clip application;  Surgeon: Monae Gould MD;  Location: [facility]; Service: Gastroenterology    NEUROPLASTY / TRANSPOSITION MEDIAN NERVE AT CARPAL TUNNEL      LEFT & RIGHT         Review of Systems   Constitutional: Negative  Negative for activity change, appetite change, chills, diaphoresis, fatigue, fever and unexpected weight change  HENT: Negative  Eyes: Negative  Respiratory: Negative  Cardiovascular: Negative  Gastrointestinal: Negative  Endocrine: Negative  Genitourinary: Negative  Musculoskeletal: Positive for back pain (Lower back pain without sciatica)  Skin: Negative  Neurological: Negative  Hematological: Negative  Psychiatric/Behavioral: The patient is not nervous/anxious  Objective:      /80 (BP Location: Left arm, Patient Position: Sitting, Cuff Size: Standard)   Pulse 78   Temp (!) 97 1 °F (36 2 °C) (Temporal)   Ht 5' 3 31" (1 608 m)   Wt 80 kg (176 lb 6 4 oz)   SpO2 96%   BMI 30 95 kg/m²          Physical Exam  Vitals reviewed  Constitutional:       General: He is not in acute distress  Appearance: Normal appearance  He is normal weight  He is not ill-appearing, toxic-appearing or diaphoretic  HENT:      Head: Normocephalic and atraumatic  Right Ear: External ear normal       Left Ear: External ear normal       Nose: Nose normal    Eyes:      General: No scleral icterus  Conjunctiva/sclera: Conjunctivae normal       Pupils: Pupils are equal, round, and reactive to light  Neck:      Vascular: No carotid bruit or JVD  Trachea: No tracheal deviation  Cardiovascular:      Rate and Rhythm: Normal rate and regular rhythm  Pulses: Normal pulses  Heart sounds: Normal heart sounds  No murmur heard    Pulmonary:      Effort: Pulmonary effort is normal  No respiratory distress  Breath sounds: Normal breath sounds  No rales  Abdominal:      General: Abdomen is flat  There is no distension  Musculoskeletal:         General: No swelling  Cervical back: Neck supple  Right lower leg: No edema  Left lower leg: No edema  Skin:     General: Skin is warm  Coloration: Skin is not jaundiced  Findings: No bruising, erythema or rash  Neurological:      General: No focal deficit present  Mental Status: He is alert and oriented to person, place, and time  Mental status is at baseline     Psychiatric:         Mood and Affect: Mood normal          Behavior: Behavior normal

## 2022-06-24 NOTE — ASSESSMENT & PLAN NOTE
Patient had previously followed with [facility] in [place]  He had a stress test which revealed no evidence of reversible ischemia in 2017  He also had an echocardiogram which revealed a low normal ejection fraction with apical hypokinesis  There is no mention of dilated cardiomyopathy

## 2022-06-24 NOTE — ASSESSMENT & PLAN NOTE
Lab Results   Component Value Date    HGBA1C 6 6 (A) 05/23/2022   Adequately controlled on current medications    Follow-up in November

## 2022-06-24 NOTE — ASSESSMENT & PLAN NOTE
Recent MRI shows degenerative osteoarthritic changes throughout the lumbar spine with disc bulges but no evidence of nerve root impingement or disc herniation or central canal stenosis  He has been [place] with physical therapy in [place] and found it to be minimally effective and does not wish to proceed with additional physical therapy at this time  Will place him on a trial of methylprednisolone for 1 week  He will hold his ibuprofen  And we will monitor his response

## 2022-06-28 ENCOUNTER — TELEPHONE (OUTPATIENT)
Dept: INTERNAL MEDICINE CLINIC | Facility: CLINIC | Age: 72
End: 2022-06-28

## 2022-07-05 ENCOUNTER — TELEPHONE (OUTPATIENT)
Dept: INTERNAL MEDICINE CLINIC | Facility: CLINIC | Age: 72
End: 2022-07-05

## 2022-07-05 DIAGNOSIS — N39.41 URGE INCONTINENCE: ICD-10-CM

## 2022-07-05 RX ORDER — OXYBUTYNIN CHLORIDE 10 MG/1
TABLET, EXTENDED RELEASE ORAL
Qty: 90 TABLET | Refills: 1 | Status: SHIPPED | OUTPATIENT
Start: 2022-07-05

## 2022-07-05 NOTE — TELEPHONE ENCOUNTER
Rx refill for oxybutynin (DITROPAN-XL) 10 MG 24 hr tablet and finasteride (PROSCAR) 5 mg tablet       Send to [facility] [address] [facility], [place] - [address]- 6/24/22 NOV- 11/29/22

## 2022-07-07 ENCOUNTER — TELEPHONE (OUTPATIENT)
Dept: INTERNAL MEDICINE CLINIC | Facility: CLINIC | Age: 72
End: 2022-07-07

## 2022-07-07 DIAGNOSIS — N20.0 NEPHROLITHIASIS: ICD-10-CM

## 2022-07-07 DIAGNOSIS — N40.1 BPH WITH OBSTRUCTION/LOWER URINARY TRACT SYMPTOMS: ICD-10-CM

## 2022-07-07 DIAGNOSIS — N13.8 BPH WITH OBSTRUCTION/LOWER URINARY TRACT SYMPTOMS: ICD-10-CM

## 2022-07-07 RX ORDER — TAMSULOSIN HYDROCHLORIDE 0.4 MG/1
0.4 CAPSULE ORAL
Qty: 90 CAPSULE | Refills: 1 | Status: SHIPPED | OUTPATIENT
Start: 2022-07-07

## 2022-07-07 RX ORDER — FINASTERIDE 5 MG/1
5 TABLET, FILM COATED ORAL DAILY
Qty: 90 TABLET | Refills: 1 | Status: SHIPPED | OUTPATIENT
Start: 2022-07-07

## 2022-07-07 NOTE — TELEPHONE ENCOUNTER
Rx refill for finasteride (PROSCAR) 5 mg tablet and tamsulosin (FLOMAX) 0 4 mg      Send to [address], [place] - [address]- 6/24/22 NOV- 11/29/22

## 2022-08-02 ENCOUNTER — TELEPHONE (OUTPATIENT)
Dept: INTERNAL MEDICINE CLINIC | Facility: CLINIC | Age: 72
End: 2022-08-02

## 2022-08-02 DIAGNOSIS — I10 ESSENTIAL HYPERTENSION: ICD-10-CM

## 2022-08-02 RX ORDER — CANDESARTAN 32 MG/1
32 TABLET ORAL DAILY
Qty: 90 TABLET | Refills: 1 | Status: SHIPPED | OUTPATIENT
Start: 2022-08-02

## 2022-08-02 NOTE — TELEPHONE ENCOUNTER
RX Refill for candesartan (ATACAND) 32 MG tablet      Send to [place] [address] [address] [facility] [place] - [facility]- 6/24/22 NOV- 11/29/22

## 2022-08-05 DIAGNOSIS — E78.00 HYPERCHOLESTEROLEMIA: ICD-10-CM

## 2022-08-05 RX ORDER — ROSUVASTATIN CALCIUM 20 MG/1
20 TABLET, COATED ORAL DAILY
Qty: 90 TABLET | Refills: 1 | Status: SHIPPED | OUTPATIENT
Start: 2022-08-05

## 2022-08-11 DIAGNOSIS — E11.9 TYPE 2 DIABETES MELLITUS WITHOUT COMPLICATION, WITHOUT LONG-TERM CURRENT USE OF INSULIN (HCC): ICD-10-CM

## 2022-09-30 DIAGNOSIS — N39.41 URGE INCONTINENCE: ICD-10-CM

## 2022-09-30 DIAGNOSIS — N13.8 BPH WITH OBSTRUCTION/LOWER URINARY TRACT SYMPTOMS: ICD-10-CM

## 2022-09-30 DIAGNOSIS — N40.1 BPH WITH OBSTRUCTION/LOWER URINARY TRACT SYMPTOMS: ICD-10-CM

## 2022-09-30 RX ORDER — OXYBUTYNIN CHLORIDE 10 MG/1
TABLET, EXTENDED RELEASE ORAL
Qty: 90 TABLET | Refills: 0 | Status: CANCELLED | OUTPATIENT
Start: 2022-09-30

## 2022-09-30 RX ORDER — FINASTERIDE 5 MG/1
5 TABLET, FILM COATED ORAL DAILY
Qty: 90 TABLET | Refills: 0 | Status: CANCELLED | OUTPATIENT
Start: 2022-09-30

## 2022-09-30 NOTE — TELEPHONE ENCOUNTER
Last ov 6/24/22  Next ov 11/29/22    Told patient to call [facility] in [employer] to refill Oxybutynin and Finasteride

## 2022-10-12 PROBLEM — Z00.00 MEDICARE ANNUAL WELLNESS VISIT, SUBSEQUENT: Status: RESOLVED | Noted: 2019-06-17 | Resolved: 2022-10-12

## 2022-10-22 ENCOUNTER — NURSE TRIAGE (OUTPATIENT)
Dept: OTHER | Facility: OTHER | Age: 72
End: 2022-10-22

## 2022-10-22 NOTE — TELEPHONE ENCOUNTER
1  Were you within 6 feet or less, for up to 15 minutes or more with a person that has a confirmed COVID-19 test? Tested positive today  2  What was the date of your exposure? No known exposure  3  Are you experiencing any symptoms attributed to the virus?  (Assess for SOB, cough, fever, difficulty breathing) runny nose, cough, body ache, no fever, scratchy throat  4  HIGH RISK: Do you have any history heart or lung conditions, weakened immune system, diabetes, Asthma, CHF, HIV, COPD, Chemo, renal failure, sickle cell, etc? Diabetes 2   5   VACCINE: "Have you gotten the COVID-19 vaccine?" If Yes ask: "Which one, how many shots, when did you get it?" 4 shots    Reason for Disposition  • [1] COVID-19 diagnosed by positive lab test (e g , PCR, rapid self-test kit) AND [2] mild symptoms (e g , cough, fever, others) AND [0] no complications or SOB    Protocols used: CORONAVIRUS (COVID-19) DIAGNOSED OR SUSPECTED-[facility]

## 2022-10-22 NOTE — TELEPHONE ENCOUNTER
Regarding: covid +/ [facility] / symptomatic   ----- Message from Anders Perdomo, [address] [place] [address] Karla Anaya sent at 10/22/2022 12:24 PM EDT -----  "I took a test today and it positive for covid, I have runny nose, body aches and cough, scratchy throat, no fever, started yesterday"

## 2022-11-15 DIAGNOSIS — D43.2 SUBEPENDYMOMA (HCC): Primary | ICD-10-CM

## 2022-11-22 ENCOUNTER — HOSPITAL ENCOUNTER (OUTPATIENT)
Dept: RADIOLOGY | Facility: HOSPITAL | Age: 72
Discharge: HOME | End: 2022-11-22

## 2022-11-22 ENCOUNTER — APPOINTMENT (OUTPATIENT)
Dept: LAB | Facility: CLINIC | Age: 72
End: 2022-11-22

## 2022-11-22 DIAGNOSIS — D43.2 SUBEPENDYMOMA (HCC): ICD-10-CM

## 2022-11-22 DIAGNOSIS — E11.41 TYPE 2 DIABETES MELLITUS WITH DIABETIC MONONEUROPATHY, WITHOUT LONG-TERM CURRENT USE OF INSULIN (HCC): ICD-10-CM

## 2022-11-22 LAB
ALBUMIN SERPL BCP-MCNC: 3.4 G/DL (ref 3.5–5)
ALP SERPL-CCNC: 64 U/L (ref 46–116)
ALT SERPL W P-5'-P-CCNC: 21 U/L (ref 12–78)
ANION GAP SERPL CALCULATED.3IONS-SCNC: 7 MMOL/L (ref 4–13)
AST SERPL W P-5'-P-CCNC: 11 U/L (ref 5–45)
BILIRUB SERPL-MCNC: 0.47 MG/DL (ref 0.2–1)
BUN SERPL-MCNC: 25 MG/DL (ref 5–25)
CALCIUM ALBUM COR SERPL-MCNC: 9.5 MG/DL (ref 8.3–10.1)
CALCIUM SERPL-MCNC: 9 MG/DL (ref 8.3–10.1)
CHLORIDE SERPL-SCNC: 110 MMOL/L (ref 96–108)
CHOLEST SERPL-MCNC: 111 MG/DL
CO2 SERPL-SCNC: 23 MMOL/L (ref 21–32)
CREAT SERPL-MCNC: 0.85 MG/DL (ref 0.6–1.3)
CREAT UR-MCNC: 150 MG/DL
ERYTHROCYTE [DISTWIDTH] IN BLOOD BY AUTOMATED COUNT: 14.2 % (ref 11.6–15.1)
EST. AVERAGE GLUCOSE BLD GHB EST-MCNC: 134 MG/DL
GFR SERPL CREATININE-BSD FRML MDRD: 87 ML/MIN/1.73SQ M
GLUCOSE P FAST SERPL-MCNC: 135 MG/DL (ref 65–99)
HBA1C MFR BLD: 6.3 %
HCT VFR BLD AUTO: 40.1 % (ref 36.5–49.3)
HDLC SERPL-MCNC: 38 MG/DL
HGB BLD-MCNC: 13 G/DL (ref 12–17)
LDLC SERPL CALC-MCNC: 28 MG/DL (ref 0–100)
MCH RBC QN AUTO: 29.3 PG (ref 26.8–34.3)
MCHC RBC AUTO-ENTMCNC: 32.4 G/DL (ref 31.4–37.4)
MCV RBC AUTO: 91 FL (ref 82–98)
MICROALBUMIN UR-MCNC: 13.6 MG/L (ref 0–20)
MICROALBUMIN/CREAT 24H UR: 9 MG/G CREATININE (ref 0–30)
NONHDLC SERPL-MCNC: 73 MG/DL
PLATELET # BLD AUTO: 225 THOUSANDS/UL (ref 149–390)
PMV BLD AUTO: 9.4 FL (ref 8.9–12.7)
POTASSIUM SERPL-SCNC: 3.9 MMOL/L (ref 3.5–5.3)
PROT SERPL-MCNC: 7.3 G/DL (ref 6.4–8.4)
RBC # BLD AUTO: 4.43 MILLION/UL (ref 3.88–5.62)
SODIUM SERPL-SCNC: 140 MMOL/L (ref 135–147)
TRIGL SERPL-MCNC: 224 MG/DL
WBC # BLD AUTO: 9.77 THOUSAND/UL (ref 4.31–10.16)

## 2022-11-22 RX ADMIN — GADOBUTROL 8 ML: 604.72 INJECTION INTRAVENOUS at 10:51

## 2022-11-28 ENCOUNTER — OFFICE VISIT (OUTPATIENT)
Dept: NEUROSURGERY | Facility: CLINIC | Age: 72
End: 2022-11-28

## 2022-11-28 VITALS
BODY MASS INDEX: 30.65 KG/M2 | SYSTOLIC BLOOD PRESSURE: 114 MMHG | RESPIRATION RATE: 16 BRPM | HEART RATE: 58 BPM | WEIGHT: 173 LBS | HEIGHT: 63 IN | TEMPERATURE: 97.6 F | DIASTOLIC BLOOD PRESSURE: 58 MMHG

## 2022-11-28 DIAGNOSIS — D43.2 SUBEPENDYMOMA (HCC): Primary | ICD-10-CM

## 2022-11-28 NOTE — ASSESSMENT & PLAN NOTE
Presents for annual surveillance of posterior fossa subependymoma  · Discovered incidentally in 2019 as part of work up for hearing loss  · Last evaluated with stable imaging on 11/22/2021  · Currently denies any headaches, visual disturbance, dizziness  · Exam is non-focal  Complains of new-onset tremors  Imaging:  · MRI brain w/wo, 11/22/2022: Once again identified is a mass within the inferior 4th ventricle/obex best seen on series 9 image 4  Allowing  Presents is in slice selection and technique, this appears grossly unchanged and is consistent with the presumed diagnosis of subependymoma  Plan:  · Reviewed imaging extensively with patient and wife  · Discussed that mass is unlikely cause of tremors and hearing loss  · Since imaging has been stable over the past 3 years, can extend surveillance out to 2 years  · Reviewed signs and symptoms of hydrocephalus that would prompt emergent evaluation such as headache, dizziness, blurred vision  · Follow up in 2 years with MRI brain w/wo

## 2022-11-29 ENCOUNTER — OFFICE VISIT (OUTPATIENT)
Dept: INTERNAL MEDICINE CLINIC | Facility: CLINIC | Age: 72
End: 2022-11-29

## 2022-11-29 VITALS
HEART RATE: 80 BPM | BODY MASS INDEX: 30.11 KG/M2 | HEIGHT: 64 IN | SYSTOLIC BLOOD PRESSURE: 114 MMHG | TEMPERATURE: 97.6 F | DIASTOLIC BLOOD PRESSURE: 70 MMHG | OXYGEN SATURATION: 97 % | WEIGHT: 176.4 LBS

## 2022-11-29 DIAGNOSIS — I42.0 DILATED CARDIOMYOPATHY (HCC): ICD-10-CM

## 2022-11-29 DIAGNOSIS — M47.816 SPONDYLOSIS OF LUMBAR REGION WITHOUT MYELOPATHY OR RADICULOPATHY: ICD-10-CM

## 2022-11-29 DIAGNOSIS — E78.00 HYPERCHOLESTEROLEMIA: ICD-10-CM

## 2022-11-29 DIAGNOSIS — N20.0 NEPHROLITHIASIS: ICD-10-CM

## 2022-11-29 DIAGNOSIS — I10 BENIGN ESSENTIAL HYPERTENSION: ICD-10-CM

## 2022-11-29 DIAGNOSIS — E11.41 TYPE 2 DIABETES MELLITUS WITH DIABETIC MONONEUROPATHY, WITHOUT LONG-TERM CURRENT USE OF INSULIN (HCC): ICD-10-CM

## 2022-11-29 DIAGNOSIS — H91.92 HEARING LOSS OF LEFT EAR, UNSPECIFIED HEARING LOSS TYPE: ICD-10-CM

## 2022-11-29 DIAGNOSIS — M25.512 ACUTE PAIN OF LEFT SHOULDER: ICD-10-CM

## 2022-11-29 DIAGNOSIS — N39.41 URGE INCONTINENCE: ICD-10-CM

## 2022-11-29 DIAGNOSIS — I10 ESSENTIAL HYPERTENSION: ICD-10-CM

## 2022-11-29 DIAGNOSIS — U07.1 COVID-19: ICD-10-CM

## 2022-11-29 DIAGNOSIS — K21.9 CHRONIC GERD: ICD-10-CM

## 2022-11-29 DIAGNOSIS — N13.8 BPH WITH OBSTRUCTION/LOWER URINARY TRACT SYMPTOMS: ICD-10-CM

## 2022-11-29 DIAGNOSIS — E78.2 MIXED HYPERLIPIDEMIA: ICD-10-CM

## 2022-11-29 DIAGNOSIS — Z12.5 SCREENING FOR PROSTATE CANCER: ICD-10-CM

## 2022-11-29 DIAGNOSIS — E11.9 TYPE 2 DIABETES MELLITUS WITHOUT COMPLICATION, WITHOUT LONG-TERM CURRENT USE OF INSULIN (HCC): Primary | ICD-10-CM

## 2022-11-29 DIAGNOSIS — D43.2 SUBEPENDYMOMA (HCC): ICD-10-CM

## 2022-11-29 DIAGNOSIS — N40.1 BPH WITH OBSTRUCTION/LOWER URINARY TRACT SYMPTOMS: ICD-10-CM

## 2022-11-29 DIAGNOSIS — Z00.00 ENCOUNTER FOR ANNUAL WELLNESS VISIT (AWV) IN MEDICARE PATIENT: ICD-10-CM

## 2022-11-29 RX ORDER — ROSUVASTATIN CALCIUM 20 MG/1
20 TABLET, COATED ORAL DAILY
Qty: 90 TABLET | Refills: 1 | Status: SHIPPED | OUTPATIENT
Start: 2022-11-29

## 2022-11-29 RX ORDER — OXYBUTYNIN CHLORIDE 10 MG/1
TABLET, EXTENDED RELEASE ORAL
Qty: 90 TABLET | Refills: 1 | Status: SHIPPED | OUTPATIENT
Start: 2022-11-29

## 2022-11-29 RX ORDER — TAMSULOSIN HYDROCHLORIDE 0.4 MG/1
0.4 CAPSULE ORAL
Qty: 90 CAPSULE | Refills: 1 | Status: SHIPPED | OUTPATIENT
Start: 2022-11-29

## 2022-11-29 RX ORDER — IBUPROFEN 600 MG/1
600 TABLET ORAL EVERY 8 HOURS PRN
Qty: 90 TABLET | Refills: 1 | Status: SHIPPED | OUTPATIENT
Start: 2022-11-29

## 2022-11-29 RX ORDER — SILDENAFIL 100 MG/1
100 TABLET, FILM COATED ORAL DAILY PRN
Qty: 3 TABLET | Refills: 5 | Status: SHIPPED | OUTPATIENT
Start: 2022-11-29

## 2022-11-29 RX ORDER — OMEPRAZOLE 40 MG/1
40 CAPSULE, DELAYED RELEASE ORAL DAILY
Qty: 90 CAPSULE | Refills: 1 | Status: SHIPPED | OUTPATIENT
Start: 2022-11-29

## 2022-11-29 RX ORDER — FINASTERIDE 5 MG/1
5 TABLET, FILM COATED ORAL DAILY
Qty: 90 TABLET | Refills: 1 | Status: SHIPPED | OUTPATIENT
Start: 2022-11-29

## 2022-11-29 RX ORDER — CANDESARTAN 32 MG/1
32 TABLET ORAL DAILY
Qty: 90 TABLET | Refills: 1 | Status: SHIPPED | OUTPATIENT
Start: 2022-11-29

## 2022-11-29 RX ORDER — AMLODIPINE BESYLATE 10 MG/1
10 TABLET ORAL DAILY
Qty: 90 TABLET | Refills: 1 | Status: SHIPPED | OUTPATIENT
Start: 2022-11-29

## 2022-11-29 NOTE — ASSESSMENT & PLAN NOTE
Patient has had no cardiac studies done within the past 6 years    Will schedule transthoracic echocardiogram to evaluate his history of dilated cardiomyopathy

## 2022-11-29 NOTE — ASSESSMENT & PLAN NOTE
Clinically stable  No significant change on recent MRI    Follow-up has been extended to 2 years now

## 2022-11-29 NOTE — ASSESSMENT & PLAN NOTE
Has been using ibuprofen with increased frequency    Recommendations were made to try to lessen the dependency on ibuprofen and to try to use Tylenol in place of ibuprofen on occasion as well as topical Voltaren

## 2022-11-29 NOTE — ASSESSMENT & PLAN NOTE
Recent COVID illness  Patient still has not recovered his sense of smell and taste    He has some residual postnasal drip and mild cough which has been nonproductive

## 2022-11-29 NOTE — ASSESSMENT & PLAN NOTE
Diabetes is under good control with combination linagliptin and metformin  Lab Results   Component Value Date    HGBA1C 6 3 (H) 11/22/2022

## 2022-11-29 NOTE — PROGRESS NOTES
Assessment and Plan:     Problem List Items Addressed This Visit    None  Visit Diagnoses     Type 2 diabetes mellitus without complication, without long-term current use of insulin ([facility] [address] )    -  Primary           Preventive health issues were discussed with patient, and age appropriate screening tests were ordered as noted in patient's After Visit Summary  Personalized health advice and appropriate referrals for health education or preventive services given if needed, as noted in patient's After Visit Summary  History of Present Illness:     Patient presents for a Medicare Wellness Visit    Patient presents to the office for follow-up visit  He is recovering from recent COVID 19 respiratory infection  He had lost his sense of smell and taste  His symptoms for primarily upper respiratory with cough, nasal congestion, fever over several days but has now improved  Home test was positive  At the present time he still has partial loss of taste and some persistent nonproductive cough with postnasal drip  He has had no issues with any chest pain, dyspnea, orthopnea, palpitations, peripheral edema  His nocturia is better with the use of oxybutynin  Recent labs were reviewed  Lipids are near goal   Total cholesterol 111, triglycerides 224, HDL cholesterol 38 and LDL cholesterol 28  Hemoglobin A1c was 6 3%  CMP with only minimal abnormalities essentially normal   CBC as well  Urine was negative for any microalbumin  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  Follow-up is planned now for 2 years  He is requesting evaluation for hearing aid  Patient Care Team:  Fer Sadler MD as PCP - Orion Mayorga MD as PCP - [facility]-MD Sierra Rodríguez MD as Endoscopist     Review of Systems:     Review of Systems   Constitutional: Negative      HENT: Positive for congestion, hearing loss and postnasal drip  Negative for sore throat  Eyes: Negative  Respiratory: Positive for cough  Negative for apnea, choking, chest tightness, shortness of breath, wheezing and stridor  Cardiovascular: Negative  Gastrointestinal: Negative  Genitourinary: Positive for difficulty urinating (Nocturia x2)  Musculoskeletal: Positive for arthralgias and back pain (Chronic low back pain)  Negative for joint swelling and neck pain  Neurological: Negative  Hematological: Negative  Psychiatric/Behavioral: Negative           Problem List:     Patient Active Problem List   Diagnosis   • Vitamin D insufficiency   • Type 2 diabetes mellitus with diabetic mononeuropathy, without long-term current use of insulin (HCC)   • Polyp of colon   • Overactive bladder   • Osteoarthritis of lumbar spine   • Hyperlipidemia   • Grade II internal hemorrhoids   • Dilated cardiomyopathy (HCC)   • Chronic GERD   • BPH with obstruction/lower urinary tract symptoms   • Bilateral carpal tunnel syndrome   • Benign essential hypertension   • Erectile dysfunction   • Screening for diabetic retinopathy   • Tinea pedis of right foot   • Primary osteoarthritis of left shoulder   • Sensorineural hearing loss (SNHL) of left ear with unrestricted hearing of right ear   • Subacromial bursitis of left shoulder joint   • Rotator cuff tendonitis, left   • 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[address] )   • Nephrolithiasis   • Onychomycosis of multiple toenails with type 2 diabetes mellitus ([address] )   • Chronic bilateral low back pain with bilateral sciatica      Past Medical and Surgical History:     Past Medical History:   Diagnosis Date   • Abnormal echocardiogram 09/15/2017   • Abnormal finding on MRI of brain 02/26/2020   • Arthritis     OSTEOARTHRITIS OF LUMBAR SPINE   • Brain tumor ([address] ) 02/26/2020   • Carpal tunnel syndrome    • COVID    • Diabetes mellitus ([address] )    • Elevated prostate specific antigen (PSA)    • Erectile dysfunction    • GERD (gastroesophageal reflux disease)    • H  pylori infection 07/17/2018   • History of BPH    • Hx of adenomatous colonic polyps    • Hyperlipidemia    • Hypertension    • Hypertension      Past Surgical History:   Procedure Laterality Date   • APPENDECTOMY     • CARPAL TUNNEL RELEASE     • COLONOSCOPY     • CYSTOSCOPY W/ DILATION OF BLADDER  01/09/2017    DR KILO LENZ, [facility]    • EGD AND COLONOSCOPY N/A 2/8/2018    Procedure: EGD with biopsy AND COLONOSCOPY with polypectomy with hemo clip application;  Surgeon: Pamela Patrick MD;  Location: [facility];   Service: Gastroenterology   • NEUROPLASTY / TRANSPOSITION MEDIAN NERVE AT CARPAL TUNNEL      LEFT & RIGHT      Family History:     Family History   Problem Relation Age of Onset   • Heart disease Mother    • Hypertension Mother    • Ulcers Father    • Stomach cancer Father       Social History:     Social History     Socioeconomic History   • Marital status: /Civil Union     Spouse name: Not on file   • Number of children: Not on file   • Years of education: Not on file   • Highest education level: Not on file   Occupational History   • Not on file   Tobacco Use   • Smoking status: Never   • Smokeless tobacco: Never   Vaping Use   • Vaping Use: Never used   Substance and Sexual Activity   • Alcohol use: No   • Drug use: No   • Sexual activity: Not on file   Other Topics Concern   • Not on file   Social History Narrative   • Not on file     Social Determinants of Health     Financial Resource Strain: Not on file   Food Insecurity: Not on file   Transportation Needs: Not on file   Physical Activity: Not on file   Stress: Not on file   Social Connections: Not on file   Intimate Partner Violence: Not on file   Housing Stability: Not on file      Medications and Allergies:     Current Outpatient Medications   Medication Sig Dispense Refill   • amLODIPine (NORVASC) 10 mg tablet Take 1 tablet (10 mg total) by mouth daily 90 tablet 1   • candesartan (ATACAND) 32 MG tablet Take 1 tablet (32 mg total) by mouth daily 90 tablet 1   • ciclopirox (PENLAC) 8 % solution Apply topically daily at bedtime (Patient not taking: Reported on 11/28/2022) 6 6 mL 6   • finasteride (PROSCAR) 5 mg tablet Take 1 tablet (5 mg total) by mouth daily 90 tablet 1   • ibuprofen (MOTRIN) 600 mg tablet Take 1 tablet (600 mg total) by mouth every 8 (eight) hours as needed for mild pain 90 tablet 0   • linaGLIPtin 5 MG TABS Take 5 mg by mouth daily 90 tablet 1   • metFORMIN (GLUCOPHAGE) 1000 MG tablet Take 1 tablet (1,000 mg total) by mouth 2 (two) times a day with meals 180 tablet 1   • methylPREDNISolone 4 MG tablet therapy pack Use as directed on package (Patient not taking: Reported on 11/28/2022) 21 each 0   • omeprazole (PriLOSEC) 40 MG capsule Take 1 capsule (40 mg total) by mouth daily 90 capsule 1   • oxybutynin (DITROPAN-XL) 10 MG 24 hr tablet TOME CATHY TABLETA TODOS LOS ESCOBAR 90 tablet 1   • rosuvastatin (CRESTOR) 20 MG tablet Take 1 tablet (20 mg total) by mouth daily 90 tablet 1   • sildenafil (VIAGRA) 100 mg tablet Take 1 tablet (100 mg total) by mouth daily as needed for erectile dysfunction 3 tablet 5   • tamsulosin (FLOMAX) 0 4 mg Take 1 capsule (0 4 mg total) by mouth daily with dinner 90 capsule 1     No current facility-administered medications for this visit       No Known Allergies   Immunizations:     Immunization History   Administered Date(s) Administered   • COVID-19 MODERNA VACC 0 5 ML IM 01/19/2021, 02/15/2021, 11/01/2021, 11/01/2021, 04/09/2022   • INFLUENZA 10/06/2018, 10/07/2021, 09/16/2022   • Influenza Split High Dose Preservative Free IM 11/30/2017, 10/06/2018   • Influenza, high dose seasonal 0 7 mL 10/21/2019, 10/12/2020, 09/16/2022   • Pneumococcal Conjugate 13-Valent 10/26/2018   • Pneumococcal Polysaccharide PPV23 02/18/2020      Health Maintenance:         Topic Date Due   • Colorectal Cancer Screening  02/08/2028   • Hepatitis C Screening  Completed         Topic Date Due   • Hepatitis B Vaccine (1 of 3 - 3-dose series) Never done      Medicare Screening Tests and Risk Assessments:     Martha Favre is here for his Subsequent Wellness visit  Health Risk Assessment:   Patient rates overall health as fair  Patient feels that their physical health rating is same  Patient is satisfied with their life  Eyesight was rated as same  Hearing was rated as slightly worse  Patient feels that their emotional and mental health rating is same  Patients states they are never, rarely angry  Patient states they are often unusually tired/fatigued  Pain experienced in the last 7 days has been some  Patient's pain rating has been 5/10  Patient states that he has experienced weight loss or gain in last 6 months  Depression Screening:   PHQ-2 Score: 0      Fall Risk Screening: In the past year, patient has experienced: no history of falling in past year      Home Safety:  Patient does not have trouble with stairs inside or outside of their home  Patient has working smoke alarms and has working carbon monoxide detector  Home safety hazards include: none  Nutrition:   Current diet is Regular  Medications:   Patient is not currently taking any over-the-counter supplements  Patient is able to manage medications  Activities of Daily Living (ADLs)/Instrumental Activities of Daily Living (IADLs):   Walk and transfer into and out of bed and chair?: Yes  Dress and groom yourself?: Yes    Bathe or shower yourself?: Yes    Feed yourself?  Yes  Do your laundry/housekeeping?: Yes  Manage your money, pay your bills and track your expenses?: Yes  Make your own meals?: No    Do your own shopping?: Yes    Previous Hospitalizations:   Any hospitalizations or ED visits within the last 12 months?: No      Advance Care Planning:   Living will: No    Durable POA for healthcare: No    Advanced directive: No    Advanced directive counseling given: Yes    Five wishes given: No    Patient declined ACP directive: Yes    End of Life Decisions reviewed with patient: Yes    Provider agrees with end of life decisions: Yes      Cognitive Screening:   Provider or family/friend/caregiver concerned regarding cognition?: No    PREVENTIVE SCREENINGS      Cardiovascular Screening:    General: Screening Not Indicated, History Lipid Disorder and Screening Current      Diabetes Screening:     General: Screening Not Indicated, History Diabetes and Screening Current      Colorectal Cancer Screening:     General: Screening Current      Prostate Cancer Screening:      Due for: PSA      Osteoporosis Screening:    General: Screening Not Indicated      Abdominal Aortic Aneurysm (AAA) Screening:    Risk factors include: age between 73-67 yo        Lung Cancer Screening:     General: Screening Not Indicated      Hepatitis C Screening:    General: Screening Current    Screening, Brief Intervention, and Referral to Treatment (SBIRT)    Screening  Typical number of drinks in a day: 0  Typical number of drinks in a week: 0  Interpretation: Low risk drinking behavior  Single Item Drug Screening:  How often have you used an illegal drug (including marijuana) or a prescription medication for non-medical reasons in the past year? never    Single Item Drug Screen Score: 0  Interpretation: Negative screen for possible drug use disorder    No results found  Physical Exam:     /70 (BP Location: Left arm, Patient Position: Sitting, Cuff Size: Standard)   Pulse 80   Temp 97 6 °F (36 4 °C) (Tympanic)   Ht 5' 3 66" (1 617 m)   Wt 80 kg (176 lb 6 4 oz)   SpO2 97%   BMI 30 60 kg/m²     Physical Exam  Vitals reviewed  Constitutional:       General: He is not in acute distress  Appearance: Normal appearance  He is well-developed and normal weight  He is not ill-appearing, toxic-appearing or diaphoretic  HENT:      Head: Normocephalic and atraumatic        Right Ear: Tympanic membrane, ear canal and external ear normal  There is no impacted cerumen  Left Ear: Tympanic membrane, ear canal and external ear normal  There is no impacted cerumen  Nose: Nose normal       Mouth/Throat:      Mouth: Mucous membranes are moist       Pharynx: Oropharynx is clear  Eyes:      General: No scleral icterus  Conjunctiva/sclera: Conjunctivae normal       Pupils: Pupils are equal, round, and reactive to light  Neck:      Vascular: No carotid bruit  Cardiovascular:      Rate and Rhythm: Normal rate and regular rhythm  Pulses: Normal pulses  no weak pulses          Dorsalis pedis pulses are 2+ on the right side and 2+ on the left side  Posterior tibial pulses are 2+ on the right side and 2+ on the left side  Heart sounds: Normal heart sounds  No murmur heard  Pulmonary:      Effort: Pulmonary effort is normal  No respiratory distress  Breath sounds: Normal breath sounds  Abdominal:      General: Abdomen is flat  Bowel sounds are normal  There is no distension  Palpations: Abdomen is soft  There is no mass  Tenderness: There is no abdominal tenderness  There is no right CVA tenderness, left CVA tenderness, guarding or rebound  Hernia: No hernia is present  Musculoskeletal:         General: No swelling, tenderness or deformity  Normal range of motion  Cervical back: Normal range of motion and neck supple  Right lower leg: No edema  Left lower leg: No edema  Feet:      Right foot:      Skin integrity: No ulcer, skin breakdown, erythema, warmth, callus or dry skin  Left foot:      Skin integrity: No ulcer, skin breakdown, erythema, warmth, callus or dry skin  Skin:     General: Skin is warm and dry  Capillary Refill: Capillary refill takes less than 2 seconds  Coloration: Skin is not jaundiced  Findings: No bruising, erythema or rash  Neurological:      General: No focal deficit present  Mental Status: He is alert and oriented to person, place, and time   Mental status is at baseline  Psychiatric:         Mood and Affect: Mood normal          Behavior: Behavior normal          Thought Content: Thought content normal          Judgment: Judgment normal       Patient's shoes and socks removed  Right Foot/Ankle   Right Foot Inspection  Skin Exam: skin normal and skin intact  No dry skin, no warmth, no callus, no erythema, no maceration, no abnormal color, no pre-ulcer, no ulcer and no callus  Toe Exam: ROM and strength within normal limits  No swelling, no tenderness, erythema and  no right toe deformity    Sensory   Vibration: intact  Proprioception: intact  Monofilament testing: intact    Vascular  Capillary refills: < 3 seconds  The right DP pulse is 2+  The right PT pulse is 2+  Left Foot/Ankle  Left Foot Inspection  Skin Exam: skin normal and skin intact  No dry skin, no warmth, no erythema, no maceration, normal color, no pre-ulcer, no ulcer and no callus  Toe Exam: ROM and strength within normal limits  No swelling, no tenderness, no erythema and no left toe deformity  Sensory   Vibration: intact  Proprioception: intact  Monofilament testing: intact    Vascular  Capillary refills: < 3 seconds  The left DP pulse is 2+  The left PT pulse is 2+       Assign Risk Category  No deformity present  No loss of protective sensation  No weak pulses  Risk: 0         Dangelo Hutton MD

## 2023-01-11 ENCOUNTER — APPOINTMENT (EMERGENCY)
Dept: CT IMAGING | Facility: HOSPITAL | Age: 73
End: 2023-01-11

## 2023-01-11 ENCOUNTER — HOSPITAL ENCOUNTER (EMERGENCY)
Facility: HOSPITAL | Age: 73
Discharge: HOME | End: 2023-01-11
Attending: EMERGENCY MEDICINE

## 2023-01-11 VITALS
RESPIRATION RATE: 18 BRPM | OXYGEN SATURATION: 99 % | TEMPERATURE: 97.9 F | WEIGHT: 176.37 LBS | SYSTOLIC BLOOD PRESSURE: 128 MMHG | BODY MASS INDEX: 30.6 KG/M2 | DIASTOLIC BLOOD PRESSURE: 70 MMHG | HEART RATE: 79 BPM

## 2023-01-11 DIAGNOSIS — L03.213 PRESEPTAL CELLULITIS OF RIGHT EYE: Primary | ICD-10-CM

## 2023-01-11 LAB
ALBUMIN SERPL BCP-MCNC: 3.5 G/DL (ref 3.5–5)
ALP SERPL-CCNC: 68 U/L (ref 46–116)
ALT SERPL W P-5'-P-CCNC: 20 U/L (ref 12–78)
ANION GAP SERPL CALCULATED.3IONS-SCNC: 8 MMOL/L (ref 4–13)
AST SERPL W P-5'-P-CCNC: 14 U/L (ref 5–45)
BASOPHILS # BLD AUTO: 0.04 THOUSANDS/ÂΜL (ref 0–0.1)
BASOPHILS NFR BLD AUTO: 1 % (ref 0–1)
BILIRUB SERPL-MCNC: 0.5 MG/DL (ref 0.2–1)
BUN SERPL-MCNC: 19 MG/DL (ref 5–25)
CALCIUM SERPL-MCNC: 8.8 MG/DL (ref 8.3–10.1)
CHLORIDE SERPL-SCNC: 103 MMOL/L (ref 96–108)
CO2 SERPL-SCNC: 27 MMOL/L (ref 21–32)
CREAT SERPL-MCNC: 0.88 MG/DL (ref 0.6–1.3)
EOSINOPHIL # BLD AUTO: 0.11 THOUSAND/ÂΜL (ref 0–0.61)
EOSINOPHIL NFR BLD AUTO: 1 % (ref 0–6)
ERYTHROCYTE [DISTWIDTH] IN BLOOD BY AUTOMATED COUNT: 13.9 % (ref 11.6–15.1)
GFR SERPL CREATININE-BSD FRML MDRD: 85 ML/MIN/1.73SQ M
GLUCOSE SERPL-MCNC: 123 MG/DL (ref 65–140)
HCT VFR BLD AUTO: 42.4 % (ref 36.5–49.3)
HGB BLD-MCNC: 14.1 G/DL (ref 12–17)
IMM GRANULOCYTES # BLD AUTO: 0.02 THOUSAND/UL (ref 0–0.2)
IMM GRANULOCYTES NFR BLD AUTO: 0 % (ref 0–2)
LYMPHOCYTES # BLD AUTO: 3.57 THOUSANDS/ÂΜL (ref 0.6–4.47)
LYMPHOCYTES NFR BLD AUTO: 42 % (ref 14–44)
MCH RBC QN AUTO: 29.2 PG (ref 26.8–34.3)
MCHC RBC AUTO-ENTMCNC: 33.3 G/DL (ref 31.4–37.4)
MCV RBC AUTO: 88 FL (ref 82–98)
MONOCYTES # BLD AUTO: 0.63 THOUSAND/ÂΜL (ref 0.17–1.22)
MONOCYTES NFR BLD AUTO: 7 % (ref 4–12)
NEUTROPHILS # BLD AUTO: 4.19 THOUSANDS/ÂΜL (ref 1.85–7.62)
NEUTS SEG NFR BLD AUTO: 49 % (ref 43–75)
NRBC BLD AUTO-RTO: 0 /100 WBCS
PLATELET # BLD AUTO: 223 THOUSANDS/UL (ref 149–390)
PMV BLD AUTO: 9.1 FL (ref 8.9–12.7)
POTASSIUM SERPL-SCNC: 4.1 MMOL/L (ref 3.5–5.3)
PROT SERPL-MCNC: 7.2 G/DL (ref 6.4–8.4)
RBC # BLD AUTO: 4.83 MILLION/UL (ref 3.88–5.62)
SODIUM SERPL-SCNC: 138 MMOL/L (ref 135–147)
WBC # BLD AUTO: 8.56 THOUSAND/UL (ref 4.31–10.16)

## 2023-01-11 RX ORDER — AMOXICILLIN AND CLAVULANATE POTASSIUM 875; 125 MG/1; MG/1
1 TABLET, FILM COATED ORAL ONCE
Status: COMPLETED | OUTPATIENT
Start: 2023-01-11 | End: 2023-01-11

## 2023-01-11 RX ORDER — AMOXICILLIN AND CLAVULANATE POTASSIUM 875; 125 MG/1; MG/1
1 TABLET, FILM COATED ORAL EVERY 12 HOURS
Qty: 20 TABLET | Refills: 0 | Status: SHIPPED | OUTPATIENT
Start: 2023-01-11 | End: 2023-01-21

## 2023-01-11 RX ADMIN — IOHEXOL 85 ML: 350 INJECTION, SOLUTION INTRAVENOUS at 13:50

## 2023-01-11 RX ADMIN — SODIUM CHLORIDE 500 ML: 0.9 INJECTION, SOLUTION INTRAVENOUS at 12:51

## 2023-01-11 RX ADMIN — AMOXICILLIN AND CLAVULANATE POTASSIUM 1 TABLET: 875; 125 TABLET, FILM COATED ORAL at 14:47

## 2023-01-11 NOTE — ED PROVIDER NOTES
History  Chief Complaint   Patient presents with   • Eye Pain     Pt reports pain and swelling of R eye  Pt had similar issue 5 years ago  Denies injury  This is a 42-year-old male patient who presents with swelling to the right maxillary area this started approximately 2 to 3 days ago  It is not associated with blurred vision double vision no pain with extraocular motion     No fever chills headache no cough congestion or sore throat no nausea vomiting diarrhea abdominal pain  Nothing makes it better or worse he tried an over-the-counter  The nursing note stated that he had something similar 5 years ago but at that time according to the patient he had a written down told me that he had a nerve palsy left side of his face that is not the case now  There is no droop or slurred speech  Patient does wear top dentures and the anchor tooth on the right upper side is tender  Patient is alert nontoxic without a stiff neck or rash  Differential diagnose include not limited to dental abscess, facial cellulitis, orbital cellulitis less likely, preseptal cellulitis less likely          Prior to Admission Medications   Prescriptions Last Dose Informant Patient Reported? Taking?    amLODIPine (NORVASC) 10 mg tablet   No No   Sig: Take 1 tablet (10 mg total) by mouth daily   candesartan (ATACAND) 32 MG tablet   No No   Sig: Take 1 tablet (32 mg total) by mouth daily   finasteride (PROSCAR) 5 mg tablet   No No   Sig: Take 1 tablet (5 mg total) by mouth daily   ibuprofen (MOTRIN) 600 mg tablet   No No   Sig: Take 1 tablet (600 mg total) by mouth every 8 (eight) hours as needed for mild pain   linaGLIPtin 5 MG TABS   No No   Sig: Take 5 mg by mouth daily   metFORMIN (GLUCOPHAGE) 1000 MG tablet   No No   Sig: Take 1 tablet (1,000 mg total) by mouth 2 (two) times a day with meals   omeprazole (PriLOSEC) 40 MG capsule   No No   Sig: Take 1 capsule (40 mg total) by mouth daily   oxybutynin (DITROPAN-XL) 10 MG 24 hr tablet   No No   Sig: TOME CATHY TABLETA TODOS LOS ESCOBAR   rosuvastatin (CRESTOR) 20 MG tablet   No No   Sig: Take 1 tablet (20 mg total) by mouth daily   sildenafil (VIAGRA) 100 mg tablet   No No   Sig: Take 1 tablet (100 mg total) by mouth daily as needed for erectile dysfunction   tamsulosin (FLOMAX) 0 4 mg   No No   Sig: Take 1 capsule (0 4 mg total) by mouth daily with dinner      Facility-Administered Medications: None       Past Medical History:   Diagnosis Date   • Abnormal echocardiogram 09/15/2017   • Abnormal finding on MRI of brain 02/26/2020   • Arthritis     OSTEOARTHRITIS OF LUMBAR SPINE   • Brain tumor ([facility] [address] ) 02/26/2020   • Carpal tunnel syndrome    • COVID    • Diabetes mellitus ([facility] [address] )    • Elevated prostate specific antigen (PSA)    • Erectile dysfunction    • GERD (gastroesophageal reflux disease)    • H  pylori infection 07/17/2018   • History of BPH    • Hx of adenomatous colonic polyps    • Hyperlipidemia    • Hypertension    • Hypertension        Past Surgical History:   Procedure Laterality Date   • APPENDECTOMY     • CARPAL TUNNEL RELEASE     • COLONOSCOPY     • CYSTOSCOPY W/ DILATION OF BLADDER  01/09/2017    DR KILO LENZ, [facility]    • EGD AND COLONOSCOPY N/A 2/8/2018    Procedure: EGD with biopsy AND COLONOSCOPY with polypectomy with hemo clip application;  Surgeon: Johanny Sims MD;  Location: [facility]; Service: Gastroenterology   • NEUROPLASTY / TRANSPOSITION MEDIAN NERVE AT CARPAL TUNNEL      LEFT & RIGHT       Family History   Problem Relation Age of Onset   • Heart disease Mother    • Hypertension Mother    • Ulcers Father    • Stomach cancer Father      I have reviewed and agree with the history as documented      E-Cigarette/Vaping   • E-Cigarette Use Never User      E-Cigarette/Vaping Substances   • Nicotine No    • THC No    • CBD No    • Flavoring No    • Other No    • Unknown No      Social History     Tobacco Use   • Smoking status: Never   • Smokeless tobacco: Never   Vaping Use   • Vaping Use: Never used   Substance Use Topics   • Alcohol use: No   • Drug use: No       Review of Systems   Constitutional: Negative for chills, diaphoresis, fatigue and fever  HENT: Positive for facial swelling  Negative for congestion, dental problem, drooling, ear discharge, ear pain, hearing loss, mouth sores, nosebleeds, postnasal drip, rhinorrhea, sinus pressure, sinus pain, sneezing, sore throat, tinnitus, trouble swallowing and voice change  Eyes: Negative for photophobia, pain, discharge and visual disturbance  Respiratory: Negative for cough, choking, chest tightness, shortness of breath and wheezing  Cardiovascular: Negative for chest pain and palpitations  Gastrointestinal: Negative for abdominal distention, abdominal pain, diarrhea and vomiting  Genitourinary: Negative for dysuria, flank pain, frequency and hematuria  Musculoskeletal: Negative for arthralgias, back pain, gait problem and joint swelling  Skin: Negative for color change and rash  Neurological: Negative for dizziness, seizures, syncope and headaches  Psychiatric/Behavioral: Negative for behavioral problems and confusion  The patient is not nervous/anxious  All other systems reviewed and are negative        Physical Exam  Physical Exam    Vital Signs  ED Triage Vitals [01/11/23 1157]   Temperature Pulse Respirations Blood Pressure SpO2   97 9 °F (36 6 °C) 79 18 128/70 99 %      Temp Source Heart Rate Source Patient Position - Orthostatic VS BP Location FiO2 (%)   Oral Monitor Sitting Right arm --      Pain Score       --           Vitals:    01/11/23 1157   BP: 128/70   Pulse: 79   Patient Position - Orthostatic VS: Sitting         Visual Acuity      ED Medications  Medications   sodium chloride 0 9 % bolus 500 mL (0 mL Intravenous Stopped 1/11/23 1351)   iohexol (OMNIPAQUE) 350 MG/ML injection (SINGLE-DOSE) 85 mL (85 mL Intravenous Given 1/11/23 1350)       Diagnostic Studies  Results Reviewed     Procedure Component Value Units Date/Time    Comprehensive metabolic panel [[id number]] Collected: 01/11/23 1251    Lab Status: Final result Specimen: Blood from Arm, Right Updated: 01/11/23 1336     Sodium 138 mmol/L      Potassium 4 1 mmol/L      Chloride 103 mmol/L      CO2 27 mmol/L      ANION GAP 8 mmol/L      BUN 19 mg/dL      Creatinine 0 88 mg/dL      Glucose 123 mg/dL      Calcium 8 8 mg/dL      AST 14 U/L      ALT 20 U/L      Alkaline Phosphatase 68 U/L      Total Protein 7 2 g/dL      Albumin 3 5 g/dL      Total Bilirubin 0 50 mg/dL      eGFR 85 ml/min/1 73sq m     Narrative:      National Kidney Disease Foundation guidelines for Chronic Kidney Disease (CKD):   •  Stage 1 with normal or high GFR (GFR > 90 mL/min/1 73 square meters)  •  Stage 2 Mild CKD (GFR = 60-89 mL/min/1 73 square meters)  •  Stage 3A Moderate CKD (GFR = 45-59 mL/min/1 73 square meters)  •  Stage 3B Moderate CKD (GFR = 30-44 mL/min/1 73 square meters)  •  Stage 4 Severe CKD (GFR = 15-29 mL/min/1 73 square meters)  •  Stage 5 End Stage CKD (GFR <15 mL/min/1 73 square meters)  Note: GFR calculation is accurate only with a steady state creatinine    CBC and differential [[id number]] Collected: 01/11/23 1251    Lab Status: Final result Specimen: Blood from Arm, Right Updated: 01/11/23 1257     WBC 8 56 Thousand/uL      RBC 4 83 Million/uL      Hemoglobin 14 1 g/dL      Hematocrit 42 4 %      MCV 88 fL      MCH 29 2 pg      MCHC 33 3 g/dL      RDW 13 9 %      MPV 9 1 fL      Platelets 015 Thousands/uL      nRBC 0 /100 WBCs      Neutrophils Relative 49 %      Immat GRANS % 0 %      Lymphocytes Relative 42 %      Monocytes Relative 7 %      Eosinophils Relative 1 %      Basophils Relative 1 %      Neutrophils Absolute 4 19 Thousands/µL      Immature Grans Absolute 0 02 Thousand/uL      Lymphocytes Absolute 3 57 Thousands/µL      Monocytes Absolute 0 63 Thousand/µL      Eosinophils Absolute 0 11 Thousand/µL      Basophils Absolute 0 04 Thousands/µL                  CT soft tissue neck with contrast   Final Result by Raisa Abraham MD (01/11 8859)      Right periorbital preseptal cellulitis  No abscess  No postseptal inflammatory changes  Workstation performed: [id number]                    Procedures  Procedures         ED Course                               SBIRT 22yo+    Flowsheet Row Most Recent Value   SBIRT (23 yo +)    In order to provide better care to our patients, we are screening all of our patients for alcohol and drug use  Would it be okay to ask you these screening questions? No Filed at: 01/11/2023 1240                    Medical Decision Making  66-year-old male patient presented with swollen and tender pain just below his right eye x2 to 3 days with concern for orbital cellulitis versus preseptal cellulitis, dental infection, sinus infection  He is nontoxic in no acute distress  No fever chills headache blurred vision double vision no pain with extraocular motion that is intact no cough transient sore throat no neck pain or stiffness no rash no involvement of the right eye proper  Past medical history below  • Abnormal echocardiogram 09/15/2017  • Abnormal finding on MRI of brain 02/26/2020  • Arthritis    OSTEOARTHRITIS OF LUMBAR SPINE  • Brain tumor ([facility] [address] ) 02/26/2020  • Carpal tunnel syndrome   • COVID   • Diabetes mellitus ([address] )   • Elevated prostate specific antigen (PSA)   • Erectile dysfunction   • GERD (gastroesophageal reflux disease)   • H  pylori infection 07/17/2018  • History of BPH   • Hx of adenomatous colonic polyps   • Hyperlipidemia   • Hypertension   • Hypertension   All stable and is not when he is here for      Preseptal cellulitis of right eye: acute illness or injury     Details: Confirmed with CAT scan using joint decision-making the patient and I decided that he would rather try outpatient therapy and come back for worsening symptoms  Amount and/or Complexity of Data Reviewed  Independent Historian: spouse     Details: And patient shared in discussion and joint decision making  External Data Reviewed: notes  Details: From previous visits to help solidify past medical history  Labs: ordered  Decision-making details documented in ED Course  Details: All labs reviewed  Radiology: ordered  Decision-making details documented in ED Course  Details: CAT scan results reviewed by this provider  Discussion of management or test interpretation with external provider(s): Patient presents nontoxic no acute distress all findings were discussed with the patient and wife regarding preseptal cellulitis using joint decision-making patient and wife prefer that he go home on oral antibiotics  He will be placed on Augmentin for 10 days they were told in detail to return for any worsening symptoms questions comments or concerns and follow-up with the family doctor this week    Risk  Prescription drug management  Risk Details: Preseptal cellulitis per CAT scan all labs and studies reviewed outpatient Augmentin twice daily x10 days        Disposition  Final diagnoses:   Preseptal cellulitis of right eye     Time reflects when diagnosis was documented in both MDM as applicable and the Disposition within this note     Time User Action Codes Description Comment    1/11/2023  2:17 PM [address] [S23 [id number]] Preseptal cellulitis of right eye       ED Disposition     ED Disposition   Discharge    Condition   Stable    Date/Time   Wed Jan 11, 2023  2:17 PM    Comment   Andriy Breen discharge to home/self care                 Follow-up Information     Follow up With Specialties Details Why [address], MD Internal Medicine Schedule an appointment as soon as possible for a visit   [address]  [phone]            Patient's Medications   Discharge Prescriptions    AMOXICILLIN-CLAVULANATE (AUGMENTIN) 875-125 MG PER TABLET    Take 1 tablet by mouth every 12 (twelve) hours for 10 days       Start Date: 1/11/2023 End Date: 1/21/2023       Order Dose: 1 tablet       Quantity: 20 tablet    Refills: 0       No discharge procedures on file      PDMP Review     None          ED Provider  Electronically Signed by           Luiz De La Vega PA-C  01/11/23 5651

## 2023-01-11 NOTE — DISCHARGE INSTRUCTIONS
Carlee tienes celulitis preseptal  Probaremos antibióticos orales  Vaya a la farmacia y recoja svitlana antibióticos  Regrese de inmediato si los síntomas [place], fiebre, escalofríos o cualquier pregunta, comentario o inquietud  Si los antibióticos no funcionan, será necesario ingresarlo      Seguimiento con romeo médico de kinjal para volver a revisar esta semana

## 2023-01-20 ENCOUNTER — TELEPHONE (OUTPATIENT)
Dept: INTERNAL MEDICINE CLINIC | Facility: OTHER | Age: 73
End: 2023-01-20

## 2023-01-20 NOTE — TELEPHONE ENCOUNTER
Scheduled diabetic eye exam [facility] 2/24/2023 at 1130 am  Pt is to wear a mask.

## 2023-01-28 PROBLEM — Z00.00 ENCOUNTER FOR ANNUAL WELLNESS VISIT (AWV) IN MEDICARE PATIENT: Status: RESOLVED | Noted: 2022-11-29 | Resolved: 2023-01-28

## 2023-02-06 ENCOUNTER — TELEPHONE (OUTPATIENT)
Dept: INTERNAL MEDICINE CLINIC | Facility: CLINIC | Age: 73
End: 2023-02-06

## 2023-02-06 NOTE — TELEPHONE ENCOUNTER
Juli Santiago who does Authorizations with our office wanted to verify if you wanted patient to get a stress test done or echo complete done?  Call back number is [phone]

## 2023-02-08 NOTE — TELEPHONE ENCOUNTER
Dr Monster Manning returned call from [facility]  Patient does not need Stress Test, only Echo

## 2023-02-15 ENCOUNTER — TELEPHONE (OUTPATIENT)
Dept: INTERNAL MEDICINE CLINIC | Facility: CLINIC | Age: 73
End: 2023-02-15

## 2023-02-24 ENCOUNTER — DIABETIC EYE EXAM (OUTPATIENT)
Dept: URBAN - METROPOLITAN AREA CLINIC 6 | Facility: CLINIC | Age: 73
End: 2023-02-24

## 2023-02-24 DIAGNOSIS — E11.9: ICD-10-CM

## 2023-02-24 DIAGNOSIS — H25.813: ICD-10-CM

## 2023-02-24 LAB
LEFT EYE DIABETIC RETINOPATHY: NORMAL
RIGHT EYE DIABETIC RETINOPATHY: NORMAL
SEVERITY (EYE EXAM): NORMAL

## 2023-02-24 PROCEDURE — 92014 COMPRE OPH EXAM EST PT 1/>: CPT

## 2023-02-24 ASSESSMENT — TONOMETRY
OS_IOP_MMHG: 16
OD_IOP_MMHG: 16

## 2023-02-24 ASSESSMENT — VISUAL ACUITY
OD_CC: 20/30
OS_CC: 20/30

## 2023-02-27 ENCOUNTER — TELEPHONE (OUTPATIENT)
Dept: ADMINISTRATIVE | Facility: OTHER | Age: 73
End: 2023-02-27

## 2023-02-27 NOTE — TELEPHONE ENCOUNTER
----- Message from [facility] sent at 2/27/2023  7:52 AM EST -----  02/27/23 7:53 AM    Hello, our patient Alin Smith has had Diabetic Eye Exam completed/performed  Please assist in updating the patient chart by making an External outreach to [facility] facility located in [place]  The date of service is 2/24/2023      Thank you,  [facility]

## 2023-02-27 NOTE — LETTER
Diabetic Eye Exam Form    Date Requested: 23  Patient: Nathan James  Patient : 1950   Referring Provider: Andrea Steiner MD      DIABETIC Eye Exam Date _______________________________      Type of Exam MUST be documented for Diabetic Eye Exams  Please CHECK ONE  Retinal Exam       Dilated Retinal Exam       OCT       Optomap-Iris Exam      Fundus Photography       Left Eye - Please check Retinopathy or No Retinopathy        Exam did show retinopathy    Exam did not show retinopathy       Right Eye - Please check Retinopathy or No Retinopathy       Exam did show retinopathy    Exam did not show retinopathy       Comments DOS: 2023    Practice Providing Exam ______________________________________________    Exam Performed By (print name) _______________________________________      Provider Signature ___________________________________________________      These reports are needed for  compliance  Please fax this completed form and a copy of the Diabetic Eye Exam report to our office located at [place] [address] as soon as possible via Fax [phone] [place]: Phone [phone]  We thank you for your assistance in treating our mutual patient

## 2023-02-28 NOTE — TELEPHONE ENCOUNTER
Upon review of the In Basket request and the patient's chart, initial outreach has been made via fax to facility  Please see Contacts section for details       Thank you  Radhames Interiano

## 2023-03-03 ENCOUNTER — RX CHECK (OUTPATIENT)
Dept: URBAN - METROPOLITAN AREA CLINIC 6 | Facility: CLINIC | Age: 73
End: 2023-03-03

## 2023-03-03 DIAGNOSIS — H52.13: ICD-10-CM

## 2023-03-03 PROCEDURE — 92015 DETERMINE REFRACTIVE STATE: CPT

## 2023-03-03 ASSESSMENT — VISUAL ACUITY
OD_SC: 20/50-1
OS_SC: 20/200-1

## 2023-03-07 NOTE — TELEPHONE ENCOUNTER
Upon review of the In Basket request we were able to locate, review, and update the patient chart as requested for Diabetic Eye Exam     Any additional questions or concerns should be emailed to the Practice Liaisons via the appropriate education email address, please do not reply via In Basket      Thank you  Rickie Reeves

## 2023-03-11 DIAGNOSIS — K21.9 CHRONIC GERD: ICD-10-CM

## 2023-03-11 DIAGNOSIS — I10 BENIGN ESSENTIAL HYPERTENSION: ICD-10-CM

## 2023-03-13 RX ORDER — AMLODIPINE BESYLATE 10 MG/1
10 TABLET ORAL DAILY
Qty: 90 TABLET | Refills: 1 | Status: SHIPPED | OUTPATIENT
Start: 2023-03-13

## 2023-03-13 RX ORDER — OMEPRAZOLE 40 MG/1
40 CAPSULE, DELAYED RELEASE ORAL DAILY
Qty: 90 CAPSULE | Refills: 1 | Status: SHIPPED | OUTPATIENT
Start: 2023-03-13

## 2023-05-30 ENCOUNTER — RA CDI HCC (OUTPATIENT)
Dept: OTHER | Facility: HOSPITAL | Age: 73
End: 2023-05-30

## 2023-05-30 NOTE — PROGRESS NOTES
[facility]  coding opportunities       Chart reviewed, no opportunity found: CHART REVIEWED, NO OPPORTUNITY FOUND        Patients Insurance     Medicare Insurance: [facility] Advantage

## 2023-06-03 ENCOUNTER — APPOINTMENT (OUTPATIENT)
Dept: LAB | Facility: HOSPITAL | Age: 73
End: 2023-06-03
Payer: COMMERCIAL

## 2023-06-03 DIAGNOSIS — N13.8 BPH WITH OBSTRUCTION/LOWER URINARY TRACT SYMPTOMS: ICD-10-CM

## 2023-06-03 DIAGNOSIS — I10 ESSENTIAL HYPERTENSION: ICD-10-CM

## 2023-06-03 DIAGNOSIS — N20.0 NEPHROLITHIASIS: ICD-10-CM

## 2023-06-03 DIAGNOSIS — I42.0 DILATED CARDIOMYOPATHY (HCC): ICD-10-CM

## 2023-06-03 DIAGNOSIS — I10 BENIGN ESSENTIAL HYPERTENSION: ICD-10-CM

## 2023-06-03 DIAGNOSIS — E11.9 TYPE 2 DIABETES MELLITUS WITHOUT COMPLICATION, WITHOUT LONG-TERM CURRENT USE OF INSULIN (HCC): ICD-10-CM

## 2023-06-03 DIAGNOSIS — E78.00 HYPERCHOLESTEROLEMIA: ICD-10-CM

## 2023-06-03 DIAGNOSIS — Z12.5 SCREENING FOR PROSTATE CANCER: ICD-10-CM

## 2023-06-03 DIAGNOSIS — N40.1 BPH WITH OBSTRUCTION/LOWER URINARY TRACT SYMPTOMS: ICD-10-CM

## 2023-06-03 LAB
ALBUMIN SERPL BCP-MCNC: 4.1 G/DL (ref 3.5–5)
ALP SERPL-CCNC: 53 U/L (ref 34–104)
ALT SERPL W P-5'-P-CCNC: 14 U/L (ref 7–52)
ANION GAP SERPL CALCULATED.3IONS-SCNC: 5 MMOL/L (ref 4–13)
AST SERPL W P-5'-P-CCNC: 15 U/L (ref 13–39)
BILIRUB SERPL-MCNC: 0.6 MG/DL (ref 0.2–1)
BUN SERPL-MCNC: 27 MG/DL (ref 5–25)
CALCIUM SERPL-MCNC: 9.4 MG/DL (ref 8.4–10.2)
CHLORIDE SERPL-SCNC: 105 MMOL/L (ref 96–108)
CHOLEST SERPL-MCNC: 111 MG/DL
CO2 SERPL-SCNC: 25 MMOL/L (ref 21–32)
CREAT SERPL-MCNC: 0.77 MG/DL (ref 0.6–1.3)
CREAT UR-MCNC: 205 MG/DL
ERYTHROCYTE [DISTWIDTH] IN BLOOD BY AUTOMATED COUNT: 14 % (ref 11.6–15.1)
EST. AVERAGE GLUCOSE BLD GHB EST-MCNC: 137 MG/DL
GFR SERPL CREATININE-BSD FRML MDRD: 90 ML/MIN/1.73SQ M
GLUCOSE P FAST SERPL-MCNC: 128 MG/DL (ref 65–99)
HBA1C MFR BLD: 6.4 %
HCT VFR BLD AUTO: 41.3 % (ref 36.5–49.3)
HDLC SERPL-MCNC: 38 MG/DL
HGB BLD-MCNC: 13.5 G/DL (ref 12–17)
LDLC SERPL CALC-MCNC: 28 MG/DL (ref 0–100)
MCH RBC QN AUTO: 29.3 PG (ref 26.8–34.3)
MCHC RBC AUTO-ENTMCNC: 32.7 G/DL (ref 31.4–37.4)
MCV RBC AUTO: 90 FL (ref 82–98)
MICROALBUMIN UR-MCNC: 15.7 MG/L (ref 0–20)
MICROALBUMIN/CREAT 24H UR: 8 MG/G CREATININE (ref 0–30)
NONHDLC SERPL-MCNC: 73 MG/DL
PLATELET # BLD AUTO: 226 THOUSANDS/UL (ref 149–390)
PMV BLD AUTO: 9.7 FL (ref 8.9–12.7)
POTASSIUM SERPL-SCNC: 4.2 MMOL/L (ref 3.5–5.3)
PROT SERPL-MCNC: 7.1 G/DL (ref 6.4–8.4)
PSA SERPL-MCNC: 0.46 NG/ML (ref 0–4)
RBC # BLD AUTO: 4.6 MILLION/UL (ref 3.88–5.62)
SODIUM SERPL-SCNC: 135 MMOL/L (ref 135–147)
TRIGL SERPL-MCNC: 225 MG/DL
WBC # BLD AUTO: 9.5 THOUSAND/UL (ref 4.31–10.16)

## 2023-06-03 PROCEDURE — 82043 UR ALBUMIN QUANTITATIVE: CPT

## 2023-06-03 PROCEDURE — 80053 COMPREHEN METABOLIC PANEL: CPT

## 2023-06-03 PROCEDURE — G0103 PSA SCREENING: HCPCS

## 2023-06-03 PROCEDURE — 82570 ASSAY OF URINE CREATININE: CPT

## 2023-06-03 PROCEDURE — 80061 LIPID PANEL: CPT

## 2023-06-03 PROCEDURE — 85027 COMPLETE CBC AUTOMATED: CPT

## 2023-06-03 PROCEDURE — 36415 COLL VENOUS BLD VENIPUNCTURE: CPT

## 2023-06-03 PROCEDURE — 83036 HEMOGLOBIN GLYCOSYLATED A1C: CPT

## 2023-06-06 ENCOUNTER — OFFICE VISIT (OUTPATIENT)
Dept: INTERNAL MEDICINE CLINIC | Facility: CLINIC | Age: 73
End: 2023-06-06
Payer: COMMERCIAL

## 2023-06-06 VITALS
SYSTOLIC BLOOD PRESSURE: 120 MMHG | DIASTOLIC BLOOD PRESSURE: 66 MMHG | TEMPERATURE: 97.7 F | BODY MASS INDEX: 30.58 KG/M2 | HEART RATE: 75 BPM | OXYGEN SATURATION: 98 % | WEIGHT: 172.6 LBS | HEIGHT: 63 IN

## 2023-06-06 DIAGNOSIS — E11.41 TYPE 2 DIABETES MELLITUS WITH DIABETIC MONONEUROPATHY, WITHOUT LONG-TERM CURRENT USE OF INSULIN (HCC): ICD-10-CM

## 2023-06-06 DIAGNOSIS — E78.1 PURE HYPERTRIGLYCERIDEMIA: ICD-10-CM

## 2023-06-06 DIAGNOSIS — D43.2 SUBEPENDYMOMA (HCC): ICD-10-CM

## 2023-06-06 DIAGNOSIS — N40.1 BPH WITH OBSTRUCTION/LOWER URINARY TRACT SYMPTOMS: ICD-10-CM

## 2023-06-06 DIAGNOSIS — N13.8 BPH WITH OBSTRUCTION/LOWER URINARY TRACT SYMPTOMS: ICD-10-CM

## 2023-06-06 DIAGNOSIS — G25.0 BENIGN ESSENTIAL TREMOR: ICD-10-CM

## 2023-06-06 DIAGNOSIS — I42.0 DILATED CARDIOMYOPATHY (HCC): ICD-10-CM

## 2023-06-06 DIAGNOSIS — I10 BENIGN ESSENTIAL HYPERTENSION: Primary | ICD-10-CM

## 2023-06-06 PROBLEM — E55.9 VITAMIN D INSUFFICIENCY: Status: RESOLVED | Noted: 2017-12-21 | Resolved: 2023-06-06

## 2023-06-06 PROBLEM — K63.5 POLYP OF COLON: Status: RESOLVED | Noted: 2017-11-30 | Resolved: 2023-06-06

## 2023-06-06 PROCEDURE — 99214 OFFICE O/P EST MOD 30 MIN: CPT | Performed by: INTERNAL MEDICINE

## 2023-06-06 NOTE — PROGRESS NOTES
Name: Pipo Mustafa      : 1950      MRN: [id number]  Encounter Provider: Viridiana Cherry MD  Encounter Date: 2023   Encounter department: [address] [place] [address]  Benign essential hypertension  Assessment & Plan:  Blood pressure is stable on current medications amlodipine 10 mg and candesartan 32 mg  Orders:  -     CBC; Future; Expected date: 2023  -     Comprehensive metabolic panel; Future; Expected date: 2023    2  BPH with obstruction/lower urinary tract symptoms  Assessment & Plan:  Continues on tamsulosin at at bedtime  He does experience occasional difficulty passing his urine but it should be noted he is also on oxybutynin XL 10 mg  We discussed discontinuing this medication but the patient felt it was better to control his nocturnal frequency      3  Pure hypertriglyceridemia  Assessment & Plan:  Continues on rosuvastatin 20 mg  Follow-up lipid profile in 6 months    Orders:  -     Lipid panel; Future; Expected date: 2023    4  Dilated cardiomyopathy [facility])  Assessment & Plan:  Patient has been largely asymptomatic  His insurance did not approve his echocardiogram    Orders:  -     CBC; Future; Expected date: 2023  -     Comprehensive metabolic panel; Future; Expected date: 2023  -     Hemoglobin A1C; Future; Expected date: 2023  -     Lipid panel; Future; Expected date: 2023    5  Type 2 diabetes mellitus with diabetic mononeuropathy, without long-term current use of insulin (HCC)  Assessment & Plan:    Lab Results   Component Value Date    HGBA1C 6 4 (H) 2023   Hemoglobin A1c indicative of good glycemic control at 6 4%  No change in current medications  We will continue metformin and linagliptin    Orders:  -     CBC; Future; Expected date: 2023  -     Comprehensive metabolic panel;  Future; Expected date: 2023  -     Hemoglobin A1C; Future; Expected date: 11/27/2023  -     Lipid panel; Future; Expected date: 11/27/2023  -     Albumin / creatinine urine ratio; Future; Expected date: 11/27/2023    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[address] )    7  Benign essential tremor  Assessment & Plan:  Patient prefers to monitor at this time  We had a long discussion regarding medications that could be utilized and after considering his options the patient would prefer to defer           Subjective      Patient presents to the office for a follow-up visit for type 2 diabetes, hypertension, dyslipidemia, history of dilated cardiomyopathy  He also has a history of a subependymoma which is being monitored on a yearly basis  He has no complaints of any headaches or any other difficulties, vision issues, balance issues, etc  his appetite is good  Weight is stable  He is experienced no chest pains, palpitations, lightheadedness, orthopnea, PND, peripheral edema  His insurance would not approve a follow-up echocardiogram   He had some recent labs which were reviewed  His hemoglobin A1c is 6 4%  His PSA is normal   He has no significant albuminuria  His CBC is normal   His CMP is significant only for an elevated fasting glucose and a serum bicarbonate of 27  His lipid panel is near goal   Triglycerides are minimally elevated at 225  His total cholesterol is 111  HDL cholesterol is 38 and LDL cholesterol was 28  His only complaint is that of a mild hand tremor  Does not interfere with any of his ADLs or activities  He also complains of some irregular sleeping habits where he occasionally awakes in the early morning hours at 3 or 4:00 in the morning and has difficulty getting back to sleep but he denies any daytime somnolence    Review of Systems   Constitutional: Negative  Negative for activity change, appetite change, chills, diaphoresis, fatigue, fever and unexpected weight change  HENT: Negative  Eyes: Negative  Respiratory: Negative  Cardiovascular: Negative      Gastrointestinal: "Negative  Endocrine: Negative  Genitourinary: Negative  Musculoskeletal: Negative  Skin: Negative  Neurological: Negative  Hematological: Negative  Psychiatric/Behavioral: The patient is not nervous/anxious  Current Outpatient Medications on File Prior to Visit   Medication Sig   • amLODIPine (NORVASC) 10 mg tablet Take 1 tablet (10 mg total) by mouth daily   • candesartan (ATACAND) 32 MG tablet Take 1 tablet (32 mg total) by mouth daily   • finasteride (PROSCAR) 5 mg tablet Take 1 tablet (5 mg total) by mouth daily   • ibuprofen (MOTRIN) 600 mg tablet Take 1 tablet (600 mg total) by mouth every 8 (eight) hours as needed for mild pain   • linaGLIPtin 5 MG TABS Take 5 mg by mouth daily   • metFORMIN (GLUCOPHAGE) 1000 MG tablet Take 1 tablet (1,000 mg total) by mouth 2 (two) times a day with meals   • omeprazole (PriLOSEC) 40 MG capsule Take 1 capsule (40 mg total) by mouth daily   • oxybutynin (DITROPAN-XL) 10 MG 24 hr tablet TOME CATHY TABLETA TODOS LOS ESCOBAR   • rosuvastatin (CRESTOR) 20 MG tablet Take 1 tablet (20 mg total) by mouth daily   • sildenafil (VIAGRA) 100 mg tablet Take 1 tablet (100 mg total) by mouth daily as needed for erectile dysfunction   • tamsulosin (FLOMAX) 0 4 mg Take 1 capsule (0 4 mg total) by mouth daily with dinner       Objective     /66 (BP Location: Left arm, Patient Position: Sitting, Cuff Size: Standard)   Pulse 75   Temp 97 7 °F (36 5 °C) (Tympanic)   Ht 5' 2 6\" (1 59 m)   Wt 78 3 kg (172 lb 9 6 oz)   SpO2 98%   BMI 30 97 kg/m²     Physical Exam  Vitals reviewed  Constitutional:       General: He is not in acute distress  Appearance: Normal appearance  He is normal weight  He is not ill-appearing, toxic-appearing or diaphoretic  HENT:      Head: Normocephalic and atraumatic  Right Ear: External ear normal       Left Ear: External ear normal       Nose: Nose normal    Eyes:      General: No scleral icterus       Conjunctiva/sclera: " Conjunctivae normal       Pupils: Pupils are equal, round, and reactive to light  Neck:      Vascular: No carotid bruit or JVD  Trachea: No tracheal deviation  Cardiovascular:      Rate and Rhythm: Normal rate and regular rhythm  Pulses: Normal pulses  Heart sounds: Normal heart sounds  No murmur heard  Pulmonary:      Effort: Pulmonary effort is normal  No respiratory distress  Breath sounds: Normal breath sounds  No rales  Abdominal:      General: Abdomen is flat  There is no distension  Musculoskeletal:         General: No swelling  Cervical back: Neck supple  Right lower leg: No edema  Left lower leg: No edema  Skin:     General: Skin is warm  Coloration: Skin is not jaundiced  Findings: No bruising, erythema or rash  Neurological:      General: No focal deficit present  Mental Status: He is alert and oriented to person, place, and time  Mental status is at baseline     Psychiatric:         Mood and Affect: Mood normal          Behavior: Behavior normal        Jaya Ledesma MD

## 2023-06-06 NOTE — ASSESSMENT & PLAN NOTE
Continues on tamsulosin at at bedtime  He does experience occasional difficulty passing his urine but it should be noted he is also on oxybutynin XL 10 mg    We discussed discontinuing this medication but the patient felt it was better to control his nocturnal frequency

## 2023-06-06 NOTE — ASSESSMENT & PLAN NOTE
Patient prefers to monitor at this time    We had a long discussion regarding medications that could be utilized and after considering his options the patient would prefer to defer

## 2023-06-06 NOTE — ASSESSMENT & PLAN NOTE
Lab Results   Component Value Date    HGBA1C 6 4 (H) 06/03/2023   Hemoglobin A1c indicative of good glycemic control at 6 4%  No change in current medications    We will continue metformin and linagliptin

## 2023-07-11 DIAGNOSIS — N40.1 BPH WITH OBSTRUCTION/LOWER URINARY TRACT SYMPTOMS: ICD-10-CM

## 2023-07-11 DIAGNOSIS — N13.8 BPH WITH OBSTRUCTION/LOWER URINARY TRACT SYMPTOMS: ICD-10-CM

## 2023-07-11 DIAGNOSIS — N20.0 NEPHROLITHIASIS: ICD-10-CM

## 2023-07-11 DIAGNOSIS — N39.41 URGE INCONTINENCE: ICD-10-CM

## 2023-07-11 RX ORDER — OXYBUTYNIN CHLORIDE 10 MG/1
TABLET, EXTENDED RELEASE ORAL
Qty: 90 TABLET | Refills: 1 | Status: SHIPPED | OUTPATIENT
Start: 2023-07-11

## 2023-07-11 RX ORDER — FINASTERIDE 5 MG/1
5 TABLET, FILM COATED ORAL DAILY
Qty: 90 TABLET | Refills: 1 | Status: SHIPPED | OUTPATIENT
Start: 2023-07-11

## 2023-07-11 RX ORDER — TAMSULOSIN HYDROCHLORIDE 0.4 MG/1
0.4 CAPSULE ORAL
Qty: 90 CAPSULE | Refills: 1 | Status: SHIPPED | OUTPATIENT
Start: 2023-07-11

## 2023-08-09 DIAGNOSIS — E78.00 HYPERCHOLESTEROLEMIA: ICD-10-CM

## 2023-08-09 RX ORDER — ROSUVASTATIN CALCIUM 20 MG/1
20 TABLET, COATED ORAL DAILY
Qty: 90 TABLET | Refills: 1 | Status: SHIPPED | OUTPATIENT
Start: 2023-08-09

## 2023-08-22 DIAGNOSIS — I10 ESSENTIAL HYPERTENSION: ICD-10-CM

## 2023-08-22 RX ORDER — CANDESARTAN 32 MG/1
32 TABLET ORAL DAILY
Qty: 90 TABLET | Refills: 1 | Status: SHIPPED | OUTPATIENT
Start: 2023-08-22

## 2023-09-18 DIAGNOSIS — I10 BENIGN ESSENTIAL HYPERTENSION: ICD-10-CM

## 2023-09-18 DIAGNOSIS — E11.9 TYPE 2 DIABETES MELLITUS WITHOUT COMPLICATION, WITHOUT LONG-TERM CURRENT USE OF INSULIN (HCC): ICD-10-CM

## 2023-09-18 RX ORDER — AMLODIPINE BESYLATE 10 MG/1
10 TABLET ORAL DAILY
Qty: 90 TABLET | Refills: 0 | Status: SHIPPED | OUTPATIENT
Start: 2023-09-18

## 2023-09-25 ENCOUNTER — TELEPHONE (OUTPATIENT)
Age: 73
End: 2023-09-25

## 2023-10-02 DIAGNOSIS — N40.1 BPH WITH OBSTRUCTION/LOWER URINARY TRACT SYMPTOMS: ICD-10-CM

## 2023-10-02 DIAGNOSIS — N39.41 URGE INCONTINENCE: ICD-10-CM

## 2023-10-02 DIAGNOSIS — N13.8 BPH WITH OBSTRUCTION/LOWER URINARY TRACT SYMPTOMS: ICD-10-CM

## 2023-10-02 RX ORDER — OXYBUTYNIN CHLORIDE 10 MG/1
TABLET, EXTENDED RELEASE ORAL
Qty: 90 TABLET | Refills: 1 | Status: SHIPPED | OUTPATIENT
Start: 2023-10-02

## 2023-10-02 RX ORDER — FINASTERIDE 5 MG/1
5 TABLET, FILM COATED ORAL DAILY
Qty: 90 TABLET | Refills: 1 | Status: SHIPPED | OUTPATIENT
Start: 2023-10-02

## 2023-11-28 ENCOUNTER — APPOINTMENT (OUTPATIENT)
Dept: LAB | Facility: HOSPITAL | Age: 73
End: 2023-11-28
Payer: COMMERCIAL

## 2023-11-28 DIAGNOSIS — I42.0 DILATED CARDIOMYOPATHY (HCC): ICD-10-CM

## 2023-11-28 DIAGNOSIS — E78.1 PURE HYPERTRIGLYCERIDEMIA: ICD-10-CM

## 2023-11-28 DIAGNOSIS — E11.41 TYPE 2 DIABETES MELLITUS WITH DIABETIC MONONEUROPATHY, WITHOUT LONG-TERM CURRENT USE OF INSULIN (HCC): ICD-10-CM

## 2023-11-28 DIAGNOSIS — I10 BENIGN ESSENTIAL HYPERTENSION: ICD-10-CM

## 2023-11-28 LAB
ALBUMIN SERPL BCP-MCNC: 4 G/DL (ref 3.5–5)
ALP SERPL-CCNC: 59 U/L (ref 34–104)
ALT SERPL W P-5'-P-CCNC: 8 U/L (ref 7–52)
ANION GAP SERPL CALCULATED.3IONS-SCNC: 5 MMOL/L
AST SERPL W P-5'-P-CCNC: 10 U/L (ref 13–39)
BILIRUB SERPL-MCNC: 0.48 MG/DL (ref 0.2–1)
BUN SERPL-MCNC: 22 MG/DL (ref 5–25)
CALCIUM SERPL-MCNC: 9.1 MG/DL (ref 8.4–10.2)
CHLORIDE SERPL-SCNC: 107 MMOL/L (ref 96–108)
CHOLEST SERPL-MCNC: 127 MG/DL
CO2 SERPL-SCNC: 25 MMOL/L (ref 21–32)
CREAT SERPL-MCNC: 0.81 MG/DL (ref 0.6–1.3)
CREAT UR-MCNC: 130 MG/DL
ERYTHROCYTE [DISTWIDTH] IN BLOOD BY AUTOMATED COUNT: 14.2 % (ref 11.6–15.1)
EST. AVERAGE GLUCOSE BLD GHB EST-MCNC: 146 MG/DL
GFR SERPL CREATININE-BSD FRML MDRD: 88 ML/MIN/1.73SQ M
GLUCOSE P FAST SERPL-MCNC: 139 MG/DL (ref 65–99)
HBA1C MFR BLD: 6.7 %
HCT VFR BLD AUTO: 40.8 % (ref 36.5–49.3)
HDLC SERPL-MCNC: 37 MG/DL
HGB BLD-MCNC: 13.6 G/DL (ref 12–17)
LDLC SERPL CALC-MCNC: 45 MG/DL (ref 0–100)
MCH RBC QN AUTO: 29.6 PG (ref 26.8–34.3)
MCHC RBC AUTO-ENTMCNC: 33.3 G/DL (ref 31.4–37.4)
MCV RBC AUTO: 89 FL (ref 82–98)
MICROALBUMIN UR-MCNC: 9.4 MG/L
MICROALBUMIN/CREAT 24H UR: 7 MG/G CREATININE (ref 0–30)
NONHDLC SERPL-MCNC: 90 MG/DL
PLATELET # BLD AUTO: 225 THOUSANDS/UL (ref 149–390)
PMV BLD AUTO: 9.2 FL (ref 8.9–12.7)
POTASSIUM SERPL-SCNC: 4.2 MMOL/L (ref 3.5–5.3)
PROT SERPL-MCNC: 7 G/DL (ref 6.4–8.4)
RBC # BLD AUTO: 4.6 MILLION/UL (ref 3.88–5.62)
SODIUM SERPL-SCNC: 137 MMOL/L (ref 135–147)
TRIGL SERPL-MCNC: 224 MG/DL
WBC # BLD AUTO: 9.46 THOUSAND/UL (ref 4.31–10.16)

## 2023-11-28 PROCEDURE — 85027 COMPLETE CBC AUTOMATED: CPT

## 2023-11-28 PROCEDURE — 80053 COMPREHEN METABOLIC PANEL: CPT

## 2023-11-28 PROCEDURE — 82043 UR ALBUMIN QUANTITATIVE: CPT

## 2023-11-28 PROCEDURE — 80061 LIPID PANEL: CPT

## 2023-11-28 PROCEDURE — 82570 ASSAY OF URINE CREATININE: CPT

## 2023-11-28 PROCEDURE — 36415 COLL VENOUS BLD VENIPUNCTURE: CPT

## 2023-11-28 PROCEDURE — 83036 HEMOGLOBIN GLYCOSYLATED A1C: CPT

## 2023-11-30 ENCOUNTER — OFFICE VISIT (OUTPATIENT)
Age: 73
End: 2023-11-30
Payer: COMMERCIAL

## 2023-11-30 VITALS
WEIGHT: 174.2 LBS | SYSTOLIC BLOOD PRESSURE: 120 MMHG | DIASTOLIC BLOOD PRESSURE: 60 MMHG | OXYGEN SATURATION: 96 % | HEART RATE: 87 BPM | BODY MASS INDEX: 30.87 KG/M2 | TEMPERATURE: 97.7 F | HEIGHT: 63 IN

## 2023-11-30 DIAGNOSIS — Z00.00 MEDICARE ANNUAL WELLNESS VISIT, SUBSEQUENT: Primary | ICD-10-CM

## 2023-11-30 DIAGNOSIS — M54.42 CHRONIC BILATERAL LOW BACK PAIN WITH BILATERAL SCIATICA: ICD-10-CM

## 2023-11-30 DIAGNOSIS — G89.29 CHRONIC BILATERAL LOW BACK PAIN WITH BILATERAL SCIATICA: ICD-10-CM

## 2023-11-30 DIAGNOSIS — I10 BENIGN ESSENTIAL HYPERTENSION: ICD-10-CM

## 2023-11-30 DIAGNOSIS — E11.41 TYPE 2 DIABETES MELLITUS WITH DIABETIC MONONEUROPATHY, WITHOUT LONG-TERM CURRENT USE OF INSULIN (HCC): ICD-10-CM

## 2023-11-30 DIAGNOSIS — M54.41 CHRONIC BILATERAL LOW BACK PAIN WITH BILATERAL SCIATICA: ICD-10-CM

## 2023-11-30 DIAGNOSIS — D43.2 SUBEPENDYMOMA (HCC): ICD-10-CM

## 2023-11-30 PROCEDURE — G0439 PPPS, SUBSEQ VISIT: HCPCS | Performed by: INTERNAL MEDICINE

## 2023-11-30 PROCEDURE — 99214 OFFICE O/P EST MOD 30 MIN: CPT | Performed by: INTERNAL MEDICINE

## 2023-11-30 NOTE — PROGRESS NOTES
Diabetic Foot Exam    Patient's shoes and socks removed. Right Foot/Ankle   Right Foot Inspection  Skin Exam: skin normal and skin intact. No dry skin, no warmth, no callus, no erythema, no maceration, no abnormal color, no pre-ulcer, no ulcer and no callus. Toe Exam: No swelling, no tenderness, erythema and  no right toe deformity    Sensory   Vibration: intact  Proprioception: intact  Monofilament testing: intact    Vascular  Capillary refills: < 3 seconds  The right DP pulse is 2+. The right PT pulse is 2+. Left Foot/Ankle  Left Foot Inspection  Skin Exam: skin normal and skin intact. No dry skin, no warmth, no erythema, no maceration, normal color, no pre-ulcer, no ulcer and no callus. Toe Exam: No swelling, no tenderness, no erythema and no left toe deformity. Sensory   Vibration: intact  Proprioception: intact  Monofilament testing: intact    Vascular  Capillary refills: < 3 seconds  The left DP pulse is 2+. The left PT pulse is 2+. Assign Risk Category  No deformity present  No loss of protective sensation  No weak pulses  Risk: 0   Assessment and Plan:     Problem List Items Addressed This Visit          Endocrine    Type 2 diabetes mellitus with diabetic mononeuropathy, without long-term current use of insulin (HCC) - Primary     Hemoglobin A1c is consistent with good glycemic control.   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 Other    Medicare annual wellness visit, subsequent     The patient is up-to-date with age-appropriate immunizations and screenings            Depression Screening and Follow-up Plan: Patient was screened for depression during today's encounter. They screened negative with a PHQ-2 score of 0. Preventive health issues were discussed with patient, and age appropriate screening tests were ordered as noted in patient's After Visit Summary. Personalized health advice and appropriate referrals for health education or preventive services given if needed, as noted in patient's After Visit Summary. History of Present Illness:     Patient presents for a Medicare Wellness Visit    Patient presents to the office for a Medicare wellness visit a follow-up visit for his type 2 diabetes       Patient Care Team:  Ally Cardona MD as PCP - Marlon Fernandez MD as PCP - [facility]CP-[facility]  MD Michael Vazquez MD as Endoscopist     Review of Systems:     Review of Systems   Constitutional: Negative. Negative for activity change, appetite change, chills, diaphoresis, fatigue, fever and unexpected weight change. HENT: Negative. Eyes: Negative. Respiratory: Negative. Cardiovascular: Negative. Gastrointestinal: Negative. Endocrine: Negative. Genitourinary: Negative. Musculoskeletal:  Positive for back pain (Occasional low back pain with some mild right-sided sciatica). Skin: Negative. Neurological: Negative. Hematological: Negative. Psychiatric/Behavioral:  The patient is not nervous/anxious.          Problem List:     Patient Active Problem List   Diagnosis    Type 2 diabetes mellitus with diabetic mononeuropathy, without long-term current use of insulin (HCC)    Overactive bladder    Osteoarthritis of lumbar spine Hyperlipidemia    Grade II internal hemorrhoids    Dilated cardiomyopathy (HCC)    Chronic GERD    BPH with obstruction/lower urinary tract symptoms    Bilateral carpal tunnel syndrome    Benign essential hypertension    Erectile dysfunction    Screening for diabetic retinopathy    Tinea pedis of right foot    Primary osteoarthritis of left shoulder    Sensorineural hearing loss (SNHL) of left ear with unrestricted hearing of right ear    Medicare annual wellness visit, subsequent    Subacromial bursitis of left shoulder joint    Rotator cuff tendonitis, left    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[address])    Nephrolithiasis    Onychomycosis of multiple toenails with type 2 diabetes mellitus     Chronic bilateral low back pain with bilateral sciatica    COVID-19    Benign essential tremor      Past Medical and Surgical History:     Past Medical History:   Diagnosis Date    Abnormal echocardiogram 09/15/2017    Abnormal finding on MRI of brain 02/26/2020    Arthritis     OSTEOARTHRITIS OF LUMBAR SPINE    Brain tumor ([address]) 02/26/2020    Carpal tunnel syndrome     COVID     Diabetes mellitus (HCC)     Elevated prostate specific antigen (PSA)     Erectile dysfunction     GERD (gastroesophageal reflux disease)     H. pylori infection 07/17/2018    History of BPH     Hx of adenomatous colonic polyps     Hyperlipidemia     Hypertension     Hypertension      Past Surgical History:   Procedure Laterality Date    APPENDECTOMY      CARPAL TUNNEL RELEASE      COLONOSCOPY      CYSTOSCOPY W/ DILATION OF BLADDER  01/09/2017    DR. KILO LENZ, [facility]     EGD AND COLONOSCOPY N/A 2/8/2018    Procedure: EGD with biopsy AND COLONOSCOPY with polypectomy with hemo clip application;  Surgeon: Lyn Elias MD;  Location: [facility];   Service: Gastroenterology    NEUROPLASTY / TRANSPOSITION MEDIAN NERVE AT CARPAL TUNNEL      LEFT & RIGHT      Family History:     Family History   Problem Relation Age of Onset    Heart disease Mother Hypertension Mother     Ulcers Father     Stomach cancer Father     Diabetes Family     Hypertension Family     RAMY disease Family     Arthritis Family       Social History:     Social History     Socioeconomic History    Marital status: /Civil Union     Spouse name: None    Number of children: None    Years of education: None    Highest education level: None   Occupational History    None   Tobacco Use    Smoking status: Never    Smokeless tobacco: Never   Vaping Use    Vaping Use: Never used   Substance and Sexual Activity    Alcohol use: No    Drug use: No    Sexual activity: None   Other Topics Concern    None   Social History Narrative    None     Social Determinants of Health     Financial Resource Strain: Low Risk  (11/30/2023)    Overall Financial Resource Strain (CARDIA)     Difficulty of Paying Living Expenses: Not very hard   Food Insecurity: Not on file   Transportation Needs: No Transportation Needs (11/30/2023)    PRAPARE - Transportation     Lack of Transportation (Medical): No     Lack of Transportation (Non-Medical):  No   Physical Activity: Not on file   Stress: Not on file   Social Connections: Not on file   Intimate Partner Violence: Not on file   Housing Stability: Not on file      Medications and Allergies:     Current Outpatient Medications   Medication Sig Dispense Refill    amLODIPine (NORVASC) 10 mg tablet Take 1 tablet (10 mg total) by mouth daily 90 tablet 0    candesartan (ATACAND) 32 MG tablet Take 1 tablet (32 mg total) by mouth daily 90 tablet 1    finasteride (PROSCAR) 5 mg tablet Take 1 tablet (5 mg total) by mouth daily 90 tablet 1    ibuprofen (MOTRIN) 600 mg tablet Take 1 tablet (600 mg total) by mouth every 8 (eight) hours as needed for mild pain 90 tablet 1    linaGLIPtin 5 MG TABS Take 5 mg by mouth daily 90 tablet 1    metFORMIN (GLUCOPHAGE) 1000 MG tablet Take 1 tablet (1,000 mg total) by mouth 2 (two) times a day with meals 180 tablet 0    omeprazole (PriLOSEC) 40 MG capsule Take 1 capsule (40 mg total) by mouth daily 90 capsule 1    oxybutynin (DITROPAN-XL) 10 MG 24 hr tablet CARLO MORGAN TABLETA TODOS LOS ESCOBAR 90 tablet 1    rosuvastatin (CRESTOR) 20 MG tablet Take 1 tablet (20 mg total) by mouth daily 90 tablet 1    sildenafil (VIAGRA) 100 mg tablet Take 1 tablet (100 mg total) by mouth daily as needed for erectile dysfunction 3 tablet 5    tamsulosin (FLOMAX) 0.4 mg Take 1 capsule (0.4 mg total) by mouth daily with dinner 90 capsule 1     No current facility-administered medications for this visit. No Known Allergies   Immunizations:     Immunization History   Administered Date(s) Administered    COVID-19 MODERNA VACC 0.5 ML IM 01/19/2021, 02/15/2021, 11/01/2021, 11/01/2021, 04/09/2022    COVID-19 Moderna mRNA Vaccine 12 Yr+ 50 mcg/0.5 mL (Spikevax) 10/04/2023    INFLUENZA 10/06/2018, 10/07/2021, 09/16/2022, 10/04/2023    Influenza Split High Dose Preservative Free IM 11/30/2017, 10/06/2018    Influenza, high dose seasonal 0.7 mL 10/21/2019, 10/12/2020, 09/16/2022    Pneumococcal Conjugate 13-Valent 10/26/2018    Pneumococcal Polysaccharide PPV23 02/18/2020    Zoster Vaccine Recombinant 06/08/2023, 08/08/2023, 08/08/2023      Health Maintenance:         Topic Date Due    Colorectal Cancer Screening  02/08/2028    Hepatitis C Screening  Completed         Topic Date Due    COVID-19 Vaccine (6 - [employer] [place] series) 11/29/2023      Medicare Screening Tests and Risk Assessments:     Ethan Gary is here for his Subsequent Wellness visit. Health Risk Assessment:   Patient rates overall health as very good. Patient feels that their physical health rating is same. Patient is satisfied with their life. Eyesight was rated as same. Hearing was rated as same. Patient feels that their emotional and mental health rating is much better. Patients states they are never, rarely angry. Patient states they are never, rarely unusually tired/fatigued.  Pain experienced in the last 7 days has been some. Patient's pain rating has been 8/10. Patient states that he has experienced no weight loss or gain in last 6 months. Depression Screening:   PHQ-2 Score: 0      Fall Risk Screening: In the past year, patient has experienced: no history of falling in past year      Home Safety:  Patient does not have trouble with stairs inside or outside of their home. Patient has working smoke alarms and has working carbon monoxide detector. Home safety hazards include: none. Nutrition:   Current diet is Regular and Limited junk food. Medications:   Patient is currently taking over-the-counter supplements. OTC medications include: see medication list. Patient is able to manage medications. Activities of Daily Living (ADLs)/Instrumental Activities of Daily Living (IADLs):   Walk and transfer into and out of bed and chair?: Yes  Dress and groom yourself?: Yes    Bathe or shower yourself?: Yes    Feed yourself?  Yes  Do your laundry/housekeeping?: Yes  Manage your money, pay your bills and track your expenses?: Yes  Make your own meals?: Yes    Do your own shopping?: Yes    Previous Hospitalizations:   Any hospitalizations or ED visits within the last 12 months?: No      Advance Care Planning:   Living will: Yes    Durable POA for healthcare: No    Advanced directive: Yes    Advanced directive counseling given: Yes    ACP document given: Yes    Patient declined ACP directive: Yes    End of Life Decisions reviewed with patient: Yes    Provider agrees with end of life decisions: Yes      Cognitive Screening:   Provider or family/friend/caregiver concerned regarding cognition?: No    PREVENTIVE SCREENINGS      Cardiovascular Screening:    General: Screening Not Indicated and History Lipid Disorder      Diabetes Screening:     General: Screening Not Indicated and History Diabetes      Colorectal Cancer Screening:     General: Screening Current      Prostate Cancer Screening:    General: Screening Current Osteoporosis Screening:    General: Screening Not Indicated      Abdominal Aortic Aneurysm (AAA) Screening:    Risk factors include: age between 70-77 yo        General: Screening Not Indicated      Lung Cancer Screening:     General: Screening Not Indicated      Hepatitis C Screening:    General: Screening Current    Screening, Brief Intervention, and Referral to Treatment (SBIRT)    Screening  Typical number of drinks in a day: 0  Typical number of drinks in a week: 0  Interpretation: Low risk drinking behavior. No results found. Physical Exam:     /60 (BP Location: Left arm, Patient Position: Sitting, Cuff Size: Standard)   Pulse 87   Temp 97.7 °F (36.5 °C) (Temporal)   Ht 5' 2.6" (1.59 m)   Wt 79 kg (174 lb 3.2 oz)   SpO2 96%   BMI 31.26 kg/m²     Physical Exam  Vitals reviewed. Constitutional:       Appearance: He is well-developed and normal weight. HENT:      Head: Normocephalic and atraumatic. Right Ear: Tympanic membrane, ear canal and external ear normal. There is no impacted cerumen. Left Ear: Tympanic membrane, ear canal and external ear normal. There is no impacted cerumen. Nose: Nose normal.      Mouth/Throat:      Mouth: Mucous membranes are moist.      Pharynx: Oropharynx is clear. Eyes:      General: No scleral icterus. Conjunctiva/sclera: Conjunctivae normal.      Pupils: Pupils are equal, round, and reactive to light. Neck:      Vascular: No carotid bruit. Cardiovascular:      Rate and Rhythm: Normal rate and regular rhythm. Pulses: Normal pulses. no weak pulses          Dorsalis pedis pulses are 2+ on the right side and 2+ on the left side. Posterior tibial pulses are 2+ on the right side and 2+ on the left side. Heart sounds: Normal heart sounds. No murmur heard. Pulmonary:      Effort: Pulmonary effort is normal. No respiratory distress. Breath sounds: Normal breath sounds. Abdominal:      General: Abdomen is flat.  Bowel sounds are normal. There is no distension. Palpations: Abdomen is soft. There is no mass. Tenderness: There is no abdominal tenderness. There is no right CVA tenderness, left CVA tenderness, guarding or rebound. Hernia: No hernia is present. Musculoskeletal:         General: No swelling, tenderness or deformity. Normal range of motion. Cervical back: Normal range of motion and neck supple. Right lower leg: No edema. Left lower leg: No edema. Feet:      Right foot:      Skin integrity: No ulcer, skin breakdown, erythema, warmth, callus or dry skin. Left foot:      Skin integrity: No ulcer, skin breakdown, erythema, warmth, callus or dry skin. Skin:     General: Skin is warm and dry. Capillary Refill: Capillary refill takes less than 2 seconds. Coloration: Skin is not jaundiced. Findings: No bruising, erythema or rash. Neurological:      General: No focal deficit present. Mental Status: He is alert and oriented to person, place, and time. Mental status is at baseline. Psychiatric:         Mood and Affect: Mood normal.         Behavior: Behavior normal.         Thought Content:  Thought content normal.         Judgment: Judgment normal.          Jm Beckman MD

## 2023-12-01 NOTE — ASSESSMENT & PLAN NOTE
Regional low back pain and right-sided sciatica which is usually sponsored to brief courses of NSAIDs

## 2023-12-01 NOTE — ASSESSMENT & PLAN NOTE
Hemoglobin A1c is consistent with good glycemic control.   Lab Results   Component Value Date    HGBA1C 6.7 (H) 11/28/2023

## 2023-12-19 DIAGNOSIS — E11.41 TYPE 2 DIABETES MELLITUS WITH DIABETIC MONONEUROPATHY, WITHOUT LONG-TERM CURRENT USE OF INSULIN (HCC): ICD-10-CM

## 2023-12-19 DIAGNOSIS — I10 BENIGN ESSENTIAL HYPERTENSION: ICD-10-CM

## 2023-12-19 DIAGNOSIS — K21.9 CHRONIC GERD: ICD-10-CM

## 2023-12-19 DIAGNOSIS — E11.9 TYPE 2 DIABETES MELLITUS WITHOUT COMPLICATION, WITHOUT LONG-TERM CURRENT USE OF INSULIN (HCC): ICD-10-CM

## 2023-12-19 RX ORDER — OMEPRAZOLE 40 MG/1
40 CAPSULE, DELAYED RELEASE ORAL DAILY
Qty: 90 CAPSULE | Refills: 1 | Status: SHIPPED | OUTPATIENT
Start: 2023-12-19

## 2023-12-19 RX ORDER — AMLODIPINE BESYLATE 10 MG/1
10 TABLET ORAL DAILY
Qty: 90 TABLET | Refills: 1 | Status: SHIPPED | OUTPATIENT
Start: 2023-12-19

## 2024-01-18 ENCOUNTER — TELEPHONE (OUTPATIENT)
Dept: INTERNAL MEDICINE CLINIC | Facility: OTHER | Age: 74
End: 2024-01-18

## 2024-01-18 NOTE — TELEPHONE ENCOUNTER
Received call from patient who stated he has been experiencing sciatica pain from his hip to his leg. This keeps him up at night and causes leg pain. He would like to know if a script can be sent or if an appointment is needed.

## 2024-01-25 DIAGNOSIS — N20.0 NEPHROLITHIASIS: ICD-10-CM

## 2024-01-25 RX ORDER — TAMSULOSIN HYDROCHLORIDE 0.4 MG/1
0.4 CAPSULE ORAL
Qty: 90 CAPSULE | Refills: 1 | Status: SHIPPED | OUTPATIENT
Start: 2024-01-25

## 2024-01-29 PROBLEM — Z00.00 MEDICARE ANNUAL WELLNESS VISIT, SUBSEQUENT: Status: RESOLVED | Noted: 2019-06-17 | Resolved: 2024-01-29

## 2024-02-15 DIAGNOSIS — E78.00 HYPERCHOLESTEROLEMIA: ICD-10-CM

## 2024-02-15 DIAGNOSIS — I10 ESSENTIAL HYPERTENSION: ICD-10-CM

## 2024-02-15 RX ORDER — ROSUVASTATIN CALCIUM 20 MG/1
20 TABLET, COATED ORAL DAILY
Qty: 90 TABLET | Refills: 1 | Status: SHIPPED | OUTPATIENT
Start: 2024-02-15

## 2024-02-15 RX ORDER — CANDESARTAN 32 MG/1
32 TABLET ORAL DAILY
Qty: 90 TABLET | Refills: 1 | Status: SHIPPED | OUTPATIENT
Start: 2024-02-15

## 2024-03-20 ENCOUNTER — ESTABLISHED COMPREHENSIVE EXAM (OUTPATIENT)
Dept: URBAN - METROPOLITAN AREA CLINIC 6 | Facility: CLINIC | Age: 74
End: 2024-03-20

## 2024-03-20 DIAGNOSIS — E11.9 TYPE 2 DIABETES MELLITUS WITHOUT COMPLICATION, WITHOUT LONG-TERM CURRENT USE OF INSULIN (HCC): ICD-10-CM

## 2024-03-20 DIAGNOSIS — E11.9: ICD-10-CM

## 2024-03-20 DIAGNOSIS — H25.813: ICD-10-CM

## 2024-03-20 DIAGNOSIS — K21.9 CHRONIC GERD: ICD-10-CM

## 2024-03-20 DIAGNOSIS — I10 BENIGN ESSENTIAL HYPERTENSION: ICD-10-CM

## 2024-03-20 LAB
LEFT EYE DIABETIC RETINOPATHY: NORMAL
RIGHT EYE DIABETIC RETINOPATHY: NORMAL

## 2024-03-20 PROCEDURE — 92014 COMPRE OPH EXAM EST PT 1/>: CPT

## 2024-03-20 ASSESSMENT — VISUAL ACUITY
OD_CC: 20/30
OS_CC: 20/30

## 2024-03-20 ASSESSMENT — TONOMETRY
OS_IOP_MMHG: 20
OD_IOP_MMHG: 21

## 2024-03-21 RX ORDER — OMEPRAZOLE 40 MG/1
40 CAPSULE, DELAYED RELEASE ORAL DAILY
Qty: 90 CAPSULE | Refills: 1 | Status: SHIPPED | OUTPATIENT
Start: 2024-03-21

## 2024-03-21 RX ORDER — AMLODIPINE BESYLATE 10 MG/1
10 TABLET ORAL DAILY
Qty: 90 TABLET | Refills: 1 | Status: SHIPPED | OUTPATIENT
Start: 2024-03-21

## 2024-04-05 DIAGNOSIS — E11.41 TYPE 2 DIABETES MELLITUS WITH DIABETIC MONONEUROPATHY, WITHOUT LONG-TERM CURRENT USE OF INSULIN (HCC): ICD-10-CM

## 2024-04-15 DIAGNOSIS — N40.1 BPH WITH OBSTRUCTION/LOWER URINARY TRACT SYMPTOMS: ICD-10-CM

## 2024-04-15 DIAGNOSIS — N39.41 URGE INCONTINENCE: ICD-10-CM

## 2024-04-15 DIAGNOSIS — N13.8 BPH WITH OBSTRUCTION/LOWER URINARY TRACT SYMPTOMS: ICD-10-CM

## 2024-04-15 DIAGNOSIS — N20.0 NEPHROLITHIASIS: ICD-10-CM

## 2024-04-15 RX ORDER — FINASTERIDE 5 MG/1
5 TABLET, FILM COATED ORAL DAILY
Qty: 90 TABLET | Refills: 1 | Status: SHIPPED | OUTPATIENT
Start: 2024-04-15

## 2024-04-15 RX ORDER — TAMSULOSIN HYDROCHLORIDE 0.4 MG/1
0.4 CAPSULE ORAL
Qty: 90 CAPSULE | Refills: 0 | OUTPATIENT
Start: 2024-04-15

## 2024-04-15 RX ORDER — OXYBUTYNIN CHLORIDE 10 MG/1
TABLET, EXTENDED RELEASE ORAL
Qty: 90 TABLET | Refills: 1 | Status: SHIPPED | OUTPATIENT
Start: 2024-04-15

## 2024-05-07 DIAGNOSIS — N20.0 NEPHROLITHIASIS: ICD-10-CM

## 2024-05-07 RX ORDER — TAMSULOSIN HYDROCHLORIDE 0.4 MG/1
0.4 CAPSULE ORAL
Qty: 90 CAPSULE | Refills: 1 | Status: SHIPPED | OUTPATIENT
Start: 2024-05-07

## 2024-05-07 NOTE — TELEPHONE ENCOUNTER
Reason for call:   [x] Refill   [] Prior Auth  [x] Other: pharmacy does not have refills    Office:   [x] PCP/Provider - Pranay HERNANDEZ   [] Specialty/Provider -     Medication: Tamsulosin     Dose/Frequency: 0.4mg - daily with dinner     Quantity: 90    Pharmacy: [id number]    Does the patient have enough for 3 days?   [] Yes   [x] No - Send as HP to POD

## 2024-06-07 ENCOUNTER — RA CDI HCC (OUTPATIENT)
Dept: OTHER | Facility: HOSPITAL | Age: 74
End: 2024-06-07

## 2024-06-12 ENCOUNTER — OFFICE VISIT (OUTPATIENT)
Age: 74
End: 2024-06-12
Payer: COMMERCIAL

## 2024-06-12 VITALS
TEMPERATURE: 98.3 F | SYSTOLIC BLOOD PRESSURE: 110 MMHG | DIASTOLIC BLOOD PRESSURE: 60 MMHG | WEIGHT: 170 LBS | HEART RATE: 82 BPM | BODY MASS INDEX: 30.12 KG/M2 | OXYGEN SATURATION: 97 % | HEIGHT: 63 IN

## 2024-06-12 DIAGNOSIS — M54.42 CHRONIC BILATERAL LOW BACK PAIN WITH BILATERAL SCIATICA: ICD-10-CM

## 2024-06-12 DIAGNOSIS — E11.41 TYPE 2 DIABETES MELLITUS WITH DIABETIC MONONEUROPATHY, WITHOUT LONG-TERM CURRENT USE OF INSULIN (HCC): Primary | ICD-10-CM

## 2024-06-12 DIAGNOSIS — I10 BENIGN ESSENTIAL HYPERTENSION: ICD-10-CM

## 2024-06-12 DIAGNOSIS — M54.41 CHRONIC BILATERAL LOW BACK PAIN WITH BILATERAL SCIATICA: ICD-10-CM

## 2024-06-12 DIAGNOSIS — D43.2 SUBEPENDYMOMA (HCC): ICD-10-CM

## 2024-06-12 DIAGNOSIS — M25.512 ACUTE PAIN OF LEFT SHOULDER: ICD-10-CM

## 2024-06-12 DIAGNOSIS — G89.29 CHRONIC BILATERAL LOW BACK PAIN WITH BILATERAL SCIATICA: ICD-10-CM

## 2024-06-12 DIAGNOSIS — I42.0 DILATED CARDIOMYOPATHY (HCC): ICD-10-CM

## 2024-06-12 DIAGNOSIS — M47.816 SPONDYLOSIS OF LUMBAR REGION WITHOUT MYELOPATHY OR RADICULOPATHY: Primary | ICD-10-CM

## 2024-06-12 DIAGNOSIS — M47.816 SPONDYLOSIS OF LUMBAR REGION WITHOUT MYELOPATHY OR RADICULOPATHY: ICD-10-CM

## 2024-06-12 PROBLEM — U07.1 COVID-19: Status: RESOLVED | Noted: 2022-11-29 | Resolved: 2024-06-12

## 2024-06-12 LAB — SL AMB POCT HEMOGLOBIN AIC: 6.1 (ref ?–6.5)

## 2024-06-12 PROCEDURE — 99214 OFFICE O/P EST MOD 30 MIN: CPT | Performed by: INTERNAL MEDICINE

## 2024-06-12 PROCEDURE — 83036 HEMOGLOBIN GLYCOSYLATED A1C: CPT | Performed by: INTERNAL MEDICINE

## 2024-06-12 NOTE — PROGRESS NOTES
Ambulatory Visit  Name: Andriy Breen      : 1950      MRN: [id number]  Encounter Provider: Lenny Pires MD  Encounter Date: 2024   Encounter department: [facility] [place]    Assessment & Plan   1. Type 2 diabetes mellitus with diabetic mononeuropathy, without long-term current use of insulin (HCC)  Assessment & Plan:  Diabetes nicely controlled on current medications.  POC hemoglobin A1c is 6.1%.  Lab Results   Component Value Date    HGBA1C 6.1 2024     Orders:  -     POCT hemoglobin A1c  -     Comprehensive metabolic panel; Future; Expected date: 2024  -     Hemoglobin A1C; Future; Expected date: 2024  2. Spondylosis of lumbar region without myelopathy or radiculopathy  Assessment & Plan:  Uses periodic ibuprofen as needed for back pain.  Orders:  -     Ambulatory Referral to [facility]; Future  -     CBC and differential; Future; Expected date: 2024  -     Comprehensive metabolic panel; Future; Expected date: 2024  3. Acute pain of left shoulder  4. Chronic bilateral low back pain with bilateral sciatica  Assessment & Plan:  Patient will be referred to [facility] for evaluation for physical therapy and possibly epidural injection  Orders:  -     Ambulatory Referral to [facility]; Future  -     CBC and differential; Future; Expected date: 2024  -     Comprehensive metabolic panel; Future; Expected date: 2024  -     Hemoglobin A1C; Future; Expected date: 2024  5. Benign essential hypertension  Assessment & Plan:  Blood pressure is nicely controlled on current medications.  Orders:  -     CBC and differential; Future; Expected date: 2024  -     Comprehensive metabolic panel; Future; Expected date: 2024  -     Hemoglobin A1C; Future; Expected date: 2024  6. Dilated cardiomyopathy (HCC)  Assessment & Plan:  Patient's insurance will not approve  payment for follow-up echocardiogram.  Patient has been hemodynamically stable and euvolemic  7. 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HCC)  Assessment & Plan:  Asymptomatic.  Due for follow-up MRI in November.       History of Present Illness     Patient presents to the office for follow-up visit.  He had forgotten about the office visit until he got a reminder on the telephone so he did not get his previsit blood work done.  We did do a hemoglobin A1c in the office which was very good at 6.1%.  The patient himself reports no major problems other than ongoing issues with his lower back.  He would like to know if he can get an injection.  He takes ibuprofen on an as-needed basis with some improvement but never resolution.  He would also like to know if he can get a parking placard because of his back issues.  Explained to the patient that we will issue a temporary placard pending evaluation by orthopedics.  The patient otherwise has no major complaints or problems.  He denies any headaches any visual symptoms.  He denies any numbness or weakness in his lower extremities.  He does complain of intermittent sciatica which is bilateral in nature when it occurs.  He has no complaints of any chest discomfort, palpitations, dyspnea, orthopnea etc. he does have a history of dilated cardiomyopathy but his insurance apparently will not approve any follow-up echocardiograms because of stable physical examinations.        Review of Systems   Constitutional: Negative.  Negative for activity change, appetite change, chills, diaphoresis, fatigue, fever and unexpected weight change.   HENT: Negative.     Eyes: Negative.    Respiratory: Negative.     Cardiovascular: Negative.    Gastrointestinal: Negative.    Endocrine: Negative.    Genitourinary: Negative.    Musculoskeletal:  Positive for arthralgias (With bilateral sciatica.).   Skin: Negative.    Neurological: Negative.    Hematological: Negative.    Psychiatric/Behavioral:  The patient is not  "nervous/anxious.        Objective     /60 (BP Location: Left arm, Patient Position: Sitting, Cuff Size: Standard)   Pulse 82   Temp 98.3 °F (36.8 °C) (Temporal)   Ht 5' 2.76\" (1.594 m)   Wt 77.1 kg (170 lb)   SpO2 97%   BMI 30.35 kg/m²     Physical Exam  Vitals reviewed.   Constitutional:       General: He is not in acute distress.     Appearance: Normal appearance. He is not ill-appearing, toxic-appearing or diaphoretic.   HENT:      Head: Normocephalic and atraumatic.      Right Ear: External ear normal.      Left Ear: External ear normal.      Nose: Nose normal.      Mouth/Throat:      Mouth: Mucous membranes are moist.   Eyes:      General: No scleral icterus.     Conjunctiva/sclera: Conjunctivae normal.      Pupils: Pupils are equal, round, and reactive to light.   Neck:      Vascular: No JVD.      Trachea: No tracheal deviation.   Cardiovascular:      Rate and Rhythm: Normal rate and regular rhythm.      Heart sounds: Normal heart sounds. No murmur heard.  Pulmonary:      Effort: Pulmonary effort is normal. No respiratory distress.      Breath sounds: Normal breath sounds.   Abdominal:      General: Abdomen is flat. Bowel sounds are normal. There is no distension.   Musculoskeletal:         General: Normal range of motion.      Cervical back: Neck supple. No rigidity.      Right lower leg: No edema.      Left lower leg: No edema.   Lymphadenopathy:      Cervical: No cervical adenopathy.   Skin:     General: Skin is warm and dry.      Coloration: Skin is not jaundiced.      Findings: No bruising, erythema or rash.   Neurological:      General: No focal deficit present.      Mental Status: He is alert and oriented to person, place, and time. Mental status is at baseline.   Psychiatric:         Mood and Affect: Mood normal.         Behavior: Behavior normal.       Administrative Statements           "

## 2024-06-12 NOTE — ASSESSMENT & PLAN NOTE
Diabetes nicely controlled on current medications.  POC hemoglobin A1c is 6.1%.  Lab Results   Component Value Date    HGBA1C 6.1 06/12/2024

## 2024-06-12 NOTE — ASSESSMENT & PLAN NOTE
Patient will be referred to [facility] for evaluation for physical therapy and possibly epidural injection

## 2024-06-12 NOTE — ASSESSMENT & PLAN NOTE
Patient's insurance will not approve payment for follow-up echocardiogram.  Patient has been hemodynamically stable and euvolemic

## 2024-06-25 DIAGNOSIS — E11.9 TYPE 2 DIABETES MELLITUS WITHOUT COMPLICATION, WITHOUT LONG-TERM CURRENT USE OF INSULIN (HCC): ICD-10-CM

## 2024-06-25 DIAGNOSIS — I10 BENIGN ESSENTIAL HYPERTENSION: ICD-10-CM

## 2024-06-25 DIAGNOSIS — E11.41 TYPE 2 DIABETES MELLITUS WITH DIABETIC MONONEUROPATHY, WITHOUT LONG-TERM CURRENT USE OF INSULIN (HCC): ICD-10-CM

## 2024-06-25 RX ORDER — AMLODIPINE BESYLATE 10 MG/1
10 TABLET ORAL DAILY
Qty: 90 TABLET | Refills: 1 | Status: SHIPPED | OUTPATIENT
Start: 2024-06-25

## 2024-07-09 DIAGNOSIS — N13.8 BPH WITH OBSTRUCTION/LOWER URINARY TRACT SYMPTOMS: ICD-10-CM

## 2024-07-09 DIAGNOSIS — N39.41 URGE INCONTINENCE: ICD-10-CM

## 2024-07-09 DIAGNOSIS — N40.1 BPH WITH OBSTRUCTION/LOWER URINARY TRACT SYMPTOMS: ICD-10-CM

## 2024-07-10 RX ORDER — FINASTERIDE 5 MG/1
5 TABLET, FILM COATED ORAL DAILY
Qty: 100 TABLET | Refills: 1 | Status: SHIPPED | OUTPATIENT
Start: 2024-07-10

## 2024-07-10 RX ORDER — OXYBUTYNIN CHLORIDE 10 MG/1
TABLET, EXTENDED RELEASE ORAL
Qty: 100 TABLET | Refills: 1 | Status: SHIPPED | OUTPATIENT
Start: 2024-07-10

## 2024-07-15 ENCOUNTER — EVALUATION (OUTPATIENT)
Dept: PHYSICAL THERAPY | Facility: CLINIC | Age: 74
End: 2024-07-15
Payer: COMMERCIAL

## 2024-07-15 DIAGNOSIS — M54.41 CHRONIC BILATERAL LOW BACK PAIN WITH BILATERAL SCIATICA: ICD-10-CM

## 2024-07-15 DIAGNOSIS — G89.29 CHRONIC BILATERAL LOW BACK PAIN WITH BILATERAL SCIATICA: ICD-10-CM

## 2024-07-15 DIAGNOSIS — M54.42 CHRONIC BILATERAL LOW BACK PAIN WITH BILATERAL SCIATICA: ICD-10-CM

## 2024-07-15 DIAGNOSIS — M47.816 SPONDYLOSIS OF LUMBAR REGION WITHOUT MYELOPATHY OR RADICULOPATHY: ICD-10-CM

## 2024-07-15 PROCEDURE — 97112 NEUROMUSCULAR REEDUCATION: CPT | Performed by: PHYSICAL THERAPIST

## 2024-07-15 PROCEDURE — 97110 THERAPEUTIC EXERCISES: CPT | Performed by: PHYSICAL THERAPIST

## 2024-07-15 PROCEDURE — 97161 PT EVAL LOW COMPLEX 20 MIN: CPT | Performed by: PHYSICAL THERAPIST

## 2024-07-15 NOTE — TELEPHONE ENCOUNTER
Additional Information  • Negative: Is this related to a work injury?  • Negative: Is this related to an MVA?  • Negative: Are you currently recieving homecare services?  • Negative: Has the patient had unexplained weight loss?  • Negative: Does the patient have a fever?  • Negative: Is the patient experiencing urine retention?  • Negative: Is the patient experiencing acute drop foot or paralysis?  • Negative: Has the patient experienced major trauma? (fall from height, high speed collision, direct blow to spine) and is also experiencing nausea, light-headedness, or loss of consciousness?  • Negative: Is the patient experiencing blood in sputum?  • Affirmative: Is this a chronic condition?    Protocols used: [facility] Protocol

## 2024-07-15 NOTE — PATIENT INSTRUCTIONS
We will schedule some follow-up labs for October and see the patient for a follow-up visit afterwards  No changes to his current medications  Diabetes doing very well blood pressure is controlled cardiovascular status is stable  Patient is euvolemic  Continue Regimen: clobetasol 0.05 % topical cream : Apply to scar one to two times per day for 1 month Render In Strict Bullet Format?: No Detail Level: Zone

## 2024-07-15 NOTE — PROGRESS NOTES
PT Evaluation     Today's date: 7/15/2024  Patient name: Andriy Breen  : 1950  MRN: [id number]  Referring provider: Lenny Pires*  Dx:   Encounter Diagnosis     ICD-10-CM    1. Spondylosis of lumbar region without myelopathy or radiculopathy  M47.816 Ambulatory referral to PT spine      2. Chronic bilateral low back pain with bilateral sciatica  M54.42 Ambulatory referral to PT spine    M54.41     G89.29                      Assessment  Impairments: abnormal muscle firing, abnormal or restricted ROM, abnormal movement, activity intolerance, impaired physical strength, lacks appropriate home exercise program, pain with function and poor body mechanics  Symptom irritability: moderate    Assessment details: Pt is a 74 y.o. year old male presenting to physical therapy for Spondylosis of lumbar region without myelopathy or radiculopathy and chronic bilateral low back pain with bilateral sciatica.  He presents via comp spine program and is moderate risk based on Start Back assessment tool.  He presents with the following impairments: limited lumbar AROM, hypomobility, TA and MF deficits, LE weakness, and (+) neural tension on the L LE affecting her function with bending, lifting, twisting, walking long periods, sitting, getting out of chairs, and driving..  Pt will benefit from skilled physical therapy with mobility program to address functional limitations noted in evaluation and meet patient goals.    Goals  ST.    Pt will reduce pain to 5/10  2.    Pt will demonstrate good TA activation.    LT.   Pt will improve lumbar flexion to nil deficits for improved forward bending.  2.   Pt will improve hip flexion strength to 4+/5 for improved squatting and lifting.  3.   Pt will meet projected FOTO score.     Plan  Patient would benefit from: PT eval and skilled physical therapy  Planned modality interventions: biofeedback, cryotherapy, electrical stimulation/Russian stimulation, TENS,  thermotherapy: hydrocollator packs and unattended electrical stimulation    Planned therapy interventions: abdominal trunk stabilization, joint mobilization, manual therapy, behavior modification, body mechanics training, neuromuscular re-education, patient/caregiver education, strengthening, stretching, therapeutic activities, therapeutic exercise, flexibility, functional ROM exercises and home exercise program    Frequency: 2x week  Duration in weeks: 6        Subjective Evaluation    History of Present Illness  Mechanism of injury: Pt reports back pain for 7-8 years, but has worsened over the past couple months.  He is now retired.  His pain hurts in his waist and glutes and down the R LE.  He has tried therapy in the past but his pain seems to come back after so long.  He used to walk 2-3 miles every day, but cannot do that anymore.  Now his pain hurts if he is sitting or standing for too long, he cannot drive for long periods due to the pain in his waist and down his right leg.  He has tried hot packs which helps relax him.  He takes tylenol and ibuprofen as needed to help with his pain.  Denies any falls, injuries, or bowel/bladder changes.  No changes to medical hx.          Recurrent probem    Pain  Current pain ratin          Objective     Tenderness     Left Hip   No tenderness in the ASIS and PSIS.     Right Hip   No tenderness in the ASIS and PSIS.     Active Range of Motion     Lumbar   Flexion:  Restriction level: moderate  Extension:  with pain Restriction level: minimal  Left lateral flexion:  with pain Restriction level: moderate  Right lateral flexion:  with pain Restriction level: moderate  Left rotation:  Restriction level: minimal  Right rotation:  Restriction level: minimal    Joint Play     Hypomobile: T8, T9, T10, T11, T12, L1, L2, L3, L4, L5 and S1     Strength/Myotome Testing     Lumbar   Left   Heel walk: normal  Toe walk: normal    Right   Heel walk: normal  Toe walk: normal    Left  "Hip   Planes of Motion   Flexion: 4  Extension: 2+  Abduction: 4  External rotation: 4  Internal rotation: 4+    Right Hip   Planes of Motion   Flexion: 4-  Extension: 3+  Abduction: 4+  External rotation: 4+  Internal rotation: 4-    Left Knee   Flexion: 5  Extension: 5    Right Knee   Flexion: 5  Extension: 5    Left Ankle/Foot   Dorsiflexion: 4+    Right Ankle/Foot   Dorsiflexion: 4+    Muscle Activation   Patient unable to activate left transverse abdominals, left multifidus, right transverse abdominals and right multifidus.     Additional Muscle Activation Details  Fair TA activation  Poor MF activation    Tests     Lumbar     Left   Positive passive SLR and slump test.     Right   Negative passive SLR and slump test.     Left Hip   Negative AUBREY and FADIR.     Right Hip   Positive AUBREY.   Negative FADIR.              Precautions: mod risk, Barbadian speaking    Date 7/15            Visit # IE            FOTO IE             Re-eval IE              Manuals 7/15            Lumbar rotation mob ND gr IV            PA mobs             Paraspinal STM                          Neuro Re-Ed             Sciatic nerve glides 15x            Bridges 10x5\"            Clams             Dead Bugs             Bird Dogs                                       Ther Ex             Bike             DKTC 5x10\"            LTR 5x10\"            Leg press             SB rolling             Hamstring str             Piriformis str                          Ther Activity             Squats             Standing hip ext & ABD             Gait Training                                       Modalities                                            "

## 2024-07-19 ENCOUNTER — OFFICE VISIT (OUTPATIENT)
Dept: PHYSICAL THERAPY | Facility: CLINIC | Age: 74
End: 2024-07-19
Payer: COMMERCIAL

## 2024-07-19 DIAGNOSIS — M54.41 CHRONIC BILATERAL LOW BACK PAIN WITH BILATERAL SCIATICA: ICD-10-CM

## 2024-07-19 DIAGNOSIS — M47.816 SPONDYLOSIS OF LUMBAR REGION WITHOUT MYELOPATHY OR RADICULOPATHY: Primary | ICD-10-CM

## 2024-07-19 DIAGNOSIS — M54.42 CHRONIC BILATERAL LOW BACK PAIN WITH BILATERAL SCIATICA: ICD-10-CM

## 2024-07-19 DIAGNOSIS — G89.29 CHRONIC BILATERAL LOW BACK PAIN WITH BILATERAL SCIATICA: ICD-10-CM

## 2024-07-19 PROCEDURE — 97112 NEUROMUSCULAR REEDUCATION: CPT | Performed by: PHYSICAL THERAPIST

## 2024-07-19 PROCEDURE — 97110 THERAPEUTIC EXERCISES: CPT | Performed by: PHYSICAL THERAPIST

## 2024-07-19 NOTE — PROGRESS NOTES
"Daily Note     Today's date: 2024  Patient name: Andriy Breen  : 1950  MRN: [id number]  Referring provider: Lenny Pires*  Dx:   Encounter Diagnosis     ICD-10-CM    1. Spondylosis of lumbar region without myelopathy or radiculopathy  M47.816       2. Chronic bilateral low back pain with bilateral sciatica  M54.42     M54.41     G89.29                      Subjective: Pt reports his back is feel good today and he has been completing his home exercises.       Objective: See treatment diary below      Assessment:   Pt does well w progression of today's session and is challenged w addition of leg press, hip abd, and dead bug LE taps.  He has stiffness with trial of piriformis str.  Patient demonstrated fatigue post treatment, exhibited good technique with therapeutic exercises, and would benefit from continued PT.      Plan: Continue per plan of care.  Progress treatment as tolerated.       Precautions: mod risk, Swedish speaking    Date 7/15 7/19           Visit # IE 2           FOTO IE             Re-eval IE              Manuals 7/15 7/19           Lumbar rotation mob ND gr IV SF Gr IV           PA mobs             Paraspinal STM                          Neuro Re-Ed             Sciatic nerve glides 15x 15x           Bridges 10x5\" 15x5\"           Clams  20x GTB s/l           Dead Bugs  2x20 LE taps           Bird Dogs                                       Ther Ex             Bike  6'           DKTC 5x10\" 5x10\"           LTR 5x10\" 10x5\"           Leg press  30x 75#           SB rolling             Hamstring str             Piriformis str  3x20\" ea                        Ther Activity             Squats  10x           Standing hip ext & ABD  20x GTB           Gait Training                                       Modalities                                            " How Severe Is This Condition?: moderate

## 2024-07-22 ENCOUNTER — OFFICE VISIT (OUTPATIENT)
Dept: PHYSICAL THERAPY | Facility: CLINIC | Age: 74
End: 2024-07-22
Payer: COMMERCIAL

## 2024-07-22 DIAGNOSIS — M47.816 SPONDYLOSIS OF LUMBAR REGION WITHOUT MYELOPATHY OR RADICULOPATHY: Primary | ICD-10-CM

## 2024-07-22 DIAGNOSIS — G89.29 CHRONIC BILATERAL LOW BACK PAIN WITH BILATERAL SCIATICA: ICD-10-CM

## 2024-07-22 DIAGNOSIS — M54.41 CHRONIC BILATERAL LOW BACK PAIN WITH BILATERAL SCIATICA: ICD-10-CM

## 2024-07-22 DIAGNOSIS — M54.42 CHRONIC BILATERAL LOW BACK PAIN WITH BILATERAL SCIATICA: ICD-10-CM

## 2024-07-22 PROCEDURE — 97112 NEUROMUSCULAR REEDUCATION: CPT | Performed by: PHYSICAL THERAPIST

## 2024-07-22 PROCEDURE — 97110 THERAPEUTIC EXERCISES: CPT | Performed by: PHYSICAL THERAPIST

## 2024-07-22 NOTE — PROGRESS NOTES
"Daily Note     Today's date: 2024  Patient name: Andriy Breen  : 1950  MRN: [id number]  Referring provider: Lenny Pires*  Dx:   Encounter Diagnosis     ICD-10-CM    1. Spondylosis of lumbar region without myelopathy or radiculopathy  M47.816       2. Chronic bilateral low back pain with bilateral sciatica  M54.42     M54.41     G89.29           Start Time: 1030  Stop Time: 1120  Total time in clinic (min): 50 minutes    Subjective: Patient reports that he is doing okay today with some back pain after driving.      Objective: See treatment diary below      Assessment: Patient progressed weight on the leg press today and required VC to improve control and form. Patient exhibits some knee flexion compensation during standing ext  near end range hip ext. Patient demonstrated fatigue post treatment, exhibited good technique with therapeutic exercises, and would benefit from continued PT.      Plan: Continue per plan of care.  Progress treatment as tolerated.      Session completed by TEJINDER Zapata under supervision from Naveed Crow DPT.      Precautions: mod risk, Yi speaking    Date 7/15 7/19 7/22          Visit # IE 2 3          FOTO IE             Re-eval IE              Manuals 7/15 7/19 7/22          Lumbar rotation mob ND gr IV SF Gr IV ND Gr IV          PA mobs             Paraspinal STM                          Neuro Re-Ed             Sciatic nerve glides 15x 15x 20x R          Bridges 10x5\" 15x5\" 2x10 5\"          Clams  20x GTB s/l 2x10 3\" GTB s/l          Dead Bugs  2x20 LE taps 2x20 LE taps          Bird Dogs                                       Ther Ex             Bike  6' 6'          DKTC 5x10\" 5x10\" 10x10\"          LTR 5x10\" 10x5\" 10x5\" ea          Leg press  30x 75# 3x10 85#          SB rolling             Hamstring str             Piriformis str  3x20\" ea 3x30\" ea                       Ther Activity             Squats  10x           Standing hip ext & " ABD  20x GTB 20x GTB          Gait Training                                       Modalities

## 2024-07-24 ENCOUNTER — APPOINTMENT (OUTPATIENT)
Dept: PHYSICAL THERAPY | Facility: CLINIC | Age: 74
End: 2024-07-24
Payer: COMMERCIAL

## 2024-08-06 DIAGNOSIS — E78.00 HYPERCHOLESTEROLEMIA: ICD-10-CM

## 2024-08-07 RX ORDER — ROSUVASTATIN CALCIUM 20 MG/1
20 TABLET, COATED ORAL DAILY
Qty: 100 TABLET | Refills: 1 | Status: SHIPPED | OUTPATIENT
Start: 2024-08-07

## 2024-08-24 ENCOUNTER — TELEPHONE (OUTPATIENT)
Dept: OTHER | Facility: OTHER | Age: 74
End: 2024-08-24

## 2024-09-03 ENCOUNTER — HOSPITAL ENCOUNTER (EMERGENCY)
Facility: HOSPITAL | Age: 74
Discharge: HOME | End: 2024-09-03
Attending: EMERGENCY MEDICINE
Payer: COMMERCIAL

## 2024-09-03 VITALS
TEMPERATURE: 99 F | RESPIRATION RATE: 16 BRPM | HEART RATE: 68 BPM | WEIGHT: 170.64 LBS | BODY MASS INDEX: 30.46 KG/M2 | OXYGEN SATURATION: 99 % | DIASTOLIC BLOOD PRESSURE: 76 MMHG | SYSTOLIC BLOOD PRESSURE: 160 MMHG

## 2024-09-03 DIAGNOSIS — N39.0 UTI (URINARY TRACT INFECTION): Primary | ICD-10-CM

## 2024-09-03 DIAGNOSIS — I10 ESSENTIAL HYPERTENSION: ICD-10-CM

## 2024-09-03 DIAGNOSIS — M25.512 ACUTE PAIN OF LEFT SHOULDER: ICD-10-CM

## 2024-09-03 DIAGNOSIS — M47.816 SPONDYLOSIS OF LUMBAR REGION WITHOUT MYELOPATHY OR RADICULOPATHY: ICD-10-CM

## 2024-09-03 LAB
ALBUMIN SERPL BCG-MCNC: 3.7 G/DL (ref 3.5–5)
ALP SERPL-CCNC: 72 U/L (ref 34–104)
ALT SERPL W P-5'-P-CCNC: 28 U/L (ref 7–52)
ANION GAP SERPL CALCULATED.3IONS-SCNC: 6 MMOL/L (ref 4–13)
AST SERPL W P-5'-P-CCNC: 17 U/L (ref 13–39)
BACTERIA UR QL AUTO: ABNORMAL /HPF
BASOPHILS # BLD AUTO: 0.02 THOUSANDS/ÂΜL (ref 0–0.1)
BASOPHILS NFR BLD AUTO: 0 % (ref 0–1)
BILIRUB DIRECT SERPL-MCNC: 0.09 MG/DL (ref 0–0.2)
BILIRUB SERPL-MCNC: 0.5 MG/DL (ref 0.2–1)
BILIRUB UR QL STRIP: NEGATIVE
BUN SERPL-MCNC: 16 MG/DL (ref 5–25)
CALCIUM SERPL-MCNC: 9.3 MG/DL (ref 8.4–10.2)
CHLORIDE SERPL-SCNC: 104 MMOL/L (ref 96–108)
CLARITY UR: CLEAR
CO2 SERPL-SCNC: 25 MMOL/L (ref 21–32)
COLOR UR: COLORLESS
CREAT SERPL-MCNC: 0.72 MG/DL (ref 0.6–1.3)
EOSINOPHIL # BLD AUTO: 0.03 THOUSAND/ÂΜL (ref 0–0.61)
EOSINOPHIL NFR BLD AUTO: 0 % (ref 0–6)
ERYTHROCYTE [DISTWIDTH] IN BLOOD BY AUTOMATED COUNT: 14 % (ref 11.6–15.1)
GFR SERPL CREATININE-BSD FRML MDRD: 91 ML/MIN/1.73SQ M
GLUCOSE SERPL-MCNC: 121 MG/DL (ref 65–140)
GLUCOSE UR STRIP-MCNC: NEGATIVE MG/DL
HCT VFR BLD AUTO: 38.8 % (ref 36.5–49.3)
HGB BLD-MCNC: 13.1 G/DL (ref 12–17)
HGB UR QL STRIP.AUTO: NEGATIVE
IMM GRANULOCYTES # BLD AUTO: 0.05 THOUSAND/UL (ref 0–0.2)
IMM GRANULOCYTES NFR BLD AUTO: 1 % (ref 0–2)
KETONES UR STRIP-MCNC: NEGATIVE MG/DL
LACTATE SERPL-SCNC: 1.1 MMOL/L (ref 0.5–2)
LEUKOCYTE ESTERASE UR QL STRIP: ABNORMAL
LIPASE SERPL-CCNC: 13 U/L (ref 11–82)
LYMPHOCYTES # BLD AUTO: 2.5 THOUSANDS/ÂΜL (ref 0.6–4.47)
LYMPHOCYTES NFR BLD AUTO: 26 % (ref 14–44)
MCH RBC QN AUTO: 30.1 PG (ref 26.8–34.3)
MCHC RBC AUTO-ENTMCNC: 33.8 G/DL (ref 31.4–37.4)
MCV RBC AUTO: 89 FL (ref 82–98)
MONOCYTES # BLD AUTO: 0.76 THOUSAND/ÂΜL (ref 0.17–1.22)
MONOCYTES NFR BLD AUTO: 8 % (ref 4–12)
NEUTROPHILS # BLD AUTO: 6.46 THOUSANDS/ÂΜL (ref 1.85–7.62)
NEUTS SEG NFR BLD AUTO: 65 % (ref 43–75)
NITRITE UR QL STRIP: NEGATIVE
NON-SQ EPI CELLS URNS QL MICRO: ABNORMAL /HPF
NRBC BLD AUTO-RTO: 0 /100 WBCS
PH UR STRIP.AUTO: 6.5 [PH]
PLATELET # BLD AUTO: 264 THOUSANDS/UL (ref 149–390)
PMV BLD AUTO: 9.2 FL (ref 8.9–12.7)
POTASSIUM SERPL-SCNC: 3.9 MMOL/L (ref 3.5–5.3)
PROT SERPL-MCNC: 7.1 G/DL (ref 6.4–8.4)
PROT UR STRIP-MCNC: NEGATIVE MG/DL
RBC # BLD AUTO: 4.35 MILLION/UL (ref 3.88–5.62)
RBC #/AREA URNS AUTO: ABNORMAL /HPF
SODIUM SERPL-SCNC: 135 MMOL/L (ref 135–147)
SP GR UR STRIP.AUTO: 1 (ref 1–1.03)
UROBILINOGEN UR STRIP-ACNC: <2 MG/DL
WBC # BLD AUTO: 9.82 THOUSAND/UL (ref 4.31–10.16)
WBC #/AREA URNS AUTO: ABNORMAL /HPF

## 2024-09-03 PROCEDURE — 83690 ASSAY OF LIPASE: CPT | Performed by: EMERGENCY MEDICINE

## 2024-09-03 PROCEDURE — 81001 URINALYSIS AUTO W/SCOPE: CPT | Performed by: EMERGENCY MEDICINE

## 2024-09-03 PROCEDURE — 80048 BASIC METABOLIC PNL TOTAL CA: CPT | Performed by: EMERGENCY MEDICINE

## 2024-09-03 PROCEDURE — 87186 SC STD MICRODIL/AGAR DIL: CPT | Performed by: EMERGENCY MEDICINE

## 2024-09-03 PROCEDURE — 85025 COMPLETE CBC W/AUTO DIFF WBC: CPT | Performed by: EMERGENCY MEDICINE

## 2024-09-03 PROCEDURE — 83605 ASSAY OF LACTIC ACID: CPT | Performed by: EMERGENCY MEDICINE

## 2024-09-03 PROCEDURE — 87086 URINE CULTURE/COLONY COUNT: CPT | Performed by: EMERGENCY MEDICINE

## 2024-09-03 PROCEDURE — 87077 CULTURE AEROBIC IDENTIFY: CPT | Performed by: EMERGENCY MEDICINE

## 2024-09-03 PROCEDURE — 99284 EMERGENCY DEPT VISIT MOD MDM: CPT | Performed by: EMERGENCY MEDICINE

## 2024-09-03 PROCEDURE — 80076 HEPATIC FUNCTION PANEL: CPT | Performed by: EMERGENCY MEDICINE

## 2024-09-03 PROCEDURE — 96360 HYDRATION IV INFUSION INIT: CPT

## 2024-09-03 PROCEDURE — 36415 COLL VENOUS BLD VENIPUNCTURE: CPT | Performed by: EMERGENCY MEDICINE

## 2024-09-03 PROCEDURE — 99283 EMERGENCY DEPT VISIT LOW MDM: CPT

## 2024-09-03 RX ORDER — SULFAMETHOXAZOLE/TRIMETHOPRIM 800-160 MG
1 TABLET ORAL 2 TIMES DAILY
Qty: 20 TABLET | Refills: 0 | Status: SHIPPED | OUTPATIENT
Start: 2024-09-03 | End: 2024-09-13

## 2024-09-03 RX ORDER — SULFAMETHOXAZOLE/TRIMETHOPRIM 800-160 MG
1 TABLET ORAL ONCE
Status: COMPLETED | OUTPATIENT
Start: 2024-09-03 | End: 2024-09-03

## 2024-09-03 RX ADMIN — SULFAMETHOXAZOLE AND TRIMETHOPRIM 1 TABLET: 800; 160 TABLET ORAL at 17:07

## 2024-09-03 RX ADMIN — SODIUM CHLORIDE 1000 ML: 0.9 INJECTION, SOLUTION INTRAVENOUS at 14:35

## 2024-09-03 NOTE — ED PROVIDER NOTES
History  Chief Complaint   Patient presents with    Urinary Frequency     Began 4-5 days ago with frequency and reports a fever that began today.      73 YO male presents for evaluation of urinary frequency, suprapubic pressure and incontinence. Patient states this has been going on for the last few days, constant. The pressure has actually seemed to improve some, he has noticed some fevers. Patient denies similar in the past, he states he was previously taking medications for urination including tamsulosin, he is not currently taking this. Pt denies CP/SOB/F/C/N/V/D/C.      History provided by:  Patient   used: No        Prior to Admission Medications   Prescriptions Last Dose Informant Patient Reported? Taking?   amLODIPine (NORVASC) 10 mg tablet   No Yes   Sig: Take 1 tablet (10 mg total) by mouth daily   candesartan (ATACAND) 32 MG tablet   No Yes   Sig: Take 1 tablet (32 mg total) by mouth daily   finasteride (PROSCAR) 5 mg tablet   No Yes   Sig: Take 1 tablet (5 mg total) by mouth daily   ibuprofen (MOTRIN) 600 mg tablet  Self No No   Sig: Take 1 tablet (600 mg total) by mouth every 8 (eight) hours as needed for mild pain   linaGLIPtin 5 MG TABS   No Yes   Sig: Take 5 mg by mouth daily   metFORMIN (GLUCOPHAGE) 1000 MG tablet   No Yes   Sig: Take 1 tablet (1,000 mg total) by mouth 2 (two) times a day with meals   omeprazole (PriLOSEC) 40 MG capsule   No Yes   Sig: Take 1 capsule (40 mg total) by mouth daily   oxybutynin (DITROPAN-XL) 10 MG 24 hr tablet   No Yes   Sig: TOME CATHY TABLETA TODOS LOS ESCOBAR   rosuvastatin (CRESTOR) 20 MG tablet   No Yes   Sig: Take 1 tablet (20 mg total) by mouth daily   sildenafil (VIAGRA) 100 mg tablet  Self No No   Sig: Take 1 tablet (100 mg total) by mouth daily as needed for erectile dysfunction   tamsulosin (FLOMAX) 0.4 mg   No Yes   Sig: Take 1 capsule (0.4 mg total) by mouth daily with dinner      Facility-Administered Medications: None       Past Medical  History:   Diagnosis Date    Abnormal echocardiogram 09/15/2017    Abnormal finding on MRI of brain 02/26/2020    Arthritis     OSTEOARTHRITIS OF LUMBAR SPINE    Brain tumor (HCC) 02/26/2020    Carpal tunnel syndrome     COVID     COVID-19 11/29/2022    Diabetes mellitus (HCC)     Elevated prostate specific antigen (PSA)     Erectile dysfunction     GERD (gastroesophageal reflux disease)     H. pylori infection 07/17/2018    History of BPH     Hx of adenomatous colonic polyps     Hyperlipidemia     Hypertension     Hypertension        Past Surgical History:   Procedure Laterality Date    APPENDECTOMY      CARPAL TUNNEL RELEASE      COLONOSCOPY      CYSTOSCOPY W/ DILATION OF BLADDER  01/09/2017    DR. KILO LENZ, [facility]     EGD AND COLONOSCOPY N/A 2/8/2018    Procedure: EGD with biopsy AND COLONOSCOPY with polypectomy with hemo clip application;  Surgeon: Santana Paiz MD;  Location: [facility];  Service: Gastroenterology    NEUROPLASTY / TRANSPOSITION MEDIAN NERVE AT CARPAL TUNNEL      LEFT & RIGHT       Family History   Problem Relation Age of Onset    Heart disease Mother     Hypertension Mother     Ulcers Father     Stomach cancer Father     Diabetes Family     Hypertension Family     RAMY disease Family     Arthritis Family      I have reviewed and agree with the history as documented.    E-Cigarette/Vaping    E-Cigarette Use Never User      E-Cigarette/Vaping Substances    Nicotine No     THC No     CBD No     Flavoring No     Other No     Unknown No      Social History     Tobacco Use    Smoking status: Never    Smokeless tobacco: Never   Vaping Use    Vaping status: Never Used   Substance Use Topics    Alcohol use: Yes     Comment: occasional beer    Drug use: No       Review of Systems   Constitutional:  Positive for fever.   HENT:  Negative for dental problem.    Eyes:  Negative for visual disturbance.   Respiratory:  Negative for shortness of breath.    Cardiovascular:   Negative for chest pain.   Gastrointestinal:  Negative for abdominal pain, nausea and vomiting.   Genitourinary:  Positive for dysuria. Negative for frequency.   Musculoskeletal:  Negative for neck pain and neck stiffness.   Skin:  Negative for rash.   Neurological:  Negative for dizziness, weakness and light-headedness.   Psychiatric/Behavioral:  Negative for agitation, behavioral problems and confusion.    All other systems reviewed and are negative.      Physical Exam  Physical Exam  Vitals and nursing note reviewed.   Constitutional:       Appearance: He is well-developed.   HENT:      Head: Normocephalic and atraumatic.   Eyes:      Extraocular Movements: Extraocular movements intact.   Cardiovascular:      Rate and Rhythm: Normal rate and regular rhythm.      Pulses: Normal pulses.      Heart sounds: Normal heart sounds.   Pulmonary:      Effort: Pulmonary effort is normal.      Breath sounds: Normal breath sounds.   Abdominal:      General: There is no distension.      Tenderness: There is abdominal tenderness (Mild suprapubic tenderness).   Musculoskeletal:         General: Normal range of motion.      Cervical back: Normal range of motion.   Skin:     Findings: No rash.   Neurological:      Mental Status: He is alert and oriented to person, place, and time.   Psychiatric:         Behavior: Behavior normal.         Vital Signs  ED Triage Vitals   Temperature Pulse Respirations Blood Pressure SpO2   09/03/24 1326 09/03/24 1326 09/03/24 1326 09/03/24 1326 09/03/24 1326   99 °F (37.2 °C) 87 16 127/68 99 %      Temp Source Heart Rate Source Patient Position - Orthostatic VS BP Location FiO2 (%)   09/03/24 1326 09/03/24 1601 09/03/24 1326 09/03/24 1326 --   Oral Monitor Sitting Right arm       Pain Score       09/03/24 1601       7           Vitals:    09/03/24 1326 09/03/24 1601   BP: 127/68 160/76   Pulse: 87 68   Patient Position - Orthostatic VS: Sitting Sitting         Visual Acuity      ED  Medications  Medications   sodium chloride 0.9 % bolus 1,000 mL (0 mL Intravenous Stopped 9/3/24 1535)   sulfamethoxazole-trimethoprim (BACTRIM DS) 800-160 mg per tablet 1 tablet (1 tablet Oral Given 9/3/24 1707)       Diagnostic Studies  Results Reviewed       Procedure Component Value Units Date/Time    Urine Microscopic [[id number]]  (Abnormal) Collected: 09/03/24 1548    Lab Status: Final result Specimen: Urine, Clean Catch Updated: 09/03/24 1632     RBC, UA 1-2 /hpf      WBC, UA 10-20 /hpf      Epithelial Cells None Seen /hpf      Bacteria, UA Occasional /hpf     UA (URINE) with reflex to Scope [[id number]]  (Abnormal) Collected: 09/03/24 1548    Lab Status: Final result Specimen: Urine, Clean Catch Updated: 09/03/24 1622     Color, UA Colorless     Clarity, UA Clear     Specific Gravity, UA 1.003     pH, UA 6.5     Leukocytes, UA Small     Nitrite, UA Negative     Protein, UA Negative mg/dl      Glucose, UA Negative mg/dl      Ketones, UA Negative mg/dl      Urobilinogen, UA <2.0 mg/dl      Bilirubin, UA Negative     Occult Blood, UA Negative    Urine culture [[id number]] Collected: 09/03/24 1548    Lab Status: In process Specimen: Urine, Clean Catch Updated: 09/03/24 1552    Hepatic function panel [[id number]]  (Normal) Collected: 09/03/24 1434    Lab Status: Final result Specimen: Blood from Arm, Left Updated: 09/03/24 1511     Total Bilirubin 0.50 mg/dL      Bilirubin, Direct 0.09 mg/dL      Alkaline Phosphatase 72 U/L      AST 17 U/L      ALT 28 U/L      Total Protein 7.1 g/dL      Albumin 3.7 g/dL     Lipase [[id number]]  (Normal) Collected: 09/03/24 1434    Lab Status: Final result Specimen: Blood from Arm, Left Updated: 09/03/24 1511     Lipase 13 u/L     Basic metabolic panel [[id number]] Collected: 09/03/24 1434    Lab Status: Final result Specimen: Blood from Arm, Left Updated: 09/03/24 1511     Sodium 135 mmol/L      Potassium 3.9 mmol/L      Chloride 104 mmol/L      CO2 25 mmol/L      ANION GAP 6 mmol/L       BUN 16 mg/dL      Creatinine 0.72 mg/dL      Glucose 121 mg/dL      Calcium 9.3 mg/dL      eGFR 91 ml/min/1.73sq m     Narrative:      National Kidney Disease Foundation guidelines for Chronic Kidney Disease (CKD):     Stage 1 with normal or high GFR (GFR > 90 mL/min/1.73 square meters)    Stage 2 Mild CKD (GFR = 60-89 mL/min/1.73 square meters)    Stage 3A Moderate CKD (GFR = 45-59 mL/min/1.73 square meters)    Stage 3B Moderate CKD (GFR = 30-44 mL/min/1.73 square meters)    Stage 4 Severe CKD (GFR = 15-29 mL/min/1.73 square meters)    Stage 5 End Stage CKD (GFR <15 mL/min/1.73 square meters)  Note: GFR calculation is accurate only with a steady state creatinine    Lactic acid, plasma (w/reflex if result > 2.0) [[id number]]  (Normal) Collected: 09/03/24 1434    Lab Status: Final result Specimen: Blood from Arm, Left Updated: 09/03/24 1510     LACTIC ACID 1.1 mmol/L     Narrative:      Result may be elevated if tourniquet was used during collection.    CBC and differential [[id number]] Collected: 09/03/24 1434    Lab Status: Final result Specimen: Blood from Arm, Left Updated: 09/03/24 1450     WBC 9.82 Thousand/uL      RBC 4.35 Million/uL      Hemoglobin 13.1 g/dL      Hematocrit 38.8 %      MCV 89 fL      MCH 30.1 pg      MCHC 33.8 g/dL      RDW 14.0 %      MPV 9.2 fL      Platelets 264 Thousands/uL      nRBC 0 /100 WBCs      Segmented % 65 %      Immature Grans % 1 %      Lymphocytes % 26 %      Monocytes % 8 %      Eosinophils Relative 0 %      Basophils Relative 0 %      Absolute Neutrophils 6.46 Thousands/µL      Absolute Immature Grans 0.05 Thousand/uL      Absolute Lymphocytes 2.50 Thousands/µL      Absolute Monocytes 0.76 Thousand/µL      Eosinophils Absolute 0.03 Thousand/µL      Basophils Absolute 0.02 Thousands/µL                    No orders to display              Procedures  Procedures         ED Course                                 SBIRT 20yo+      Flowsheet Row Most Recent Value   Initial  Alcohol Screen: US AUDIT-C     1. How often do you have a drink containing alcohol? 0 Filed at: 09/03/2024 1609   2. How many drinks containing alcohol do you have on a typical day you are drinking?  0 Filed at: 09/03/2024 1609   3b. FEMALE Any Age, or MALE 65+: How often do you have 4 or more drinks on one occassion? 0 Filed at: 09/03/2024 1609   Audit-C Score 0 Filed at: 09/03/2024 1609   ZULEIKA: How many times in the past year have you...    Used an illegal drug or used a prescription medication for non-medical reasons? Never Filed at: 09/03/2024 1609                      Medical Decision Making  1. Dysuria - Patient states he is having issues with incontinence, some suprapubic pressure. Will check CBC for leukocytosis, metabolic panel for electrolyte abnormalities and dehydration, urine for infection, lactic acid, metabolic panel for electrolyte abnormalities and dehydration. Will give IV fluids.    Problems Addressed:  UTI (urinary tract infection): acute illness or injury    Amount and/or Complexity of Data Reviewed  Independent Historian: spouse  Labs: ordered.    Risk  Prescription drug management.                 Disposition  Final diagnoses:   UTI (urinary tract infection)     Time reflects when diagnosis was documented in both MDM as applicable and the Disposition within this note       Time User Action Codes Description Comment    9/3/2024  5:00 PM Desean Aguirre Add [N39.0] UTI (urinary tract infection)           ED Disposition       ED Disposition   Discharge    Condition   Stable    Date/Time   Tue Sep 3, 2024 1700    Comment   Andriy Breen discharge to home/self care.                   Follow-up Information       Follow up With Specialties Details Why Contact Info Additional Information    [facility] Schedule an appointment as soon as possible for a visit   [address] [place] [address]  [address]  [facility] [address] [phone] [facility]  [address], [place], [place], [address] [phone]            Discharge Medication List as of 9/3/2024  5:03 PM        START taking these medications    Details   sulfamethoxazole-trimethoprim (BACTRIM DS) 800-160 mg per tablet Take 1 tablet by mouth 2 (two) times a day for 10 days smx-tmp DS (BACTRIM) 800-160 mg tabs (1tab q12 D10), Starting Tue 9/3/2024, Until Fri 9/13/2024, Normal           CONTINUE these medications which have NOT CHANGED    Details   amLODIPine (NORVASC) 10 mg tablet Take 1 tablet (10 mg total) by mouth daily, Starting Tue 6/25/2024, Normal      candesartan (ATACAND) 32 MG tablet Take 1 tablet (32 mg total) by mouth daily, Starting Thu 2/15/2024, Normal      finasteride (PROSCAR) 5 mg tablet Take 1 tablet (5 mg total) by mouth daily, Starting Wed 7/10/2024, Normal      linaGLIPtin 5 MG TABS Take 5 mg by mouth daily, Starting Tue 6/25/2024, Until Sun 12/22/2024, Normal      metFORMIN (GLUCOPHAGE) 1000 MG tablet Take 1 tablet (1,000 mg total) by mouth 2 (two) times a day with meals, Starting Tue 6/25/2024, Normal      omeprazole (PriLOSEC) 40 MG capsule Take 1 capsule (40 mg total) by mouth daily, Starting Thu 3/21/2024, Normal      oxybutynin (DITROPAN-XL) 10 MG 24 hr tablet TOME CATHY TABLETA TODOS LOS ESCOBAR, Normal      rosuvastatin (CRESTOR) 20 MG tablet Take 1 tablet (20 mg total) by mouth daily, Starting Wed 8/7/2024, Normal      tamsulosin (FLOMAX) 0.4 mg Take 1 capsule (0.4 mg total) by mouth daily with dinner, Starting Tue 5/7/2024, Normal      ibuprofen (MOTRIN) 600 mg tablet Take 1 tablet (600 mg total) by mouth every 8 (eight) hours as needed for mild pain, Starting Tue 11/29/2022, Normal      sildenafil (VIAGRA) 100 mg tablet Take 1 tablet (100 mg total) by mouth daily as needed for erectile dysfunction, Starting Tue 11/29/2022, Normal             No discharge procedures on file.    PDMP Review       None            ED Provider  Electronically  Signed by             Desean Aguirre MD  09/03/24 5530

## 2024-09-03 NOTE — DISCHARGE INSTRUCTIONS
Take the Bactrim twice daily for the next 10 days, make sure to finish off the entire course.    The number for urology is included in these discharge instructions, call to be seen in the office for further evaluation and management.     Neffs Bactrim dos veces al día yoselin los próximos 10 días y asegúrese de terminar todo el tratamiento.     El número de urología está incluido en estas instrucciones de daryl. Llame para que lo atiendan en el consultorio para deisi evaluación y un tratamiento adicionales.

## 2024-09-04 RX ORDER — CANDESARTAN 32 MG/1
32 TABLET ORAL DAILY
Qty: 90 TABLET | Refills: 1 | Status: SHIPPED | OUTPATIENT
Start: 2024-09-04

## 2024-09-04 RX ORDER — IBUPROFEN 600 MG/1
600 TABLET, FILM COATED ORAL EVERY 8 HOURS PRN
Qty: 90 TABLET | Refills: 1 | Status: SHIPPED | OUTPATIENT
Start: 2024-09-04

## 2024-09-04 NOTE — TELEPHONE ENCOUNTER
Patient called requesting refill for candesartan. Patient made aware medication was refilled on 09/04/24 for 90 tablets with 1 refill at [facility] pharmacy. Patient instructed to contact the pharmacy to obtain refills of medication. Patient verbalized understanding.

## 2024-09-04 NOTE — TELEPHONE ENCOUNTER
Patient called requesting refill for ibuprofen. Patient made aware medication was refilled on 09/04/24 for 90 tablets with 1 refill at [facility] pharmacy. Patient instructed to contact the pharmacy to obtain refills of medication. Patient verbalized understanding.

## 2024-09-05 LAB — BACTERIA UR CULT: ABNORMAL

## 2024-09-16 ENCOUNTER — OFFICE VISIT (OUTPATIENT)
Dept: UROLOGY | Facility: MEDICAL CENTER | Age: 74
End: 2024-09-16
Payer: COMMERCIAL

## 2024-09-16 VITALS
RESPIRATION RATE: 20 BRPM | SYSTOLIC BLOOD PRESSURE: 128 MMHG | DIASTOLIC BLOOD PRESSURE: 70 MMHG | WEIGHT: 162 LBS | HEIGHT: 63 IN | BODY MASS INDEX: 28.7 KG/M2 | HEART RATE: 73 BPM | OXYGEN SATURATION: 99 %

## 2024-09-16 DIAGNOSIS — R31.9 URINARY TRACT INFECTION WITH HEMATURIA, SITE UNSPECIFIED: Primary | ICD-10-CM

## 2024-09-16 DIAGNOSIS — R35.0 URINARY FREQUENCY: ICD-10-CM

## 2024-09-16 DIAGNOSIS — N40.1 BPH WITH URINARY OBSTRUCTION: ICD-10-CM

## 2024-09-16 DIAGNOSIS — N39.0 URINARY TRACT INFECTION WITH HEMATURIA, SITE UNSPECIFIED: Primary | ICD-10-CM

## 2024-09-16 DIAGNOSIS — N13.8 BPH WITH URINARY OBSTRUCTION: ICD-10-CM

## 2024-09-16 LAB
POST-VOID RESIDUAL VOLUME, ML POC: 14 ML
SL AMB  POCT GLUCOSE, UA: NORMAL
SL AMB LEUKOCYTE ESTERASE,UA: NORMAL
SL AMB POCT BILIRUBIN,UA: NORMAL
SL AMB POCT BLOOD,UA: NORMAL
SL AMB POCT CLARITY,UA: CLEAR
SL AMB POCT COLOR,UA: YELLOW
SL AMB POCT KETONES,UA: NORMAL
SL AMB POCT NITRITE,UA: NORMAL
SL AMB POCT PH,UA: 5.5
SL AMB POCT SPECIFIC GRAVITY,UA: 1.02
SL AMB POCT URINE PROTEIN: NORMAL
SL AMB POCT UROBILINOGEN: 0.2

## 2024-09-16 PROCEDURE — 51798 US URINE CAPACITY MEASURE: CPT | Performed by: UROLOGY

## 2024-09-16 PROCEDURE — 81003 URINALYSIS AUTO W/O SCOPE: CPT | Performed by: UROLOGY

## 2024-09-16 PROCEDURE — 99204 OFFICE O/P NEW MOD 45 MIN: CPT | Performed by: UROLOGY

## 2024-09-16 RX ORDER — TROSPIUM CHLORIDE 20 MG/1
20 TABLET, FILM COATED ORAL 2 TIMES DAILY
Qty: 60 TABLET | Refills: 3 | Status: SHIPPED | OUTPATIENT
Start: 2024-09-16

## 2024-09-16 NOTE — PATIENT INSTRUCTIONS
Stop oxybutynin.  Start trospium twice per day.  Continue tamsulosin and finasteride.    PSA after October 13

## 2024-09-16 NOTE — PROGRESS NOTES
"   HISTORY:    BPH and significant urgency frequency, occasional near urge incontinence.    He has been on finasteride and tamsulosin for a long time but does not think it is helping so much.    Oxybutynin helps a little bit with the frequency.  However still, gets up 4 times per night, daytime about every 1.5 to 2 hours.    No hematuria infection stones    PSAs avoids below, he is worried about cancer.    2 family members had prostate cancer         ASSESSMENT / PLAN:    1.  Switch from oxybutynin to trospium and 20 g twice per day    2.  Follow-up 3 months, cystoscopy at that time    The following portions of the patient's history were reviewed and updated as appropriate: allergies, current medications, past family history, past medical history, past social history, past surgical history, and problem list.    Review of Systems      Objective:     Physical Exam  Genitourinary:     Comments: Penis testes normal    Prostate minimally large no nodules          0   Lab Value Date/Time    PSA 0.46 06/03/2023 1010    PSA 1.0 10/26/2021 0934    PSA 2.2 10/11/2019 1044    PSA 4.2 (H) 07/25/2017 0000   ]  BUN   Date Value Ref Range Status   09/03/2024 16 5 - 25 mg/dL Final   06/04/2019 27 (H) 7 - 25 mg/dL Final     Creatinine   Date Value Ref Range Status   09/03/2024 0.72 0.60 - 1.30 mg/dL Final     Comment:     Standardized to IDMS reference method     No components found for: \"CBC\"      Patient Active Problem List   Diagnosis    Type 2 diabetes mellitus with diabetic mononeuropathy, without long-term current use of insulin (HCC)    Overactive bladder    Osteoarthritis of lumbar spine    Hyperlipidemia    Grade II internal hemorrhoids    Dilated cardiomyopathy (HCC)    Chronic GERD    BPH with obstruction/lower urinary tract symptoms    Bilateral carpal tunnel syndrome    Benign essential hypertension    Erectile dysfunction    Screening for diabetic retinopathy    Tinea pedis of right foot    Primary osteoarthritis of " left shoulder    Sensorineural hearing loss (SNHL) of left ear with unrestricted hearing of right ear    Subacromial bursitis of left shoulder joint    Rotator cuff tendonitis, left    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HCC)    Nephrolithiasis    Onychomycosis of multiple toenails with type 2 diabetes mellitus  (HCC)    Chronic bilateral low back pain with bilateral sciatica    Benign essential tremor        Diagnoses and all orders for this visit:    Urinary tract infection with hematuria, site unspecified  -     POCT urine dip auto non-scope  -     POCT Measure PVR    Urinary frequency  -     trospium chloride (SANCTURA) 20 mg tablet; Take 1 tablet (20 mg total) by mouth 2 (two) times a day    BPH with urinary obstruction  -     PSA Total, Diagnostic; Future           Patient ID: Andriy Breen is a 74 y.o. male.      Current Outpatient Medications:     amLODIPine (NORVASC) 10 mg tablet, Take 1 tablet (10 mg total) by mouth daily, Disp: 90 tablet, Rfl: 1    candesartan (ATACAND) 32 MG tablet, Take 1 tablet (32 mg total) by mouth daily, Disp: 90 tablet, Rfl: 1    finasteride (PROSCAR) 5 mg tablet, Take 1 tablet (5 mg total) by mouth daily, Disp: 100 tablet, Rfl: 1    ibuprofen (MOTRIN) 600 mg tablet, Take 1 tablet (600 mg total) by mouth every 8 (eight) hours as needed for mild pain, Disp: 90 tablet, Rfl: 1    linaGLIPtin 5 MG TABS, Take 5 mg by mouth daily, Disp: 90 tablet, Rfl: 1    metFORMIN (GLUCOPHAGE) 1000 MG tablet, Take 1 tablet (1,000 mg total) by mouth 2 (two) times a day with meals, Disp: 180 tablet, Rfl: 1    omeprazole (PriLOSEC) 40 MG capsule, Take 1 capsule (40 mg total) by mouth daily, Disp: 90 capsule, Rfl: 1    rosuvastatin (CRESTOR) 20 MG tablet, Take 1 tablet (20 mg total) by mouth daily, Disp: 100 tablet, Rfl: 1    sildenafil (VIAGRA) 100 mg tablet, Take 1 tablet (100 mg total) by mouth daily as needed for erectile dysfunction, Disp: 3 tablet, Rfl: 5    tamsulosin (FLOMAX) 0.4 mg, Take 1 capsule (0.4 mg total) by  mouth daily with dinner, Disp: 90 capsule, Rfl: 1    trospium chloride (SANCTURA) 20 mg tablet, Take 1 tablet (20 mg total) by mouth 2 (two) times a day, Disp: 60 tablet, Rfl: 3    Past Medical History:   Diagnosis Date    Abnormal echocardiogram 09/15/2017    Abnormal finding on MRI of brain 02/26/2020    Arthritis     OSTEOARTHRITIS OF LUMBAR SPINE    Brain tumor (HCC) 02/26/2020    Carpal tunnel syndrome     COVID     COVID-19 11/29/2022    Diabetes mellitus (HCC)     Elevated prostate specific antigen (PSA)     Erectile dysfunction     GERD (gastroesophageal reflux disease)     H. pylori infection 07/17/2018    History of BPH     Hx of adenomatous colonic polyps     Hyperlipidemia     Hypertension     Hypertension        Past Surgical History:   Procedure Laterality Date    APPENDECTOMY      CARPAL TUNNEL RELEASE      COLONOSCOPY      CYSTOSCOPY W/ DILATION OF BLADDER  01/09/2017    DR. KILO LENZ, [facility]     EGD AND COLONOSCOPY N/A 2/8/2018    Procedure: EGD with biopsy AND COLONOSCOPY with polypectomy with hemo clip application;  Surgeon: Santana Paiz MD;  Location: [facility];  Service: Gastroenterology    NEUROPLASTY / TRANSPOSITION MEDIAN NERVE AT CARPAL TUNNEL      LEFT & RIGHT       Social History

## 2024-09-21 DIAGNOSIS — K21.9 CHRONIC GERD: ICD-10-CM

## 2024-09-21 RX ORDER — OMEPRAZOLE 40 MG/1
40 CAPSULE, DELAYED RELEASE ORAL DAILY
Qty: 90 CAPSULE | Refills: 0 | Status: SHIPPED | OUTPATIENT
Start: 2024-09-21

## 2024-10-01 ENCOUNTER — CONSULT (OUTPATIENT)
Dept: PAIN MEDICINE | Facility: CLINIC | Age: 74
End: 2024-10-01
Payer: COMMERCIAL

## 2024-10-01 VITALS — HEIGHT: 63 IN | BODY MASS INDEX: 28.7 KG/M2 | WEIGHT: 162 LBS

## 2024-10-01 DIAGNOSIS — M54.16 LUMBAR RADICULOPATHY: Primary | ICD-10-CM

## 2024-10-01 PROCEDURE — G2211 COMPLEX E/M VISIT ADD ON: HCPCS | Performed by: ANESTHESIOLOGY

## 2024-10-01 PROCEDURE — 99204 OFFICE O/P NEW MOD 45 MIN: CPT | Performed by: ANESTHESIOLOGY

## 2024-10-01 RX ORDER — GABAPENTIN 300 MG/1
300 CAPSULE ORAL
Qty: 30 CAPSULE | Refills: 2 | Status: SHIPPED | OUTPATIENT
Start: 2024-10-01

## 2024-10-01 NOTE — PROGRESS NOTES
Assessment  1. Lumbar radiculopathy        Patient presenting with chronic back and bilateral right greater than left radiating leg pain for greater than 7 years, worsening over the past several years.    Pain is consistent with lumbar radicular pain accompanied by pain 10/10 on the pain scale with inability to participate in IADLs for >6 weeks. Patient has participated with physical therapy as well as home exercises and stretches.  Patient has tried ibuprofen with modest benefit.  Lidocaine patches provide no benefit  Denies any bowel or bladder incontinence, saddle anesthesia.    In regards to the patient's  pathology, we discussed the various treatment options including physical therapy, chiropractic treatment, medication management, activity modifications, interventional spine procedures.  Given that patient has not had any benefit with conservative treatments, I think patient would benefit from targeted interventional treatment modalities.    Independently reviewed and interpreted lumbar MRI from 2022-this showed multilevel degenerative changes with multilevel bilateral foraminal narrowing and mild canal stenosis.    Plan:    Given significantly worsening leg symptoms over the past year and prior MRI from over 2 years ago, recommend to obtain updated lumbar MRI before further options can be discussed.    Will start gabapentin trial at 300 mg nightly.  I advised the patient that gabapentin could cause lethargy and mental cloudiness. I advised the patient to call our office if they experience any side effects or issues with the medication changes. The patient verbalized an understanding.    Follow-up after lumbar MRI.    Reviewed notes from family medicine, physical therapy offices for review and interpretation of recent and prior relevant medical histories, treatment recommendations, medication and/or interventional treatment responses.    Reviewed renal function, CBC prior to recommending, initiating,  continuing and/or adjusting medications.    Reviewed hemoglobin A1c, renal function, CBC and/or PT/INR prior to discussing/offering interventional modalities.    [place] Prescription Drug Monitoring Program report was reviewed and was appropriate     My impressions and treatment recommendations were discussed in detail with the patient who verbalized understanding and had no further questions.  Discharge instructions were provided. I personally saw and examined the patient and I agree with the above discussed plan of care.    Orders Placed This Encounter   Procedures    MRI lumbar spine wo contrast     Standing Status:   Future     Standing Expiration Date:   10/1/2028     Scheduling Instructions:      There is no preparation for this test. Please leave your jewelry and valuables at home, wedding rings are the exception. All patients will be required to change into a hospital gown and pants.  Street clothes are not permitted in the MRI.  Magnetic nail polish must be removed prior to arrival for your test. Please bring your insurance cards, a form of photo ID and a list of your medications with you. Arrive 15 minutes prior to your appointment time in order to register. Please bring any prior CT or MRI studies of this area that were not performed at a [facility].            To schedule this appointment, please contact Central Scheduling at [phone].            Prior to your appointment, please make sure you complete the MRI Screening Form when you e-Check in for your appointment. This will be available starting 7 days before your appointment in [facility] [employer] [facility]. You may receive an e-mail with an activation code if you do not have a [facility] [employer] [facility] account. If you do not have access to a device, we will complete your screening at your appointment.     Order Specific Question:   Reason for Exam     Answer:   worsening bilateral lumbar radiculopathy     Order Specific Question:   What is the patient's sedation  requirement?     Answer:   No Sedation     Order Specific Question:   Does the patient need medication for Claustrophobia? If yes, order medication at this point.     Answer:   No     Order Specific Question:   Does the patient wear a life vest, have an implanted cardiac device, a stimulation device, a sleep apnea stimulator, or a breast tissue expansion device?     Answer:   No     Order Specific Question:   Release to patient through Mychart     Answer:   Immediate     New Medications Ordered This Visit   Medications    gabapentin (NEURONTIN) 300 mg capsule     Sig: Take 1 capsule (300 mg total) by mouth daily at bedtime     Dispense:  30 capsule     Refill:  2       History of Present Illness    Andriy Breen is a 74 y.o. male presenting for new patient visit at [facility] for exam and evaluation of chronic low back and radiating leg pain for greater than 1 year, worsening over the past several months. Pain started without any precipitating injury or trauma. Over the past month, the intensity of pain has been Severe. Pain is currently 10/10. Pain does interfere with age appropriate activities of daily living. Pain is constant, with no typical pattern throughout the day. Pain is described as cramping, throbbing, aching with numbness and pins/needles. Patient endorses weakness in the lower extremities. Assistance device used: None.    Worsening factors noted: Lying down, standing, sitting, walking.   Improving factors noted: None noted.    Treatments tried:   PT: yes  Chiropractic therapy: no  Injections: no   Previous spine surgery: No    Anticoagulation: no    Medications tried:   Lidocaine patch  Ibuprofen    I have personally reviewed and/or updated the patient's past medical history, past surgical history, family history, social history, current medications, allergies, and vital signs today.     Review of Systems   Constitutional:  Negative for chills and fever.   HENT:  Negative  for ear pain and sore throat.    Eyes:  Negative for pain and visual disturbance.   Respiratory:  Negative for cough and shortness of breath.    Cardiovascular:  Negative for chest pain and palpitations.   Gastrointestinal:  Negative for abdominal pain and vomiting.   Genitourinary:  Negative for dysuria and hematuria.   Musculoskeletal:  Positive for back pain, gait problem and myalgias. Negative for arthralgias.   Skin:  Negative for color change and rash.   Neurological:  Positive for weakness and numbness. Negative for seizures and syncope.   All other systems reviewed and are negative.      Patient Active Problem List   Diagnosis    Type 2 diabetes mellitus with diabetic mononeuropathy, without long-term current use of insulin (HCC)    Overactive bladder    Osteoarthritis of lumbar spine    Hyperlipidemia    Grade II internal hemorrhoids    Dilated cardiomyopathy (HCC)    Chronic GERD    BPH with obstruction/lower urinary tract symptoms    Bilateral carpal tunnel syndrome    Benign essential hypertension    Erectile dysfunction    Screening for diabetic retinopathy    Tinea pedis of right foot    Primary osteoarthritis of left shoulder    Sensorineural hearing loss (SNHL) of left ear with unrestricted hearing of right ear    Subacromial bursitis of left shoulder joint    Rotator cuff tendonitis, left    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HCC)    Nephrolithiasis    Onychomycosis of multiple toenails with type 2 diabetes mellitus  (HCC)    Chronic bilateral low back pain with bilateral sciatica    Benign essential tremor       Past Medical History:   Diagnosis Date    Abnormal echocardiogram 09/15/2017    Abnormal finding on MRI of brain 02/26/2020    Arthritis     OSTEOARTHRITIS OF LUMBAR SPINE    Brain tumor (HCC) 02/26/2020    Carpal tunnel syndrome     COVID     COVID-19 11/29/2022    Diabetes mellitus (HCC)     Elevated prostate specific antigen (PSA)     Erectile dysfunction     GERD (gastroesophageal reflux disease)     H.  pylori infection 07/17/2018    History of BPH     Hx of adenomatous colonic polyps     Hyperlipidemia     Hypertension     Hypertension        Past Surgical History:   Procedure Laterality Date    APPENDECTOMY      CARPAL TUNNEL RELEASE      COLONOSCOPY      CYSTOSCOPY W/ DILATION OF BLADDER  01/09/2017    DR. KILO LENZ, [facility]     EGD AND COLONOSCOPY N/A 2/8/2018    Procedure: EGD with biopsy AND COLONOSCOPY with polypectomy with hemo clip application;  Surgeon: Santana Paiz MD;  Location: [facility];  Service: Gastroenterology    NEUROPLASTY / TRANSPOSITION MEDIAN NERVE AT CARPAL TUNNEL      LEFT & RIGHT       Family History   Problem Relation Age of Onset    Heart disease Mother     Hypertension Mother     Ulcers Father     Stomach cancer Father     Diabetes Family     Hypertension Family     RAMY disease Family     Arthritis Family        Social History     Occupational History    Not on file   Tobacco Use    Smoking status: Never    Smokeless tobacco: Never   Vaping Use    Vaping status: Never Used   Substance and Sexual Activity    Alcohol use: Yes     Comment: occasional beer    Drug use: No    Sexual activity: Yes       Current Outpatient Medications on File Prior to Visit   Medication Sig    amLODIPine (NORVASC) 10 mg tablet Take 1 tablet (10 mg total) by mouth daily    candesartan (ATACAND) 32 MG tablet Take 1 tablet (32 mg total) by mouth daily    finasteride (PROSCAR) 5 mg tablet Take 1 tablet (5 mg total) by mouth daily    ibuprofen (MOTRIN) 600 mg tablet Take 1 tablet (600 mg total) by mouth every 8 (eight) hours as needed for mild pain    metFORMIN (GLUCOPHAGE) 1000 MG tablet Take 1 tablet (1,000 mg total) by mouth 2 (two) times a day with meals    omeprazole (PriLOSEC) 40 MG capsule Take 1 capsule (40 mg total) by mouth daily    rosuvastatin (CRESTOR) 20 MG tablet Take 1 tablet (20 mg total) by mouth daily    sildenafil (VIAGRA) 100 mg tablet Take 1 tablet (100 mg  "total) by mouth daily as needed for erectile dysfunction    tamsulosin (FLOMAX) 0.4 mg Take 1 capsule (0.4 mg total) by mouth daily with dinner    trospium chloride (SANCTURA) 20 mg tablet Take 1 tablet (20 mg total) by mouth 2 (two) times a day    linaGLIPtin 5 MG TABS Take 5 mg by mouth daily (Patient not taking: Reported on 10/1/2024)     No current facility-administered medications on file prior to visit.       No Known Allergies    Physical Exam    Ht 5' 3\" (1.6 m)   Wt 73.5 kg (162 lb)   BMI 28.70 kg/m²     Constitutional: normal, well developed, well nourished, alert, in no distress and non-toxic and no overt pain behavior.  Eyes: anicteric  HEENT: grossly intact  Neck: supple, symmetric, trachea midline and no masses   Pulmonary:even and unlabored  Cardiovascular:No edema or pitting edema present  Skin:Normal without rashes or lesions and well hydrated  Psychiatric:Mood and affect appropriate  Neurologic: Motor function is grossly intact with no focal neurologic deficits   Musculoskeletal: Limited lumbar spine range of motion.  Pain with extension and lateral flexion.    Imaging  MRI LUMBAR SPINE WITHOUT CONTRAST     INDICATION: Lumbago.     COMPARISON:  None.     TECHNIQUE:  Sagittal T1, sagittal T2, sagittal inversion recovery, axial T1 and axial T2, coronal T2.    IMAGE QUALITY:  Diagnostic     FINDINGS:     VERTEBRAL BODIES:  There is a transitional lumbosacral junction with partially sacralized L5.  Modic type II endplate degenerative signal at levels L3-4 and L4-5.  There is left-sided L5 superior endplate developing Schmorl's node.  Vertebral heights are   maintained.     SACRUM:  Normal signal within the sacrum. No evidence of insufficiency or stress fracture.     DISTAL CORD AND CONUS:  Normal size and signal within the distal cord and conus.     PARASPINAL SOFT TISSUES:  Paraspinal soft tissues are unremarkable.     LOWER THORACIC DISC SPACES:  Mild noncompressive lower thoracic degenerative " change.     LUMBAR DISC SPACES:     L1-L2:  Disc bulge.  Moderate bilateral facet arthropathy.  Mild canal stenosis.  Mild left foraminal stenosis.     L2-L3:  Disc bulge.  Moderate bilateral facet arthropathy.  Bilateral ligamentum flavum thickening.  Mild canal narrowing.  Mild bilateral foraminal stenosis.     L3-L4:  Minimal disc osteophyte complex.  Bilateral facet arthropathy with no significant canal stenosis.  Mild bilateral foraminal narrowing.     L4-L5:  Mild disc bulge with marginal osteophytes.  Mild bilateral facet arthropathy.  No significant canal stenosis.  Left greater than right moderate bilateral foraminal stenosis.     L5-S1:  Transitional level.  Unremarkable.     IMPRESSION:     1.  There is a transitional lumbosacral junction with partially sacralized L5.     2.  Degenerative changes of the lumbar spine without high-grade canal stenosis.  Mild canal narrowing pronounced at level L1-2.     3.  Multilevel foraminal narrowing pronounced at level L4-5 with left greater than right moderate bilateral foraminal narrowing.

## 2024-10-08 DIAGNOSIS — R35.0 URINARY FREQUENCY: ICD-10-CM

## 2024-10-08 RX ORDER — TROSPIUM CHLORIDE 20 MG/1
TABLET, FILM COATED ORAL
Qty: 180 TABLET | Refills: 0 | Status: SHIPPED | OUTPATIENT
Start: 2024-10-08

## 2024-10-10 ENCOUNTER — HOSPITAL ENCOUNTER (OUTPATIENT)
Dept: MRI IMAGING | Facility: HOSPITAL | Age: 74
Discharge: HOME | End: 2024-10-10
Attending: ANESTHESIOLOGY
Payer: COMMERCIAL

## 2024-10-10 DIAGNOSIS — M54.16 LUMBAR RADICULOPATHY: ICD-10-CM

## 2024-10-10 PROCEDURE — 72148 MRI LUMBAR SPINE W/O DYE: CPT

## 2024-10-13 DIAGNOSIS — N13.8 BPH WITH OBSTRUCTION/LOWER URINARY TRACT SYMPTOMS: ICD-10-CM

## 2024-10-13 DIAGNOSIS — N40.1 BPH WITH OBSTRUCTION/LOWER URINARY TRACT SYMPTOMS: ICD-10-CM

## 2024-10-15 RX ORDER — FINASTERIDE 5 MG/1
5 TABLET, FILM COATED ORAL DAILY
Qty: 100 TABLET | Refills: 1 | Status: SHIPPED | OUTPATIENT
Start: 2024-10-15

## 2024-10-18 ENCOUNTER — APPOINTMENT (OUTPATIENT)
Dept: LAB | Facility: HOSPITAL | Age: 74
End: 2024-10-18
Payer: COMMERCIAL

## 2024-10-18 DIAGNOSIS — N40.1 BPH WITH URINARY OBSTRUCTION: ICD-10-CM

## 2024-10-18 DIAGNOSIS — N13.8 BPH WITH URINARY OBSTRUCTION: ICD-10-CM

## 2024-10-18 LAB — PSA SERPL-MCNC: 0.55 NG/ML (ref 0–4)

## 2024-10-18 PROCEDURE — 84153 ASSAY OF PSA TOTAL: CPT

## 2024-10-18 PROCEDURE — 36415 COLL VENOUS BLD VENIPUNCTURE: CPT

## 2024-10-23 ENCOUNTER — OFFICE VISIT (OUTPATIENT)
Dept: PAIN MEDICINE | Facility: CLINIC | Age: 74
End: 2024-10-23
Payer: COMMERCIAL

## 2024-10-23 VITALS — BODY MASS INDEX: 28.7 KG/M2 | WEIGHT: 162 LBS | HEIGHT: 63 IN

## 2024-10-23 DIAGNOSIS — M54.16 LUMBAR RADICULOPATHY: Primary | ICD-10-CM

## 2024-10-23 PROCEDURE — 99214 OFFICE O/P EST MOD 30 MIN: CPT | Performed by: ANESTHESIOLOGY

## 2024-10-23 PROCEDURE — G2211 COMPLEX E/M VISIT ADD ON: HCPCS | Performed by: ANESTHESIOLOGY

## 2024-10-23 NOTE — PROGRESS NOTES
Assessment:  1. Lumbar radiculopathy      Encounter was conducted with [employer] . Patient was accompanied by spouse.    Patient presenting for follow up visit. He has a history of chronic back and bilateral right greater than left radiating leg pain for greater than 7 years, worsening over the past several years.     Pain is consistent with lumbar radicular pain accompanied by pain 10/10 on the pain scale with inability to participate in IADLs for >6 weeks. Patient has participated with physical therapy as well as home exercises and stretches.  Patient has tried ibuprofen with modest benefit.  Lidocaine patches provide no benefit  Denies any bowel or bladder incontinence, saddle anesthesia.      Independently reviewed and interpreted lumbar MRI recently obtained - this showed multilevel degenerative changes with a right foraminal and extra foraminal disc protrusion at L2-3 with mild canal stenosis.     Plan:     Discussed and offered L2-3 LESI based on symptom distribution with radicular pain, MRI findings and failure to improve with extensive conservative treatment options.    The procedures, its risks, and benefits were explained in detail to the patient. Risks include but are not limited to bleeding, infection, hematoma formation, abscess formation, weakness, headache, failure the pain to improve, nerve irritation or damage, and potential worsening of the pain.  The approach was demonstrated using models and literature was provided. The patient verbalized understanding and wished to proceed with the procedure.     Reviewed notes from family medicine, physical therapy offices for review and interpretation of recent and prior relevant medical histories, treatment recommendations, medication and/or interventional treatment responses.     Reviewed hemoglobin A1c, renal function, CBC and/or PT/INR prior to discussing/offering interventional modalities.     My impressions and treatment recommendations  were discussed in detail with the patient who verbalized understanding and had no further questions.  Discharge instructions were provided. I personally saw and examined the patient and I agree with the above discussed plan of care.    Orders Placed This Encounter   Procedures    FL spine and pain procedure     Standing Status:   Future     Standing Expiration Date:   10/23/2028     Order Specific Question:   Reason for Exam:     Answer:   L2-3 LESI     Order Specific Question:   Anticoagulant hold needed?     Answer:   no     No orders of the defined types were placed in this encounter.      History of Present Illness:  Andriy Breen is a 74 y.o. male who presents for a follow up office visit in regards to Back Pain.   The patient’s current symptoms include continued low back and bilateral radiating leg pain symptoms. Pain is rated 8/10 on the pain scale and described as a constant burning, aching, throbbing pain with numbness.    I have personally reviewed and/or updated the patient's past medical history, past surgical history, family history, social history, current medications, allergies, and vital signs today.     Review of Systems   Constitutional:  Negative for chills and fever.   HENT:  Negative for ear pain and sore throat.    Eyes:  Negative for pain and visual disturbance.   Respiratory:  Negative for cough and shortness of breath.    Cardiovascular:  Negative for chest pain and palpitations.   Gastrointestinal:  Negative for abdominal pain and vomiting.   Genitourinary:  Negative for dysuria and hematuria.   Musculoskeletal:  Positive for back pain, gait problem and myalgias. Negative for arthralgias.   Skin:  Negative for color change and rash.   Neurological:  Positive for weakness and numbness. Negative for seizures and syncope.   All other systems reviewed and are negative.      Patient Active Problem List   Diagnosis    Type 2 diabetes mellitus with diabetic mononeuropathy, without long-term  current use of insulin (HCC)    Overactive bladder    Osteoarthritis of lumbar spine    Hyperlipidemia    Grade II internal hemorrhoids    Dilated cardiomyopathy (HCC)    Chronic GERD    BPH with obstruction/lower urinary tract symptoms    Bilateral carpal tunnel syndrome    Benign essential hypertension    Erectile dysfunction    Screening for diabetic retinopathy    Tinea pedis of right foot    Primary osteoarthritis of left shoulder    Sensorineural hearing loss (SNHL) of left ear with unrestricted hearing of right ear    Subacromial bursitis of left shoulder joint    Rotator cuff tendonitis, left    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HCC)    Nephrolithiasis    Onychomycosis of multiple toenails with type 2 diabetes mellitus  (HCC)    Chronic bilateral low back pain with bilateral sciatica    Benign essential tremor       Past Medical History:   Diagnosis Date    Abnormal echocardiogram 09/15/2017    Abnormal finding on MRI of brain 02/26/2020    Arthritis     OSTEOARTHRITIS OF LUMBAR SPINE    Brain tumor (HCC) 02/26/2020    Carpal tunnel syndrome     COVID     COVID-19 11/29/2022    Diabetes mellitus (HCC)     Elevated prostate specific antigen (PSA)     Erectile dysfunction     GERD (gastroesophageal reflux disease)     H. pylori infection 07/17/2018    History of BPH     Hx of adenomatous colonic polyps     Hyperlipidemia     Hypertension     Hypertension        Past Surgical History:   Procedure Laterality Date    APPENDECTOMY      CARPAL TUNNEL RELEASE      COLONOSCOPY      CYSTOSCOPY W/ DILATION OF BLADDER  01/09/2017    DR. KILO LENZ, [facility]     EGD AND COLONOSCOPY N/A 2/8/2018    Procedure: EGD with biopsy AND COLONOSCOPY with polypectomy with hemo clip application;  Surgeon: Santana Paiz MD;  Location: [facility];  Service: Gastroenterology    NEUROPLASTY / TRANSPOSITION MEDIAN NERVE AT CARPAL TUNNEL      LEFT & RIGHT       Family History   Problem Relation Age of Onset    Heart disease  "Mother     Hypertension Mother     Ulcers Father     Stomach cancer Father     Diabetes Family     Hypertension Family     RAMY disease Family     Arthritis Family        Social History     Occupational History    Not on file   Tobacco Use    Smoking status: Never    Smokeless tobacco: Never   Vaping Use    Vaping status: Never Used   Substance and Sexual Activity    Alcohol use: Yes     Comment: occasional beer    Drug use: No    Sexual activity: Yes       Current Outpatient Medications on File Prior to Visit   Medication Sig    amLODIPine (NORVASC) 10 mg tablet Take 1 tablet (10 mg total) by mouth daily    candesartan (ATACAND) 32 MG tablet Take 1 tablet (32 mg total) by mouth daily    finasteride (PROSCAR) 5 mg tablet Take 1 tablet (5 mg total) by mouth daily    gabapentin (NEURONTIN) 300 mg capsule Take 1 capsule (300 mg total) by mouth daily at bedtime    ibuprofen (MOTRIN) 600 mg tablet Take 1 tablet (600 mg total) by mouth every 8 (eight) hours as needed for mild pain    metFORMIN (GLUCOPHAGE) 1000 MG tablet Take 1 tablet (1,000 mg total) by mouth 2 (two) times a day with meals    omeprazole (PriLOSEC) 40 MG capsule Take 1 capsule (40 mg total) by mouth daily    rosuvastatin (CRESTOR) 20 MG tablet Take 1 tablet (20 mg total) by mouth daily    sildenafil (VIAGRA) 100 mg tablet Take 1 tablet (100 mg total) by mouth daily as needed for erectile dysfunction    tamsulosin (FLOMAX) 0.4 mg Take 1 capsule (0.4 mg total) by mouth daily with dinner    trospium chloride (SANCTURA) 20 mg tablet TOME 1 TABLETA POR VIA ORAL DOS VECES AL LUIS ALBERTO    linaGLIPtin 5 MG TABS Take 5 mg by mouth daily (Patient not taking: Reported on 10/1/2024)     No current facility-administered medications on file prior to visit.       No Known Allergies    Physical Exam:    Ht 5' 3\" (1.6 m)   Wt 73.5 kg (162 lb)   BMI 28.70 kg/m²     Constitutional:normal, well developed, well nourished, alert, in no distress and non-toxic and no overt pain " behavior.  Eyes:anicteric  HEENT:grossly intact  Neck:supple, symmetric, trachea midline and no masses   Pulmonary:even and unlabored  Cardiovascular:No edema or pitting edema present  Skin:Normal without rashes or lesions and well hydrated  Psychiatric:Mood and affect appropriate  Neurologic: Motor function is grossly intact with no focal neurologic deficits   Musculoskeletal: limited lumbar spine ROM    Imaging  MRI lumbar spine  FINDINGS:     VERTEBRAL BODIES: There is a transitional lumbosacral junction. The L5 vertebral body is partially sacralized. Minimal dextroscoliosis at the thoracolumbar junction and levoscoliosis within the mid to lower lumbar spine. No spondylolisthesis or   spondylolysis. Multilevel thoracic and lumbar endplate marrow degenerative change. Chronic Schmorl's nodes are seen at multiple levels, the largest of which is seen within the posterior aspect of the L3 vertebral body.     SACRUM:  Normal signal within the sacrum. No evidence of insufficiency or stress fracture.     DISTAL CORD AND CONUS:  Normal size and signal within the distal cord and conus.     PARASPINAL SOFT TISSUES:  Paraspinal soft tissues are unremarkable.     LOWER THORACIC DISC SPACES: Minor lower thoracic degenerative change. No disc herniation, canal stenosis or foraminal narrowing.     LUMBAR DISC SPACES:     L1-L2: Slight loss of disc height with mild annular bulging. There is a small left foraminal and extraforaminal disc protrusion with mildly asymmetric endplate hypertrophic degenerative change. Mild central canal stenosis without cauda equina   impingement. No foraminal narrowing.     L2-L3: Disc desiccation and loss of disc height. Circumferential annular bulging with a small broad-based right foraminal and extraforaminal disc protrusion. Mild facet degenerative change as well. Mild canal stenosis without cauda equina impingement.   Mild right-sided foraminal narrowing without nerve compression.     L3-L4: Mild  annular bulging and facet arthropathy. No disc herniation, canal stenosis or foraminal narrowing.     L4-L5: Disc desiccation and loss of disc height. Mild circumferential annular bulging and foraminal endplate hypertrophic degenerative change. Slight facet arthropathy. There is no canal stenosis. Mild left greater than right foraminal narrowing is   unchanged.     L5-S1: The L5 vertebral body is significantly sacralized bilaterally with only a small rudimentary disc. No canal stenosis or foraminal narrowing.     OTHER FINDINGS:  None.     IMPRESSION:     Stable transitional lumbosacral junction with partial sacralization of L5. Vertebral bodies are numbered on sagittal T2 weighted imaging for further reference.     Annular bulging with endplate and posterior element hypertrophic change at multiple levels. Some of the annular bulging is asymmetric. Overall there is multilevel mild canal stenosis and foraminal narrowing most prominent on the left at L4-5, stable.

## 2024-10-31 DIAGNOSIS — N20.0 NEPHROLITHIASIS: ICD-10-CM

## 2024-10-31 RX ORDER — TAMSULOSIN HYDROCHLORIDE 0.4 MG/1
CAPSULE ORAL
Qty: 90 CAPSULE | Refills: 1 | Status: SHIPPED | OUTPATIENT
Start: 2024-10-31

## 2024-11-07 ENCOUNTER — HOSPITAL ENCOUNTER (OUTPATIENT)
Dept: RADIOLOGY | Facility: MEDICAL CENTER | Age: 74
End: 2024-11-07
Payer: COMMERCIAL

## 2024-11-07 VITALS
TEMPERATURE: 97.8 F | RESPIRATION RATE: 16 BRPM | SYSTOLIC BLOOD PRESSURE: 148 MMHG | HEART RATE: 79 BPM | OXYGEN SATURATION: 97 % | DIASTOLIC BLOOD PRESSURE: 82 MMHG

## 2024-11-07 DIAGNOSIS — M54.16 LUMBAR RADICULOPATHY: ICD-10-CM

## 2024-11-07 PROCEDURE — 62323 NJX INTERLAMINAR LMBR/SAC: CPT | Performed by: ANESTHESIOLOGY

## 2024-11-07 RX ORDER — BUPIVACAINE HCL/PF 2.5 MG/ML
1 VIAL (ML) INJECTION ONCE
Status: COMPLETED | OUTPATIENT
Start: 2024-11-07 | End: 2024-11-07

## 2024-11-07 RX ORDER — METHYLPREDNISOLONE ACETATE 80 MG/ML
80 INJECTION, SUSPENSION INTRA-ARTICULAR; INTRALESIONAL; INTRAMUSCULAR; PARENTERAL; SOFT TISSUE ONCE
Status: COMPLETED | OUTPATIENT
Start: 2024-11-07 | End: 2024-11-07

## 2024-11-07 RX ADMIN — BUPIVACAINE HYDROCHLORIDE 1 ML: 2.5 INJECTION, SOLUTION EPIDURAL; INFILTRATION; INTRACAUDAL at 10:28

## 2024-11-07 RX ADMIN — IOHEXOL 1 ML: 300 INJECTION, SOLUTION INTRAVENOUS at 10:28

## 2024-11-07 RX ADMIN — METHYLPREDNISOLONE ACETATE 80 MG: 80 INJECTION, SUSPENSION INTRA-ARTICULAR; INTRALESIONAL; INTRAMUSCULAR; PARENTERAL; SOFT TISSUE at 10:28

## 2024-11-07 NOTE — H&P
History of Present Illness: The patient is a 74 y.o. male who presents with complaints of back pain    Past Medical History:   Diagnosis Date    Abnormal echocardiogram 09/15/2017    Abnormal finding on MRI of brain 02/26/2020    Arthritis     OSTEOARTHRITIS OF LUMBAR SPINE    Brain tumor (HCC) 02/26/2020    Carpal tunnel syndrome     COVID     COVID-19 11/29/2022    Diabetes mellitus (HCC)     Elevated prostate specific antigen (PSA)     Erectile dysfunction     GERD (gastroesophageal reflux disease)     H. pylori infection 07/17/2018    History of BPH     Hx of adenomatous colonic polyps     Hyperlipidemia     Hypertension     Hypertension        Past Surgical History:   Procedure Laterality Date    APPENDECTOMY      CARPAL TUNNEL RELEASE      COLONOSCOPY      CYSTOSCOPY W/ DILATION OF BLADDER  01/09/2017    DR. KILO LENZ, [facility]     EGD AND COLONOSCOPY N/A 2/8/2018    Procedure: EGD with biopsy AND COLONOSCOPY with polypectomy with hemo clip application;  Surgeon: Santana Paiz MD;  Location: [facility];  Service: Gastroenterology    NEUROPLASTY / TRANSPOSITION MEDIAN NERVE AT CARPAL TUNNEL      LEFT & RIGHT         Current Outpatient Medications:     amLODIPine (NORVASC) 10 mg tablet, Take 1 tablet (10 mg total) by mouth daily, Disp: 90 tablet, Rfl: 1    candesartan (ATACAND) 32 MG tablet, Take 1 tablet (32 mg total) by mouth daily, Disp: 90 tablet, Rfl: 1    finasteride (PROSCAR) 5 mg tablet, Take 1 tablet (5 mg total) by mouth daily, Disp: 100 tablet, Rfl: 1    gabapentin (NEURONTIN) 300 mg capsule, Take 1 capsule (300 mg total) by mouth daily at bedtime, Disp: 30 capsule, Rfl: 2    ibuprofen (MOTRIN) 600 mg tablet, Take 1 tablet (600 mg total) by mouth every 8 (eight) hours as needed for mild pain, Disp: 90 tablet, Rfl: 1    linaGLIPtin 5 MG TABS, Take 5 mg by mouth daily (Patient not taking: Reported on 10/1/2024), Disp: 90 tablet, Rfl: 1    metFORMIN (GLUCOPHAGE) 1000 MG  tablet, Take 1 tablet (1,000 mg total) by mouth 2 (two) times a day with meals, Disp: 180 tablet, Rfl: 1    omeprazole (PriLOSEC) 40 MG capsule, Take 1 capsule (40 mg total) by mouth daily, Disp: 90 capsule, Rfl: 0    rosuvastatin (CRESTOR) 20 MG tablet, Take 1 tablet (20 mg total) by mouth daily, Disp: 100 tablet, Rfl: 1    sildenafil (VIAGRA) 100 mg tablet, Take 1 tablet (100 mg total) by mouth daily as needed for erectile dysfunction, Disp: 3 tablet, Rfl: 5    tamsulosin (FLOMAX) 0.4 mg, TOME 1 CAPSULA POR VIA ORAL TODOS LOS ESCOBAR WITH DINNER, Disp: 90 capsule, Rfl: 1    trospium chloride (SANCTURA) 20 mg tablet, TOME 1 TABLETA POR VIA ORAL DOS VECES AL LUIS ALBERTO, Disp: 180 tablet, Rfl: 0    Current Facility-Administered Medications:     bupivacaine (PF) (MARCAINE) 0.25 % injection 1 mL, 1 mL, Epidural, Once, Will Coni Benavidez MD    iohexol (OMNIPAQUE) 300 mg/mL injection 1 mL, 1 mL, Epidural, Once, Will Coni Benavidez MD    methylPREDNISolone acetate (DEPO-MEDROL) injection 80 mg, 80 mg, Epidural, Once, Will Coni Benavidez MD    No Known Allergies    Physical Exam:   Vitals:    11/07/24 1011   BP: 125/72   Pulse: 83   Resp: 20   Temp: 97.8 °F (36.6 °C)   SpO2: 97%     General: Awake, Alert, Oriented x 3, Mood and affect appropriate  Respiratory: Respirations even and unlabored  Cardiovascular: Peripheral pulses intact; no edema  Musculoskeletal Exam: back pain    ASA Score: 3    Patient/Chart Verification  Patient ID Verified: Verbal  ID Band Applied: No  Consents Confirmed: Procedural, To be obtained in the Pre-Procedure area  H&P( within 30 days) Verified: To be obtained in the Procedural area  Interval H&P(within 24 hr) Complete (required for Outpatients and Surgery Admit only): To be obtained in the Procedural area  Allergies Reviewed: Yes  Anticoag/NSAID held?: No  Currently on antibiotics?: No    Assessment:   1. Lumbar radiculopathy        Plan: L2-3 LESI

## 2024-11-07 NOTE — DISCHARGE INSTRUCTIONS
Epidural Steroid Injection   WHAT YOU NEED TO KNOW:   An epidural steroid injection (HEATHER) is a procedure to inject steroid medicine into the epidural space. The epidural space is between your spinal cord and vertebrae. Steroids reduce inflammation and fluid buildup in your spine that may be causing pain. You may be given pain medicine along with the steroids.          ACTIVITY  Do not drive or operate machinery today.  No strenuous activity today - bending, lifting, etc.  You may resume normal activites starting tomorrow - start slowly and as tolerated.  You may shower today, but no tub baths or hot tubs.  You may have numbness for several hours from the local anesthetic. Please use caution and common sense, especially with weight-bearing activities.    CARE OF THE INJECTION SITE  If you have soreness or pain, apply ice to the area today (20 minutes on/20 minutes off).  Starting tomorrow, you may use warm, moist heat or ice if needed.  You may have an increase or change in your discomfort for 36-48 hours after your treatment.  Apply ice and continue with any pain medication you have been prescribed.  Notify [facility] if you have any of the following: redness, drainage, swelling, headache, stiff neck or fever above 100°F.    SPECIAL INSTRUCTIONS  Our office will contact you in approximately 14 days for a progress report.    MEDICATIONS  Continue to take all routine medications.  Our office may have instructed you to hold some medications.    As no general anesthesia was used in today's procedure, you should not experience any side effects related to anesthesia.     If you are diabetic, the steroids used in today's injection may temporarily increase your blood sugar levels after the first few days after your injection. Please keep a close eye on your sugars and alert the doctor who manages your diabetes if your sugars are significantly high from your baseline or you are symptomatic.     If you have a  problem specifically related to your procedure, please call our office at [phone].  Problems not related to your procedure should be directed to your primary care physician.  INSTRUCCIONES PARA EL CRYSTAL DE CATHY INYECCIÓN EPIDURAL DE ESTEROIDES    ACTIVIDAD   No conduzca ni opere maquinarias por hoy.   No realice actividades extenuantes por hoy, sandee agacharse, levantar objetos, etc.   Puede retomar svitlana actividades normales desde mañana. Comience progresivamente y en la medida que lo tolere.   Puede ducharse, camilla no se bañe en tinas ni en jacuzzis por hoy.   Puede sentir entumecimiento yoselin varias horas debido a la anestesia local. Sea precavido y use el sentido común, en especial con las actividades que implican la carga de peso.    CUIDADO DEL ÁREA DE APLICACIÓN DE LA INYECCIÓN   Si siente molestias o dolor, aplique hielo en el área por hilda (colóquelo yoselin 20 minutos y retírelo yoselin otros 20 minutos).   A partir de mañana, podrá aplicar calor húmedo o hielo, si lo necesita.   Puede experimentar un aumento o cambio en el malestar yoselin las 36-48 horas posteriores al tratamiento. Aplique hielo y continúe tomando cualquier analgésico que le hayan recetado.   Informe a [facility] si se presenta alguno de estos síntomas: enrojecimiento, secreción, inflamación, dolor de ildefonso, rigidez de jody o fiebre superior a 100 °F.    INSTRUCCIONES ESPECIALES  Se pondrán en contacto de nuestro consultorio con usted en aproximadamente 14 días para pedir un informe de progreso.    MEDICAMENTOS   Continúe tomando todos svitlana medicamentos de rutina.   Es posible que en nuestro consultorio le hayan indicado que deje de jordana algunos medicamentos.   Puede volver a tomarlos el:              Si tiene algún problema relacionado específicamente con el procedimiento, llame a nuestro consultorio al [phone]. Si tiene problemas que no están relacionados con romeo procedimiento, debe comunicarse con romeo médico  anil hutson.

## 2024-11-21 ENCOUNTER — TELEPHONE (OUTPATIENT)
Dept: PAIN MEDICINE | Facility: CLINIC | Age: 74
End: 2024-11-21

## 2024-11-26 ENCOUNTER — APPOINTMENT (OUTPATIENT)
Dept: LAB | Facility: HOSPITAL | Age: 74
End: 2024-11-26
Payer: COMMERCIAL

## 2024-11-26 DIAGNOSIS — M54.42 CHRONIC BILATERAL LOW BACK PAIN WITH BILATERAL SCIATICA: ICD-10-CM

## 2024-11-26 DIAGNOSIS — G89.29 CHRONIC BILATERAL LOW BACK PAIN WITH BILATERAL SCIATICA: ICD-10-CM

## 2024-11-26 DIAGNOSIS — E11.41 TYPE 2 DIABETES MELLITUS WITH DIABETIC MONONEUROPATHY, WITHOUT LONG-TERM CURRENT USE OF INSULIN (HCC): ICD-10-CM

## 2024-11-26 DIAGNOSIS — M47.816 SPONDYLOSIS OF LUMBAR REGION WITHOUT MYELOPATHY OR RADICULOPATHY: ICD-10-CM

## 2024-11-26 DIAGNOSIS — M54.41 CHRONIC BILATERAL LOW BACK PAIN WITH BILATERAL SCIATICA: ICD-10-CM

## 2024-11-26 DIAGNOSIS — I10 BENIGN ESSENTIAL HYPERTENSION: ICD-10-CM

## 2024-11-26 LAB
ALBUMIN SERPL BCG-MCNC: 4.2 G/DL (ref 3.5–5)
ALP SERPL-CCNC: 59 U/L (ref 34–104)
ALT SERPL W P-5'-P-CCNC: 12 U/L (ref 7–52)
ANION GAP SERPL CALCULATED.3IONS-SCNC: 6 MMOL/L (ref 4–13)
AST SERPL W P-5'-P-CCNC: 11 U/L (ref 13–39)
BASOPHILS # BLD AUTO: 0.05 THOUSANDS/ΜL (ref 0–0.1)
BASOPHILS NFR BLD AUTO: 0 % (ref 0–1)
BILIRUB SERPL-MCNC: 0.51 MG/DL (ref 0.2–1)
BUN SERPL-MCNC: 28 MG/DL (ref 5–25)
CALCIUM SERPL-MCNC: 9.6 MG/DL (ref 8.4–10.2)
CHLORIDE SERPL-SCNC: 104 MMOL/L (ref 96–108)
CHOLEST SERPL-MCNC: 120 MG/DL (ref ?–200)
CO2 SERPL-SCNC: 26 MMOL/L (ref 21–32)
CREAT SERPL-MCNC: 0.87 MG/DL (ref 0.6–1.3)
CREAT UR-MCNC: 146.3 MG/DL
EOSINOPHIL # BLD AUTO: 0.1 THOUSAND/ΜL (ref 0–0.61)
EOSINOPHIL NFR BLD AUTO: 1 % (ref 0–6)
ERYTHROCYTE [DISTWIDTH] IN BLOOD BY AUTOMATED COUNT: 14.6 % (ref 11.6–15.1)
EST. AVERAGE GLUCOSE BLD GHB EST-MCNC: 148 MG/DL
GFR SERPL CREATININE-BSD FRML MDRD: 85 ML/MIN/1.73SQ M
GLUCOSE P FAST SERPL-MCNC: 128 MG/DL (ref 65–99)
HBA1C MFR BLD: 6.8 %
HCT VFR BLD AUTO: 42.4 % (ref 36.5–49.3)
HDLC SERPL-MCNC: 49 MG/DL
HGB BLD-MCNC: 13.9 G/DL (ref 12–17)
IMM GRANULOCYTES # BLD AUTO: 0.05 THOUSAND/UL (ref 0–0.2)
IMM GRANULOCYTES NFR BLD AUTO: 0 % (ref 0–2)
LDLC SERPL CALC-MCNC: 43 MG/DL (ref 0–100)
LYMPHOCYTES # BLD AUTO: 4.17 THOUSANDS/ΜL (ref 0.6–4.47)
LYMPHOCYTES NFR BLD AUTO: 35 % (ref 14–44)
MCH RBC QN AUTO: 29.3 PG (ref 26.8–34.3)
MCHC RBC AUTO-ENTMCNC: 32.8 G/DL (ref 31.4–37.4)
MCV RBC AUTO: 89 FL (ref 82–98)
MICROALBUMIN UR-MCNC: 13.1 MG/L
MICROALBUMIN/CREAT 24H UR: 9 MG/G CREATININE (ref 0–30)
MONOCYTES # BLD AUTO: 0.58 THOUSAND/ΜL (ref 0.17–1.22)
MONOCYTES NFR BLD AUTO: 5 % (ref 4–12)
NEUTROPHILS # BLD AUTO: 7.15 THOUSANDS/ΜL (ref 1.85–7.62)
NEUTS SEG NFR BLD AUTO: 59 % (ref 43–75)
NONHDLC SERPL-MCNC: 71 MG/DL
NRBC BLD AUTO-RTO: 0 /100 WBCS
PLATELET # BLD AUTO: 208 THOUSANDS/UL (ref 149–390)
PMV BLD AUTO: 9.7 FL (ref 8.9–12.7)
POTASSIUM SERPL-SCNC: 3.8 MMOL/L (ref 3.5–5.3)
PROT SERPL-MCNC: 7.2 G/DL (ref 6.4–8.4)
RBC # BLD AUTO: 4.75 MILLION/UL (ref 3.88–5.62)
SODIUM SERPL-SCNC: 136 MMOL/L (ref 135–147)
TRIGL SERPL-MCNC: 141 MG/DL (ref ?–150)
WBC # BLD AUTO: 12.1 THOUSAND/UL (ref 4.31–10.16)

## 2024-11-26 PROCEDURE — 36415 COLL VENOUS BLD VENIPUNCTURE: CPT

## 2024-11-26 PROCEDURE — 80053 COMPREHEN METABOLIC PANEL: CPT

## 2024-11-26 PROCEDURE — 83036 HEMOGLOBIN GLYCOSYLATED A1C: CPT

## 2024-11-26 PROCEDURE — 82570 ASSAY OF URINE CREATININE: CPT

## 2024-11-26 PROCEDURE — 82043 UR ALBUMIN QUANTITATIVE: CPT

## 2024-11-26 PROCEDURE — 85025 COMPLETE CBC W/AUTO DIFF WBC: CPT

## 2024-11-26 PROCEDURE — 80061 LIPID PANEL: CPT

## 2024-11-27 ENCOUNTER — RA CDI HCC (OUTPATIENT)
Dept: OTHER | Facility: HOSPITAL | Age: 74
End: 2024-11-27

## 2024-12-02 ENCOUNTER — OFFICE VISIT (OUTPATIENT)
Age: 74
End: 2024-12-02
Payer: COMMERCIAL

## 2024-12-02 VITALS
OXYGEN SATURATION: 96 % | HEIGHT: 64 IN | BODY MASS INDEX: 29.78 KG/M2 | WEIGHT: 174.4 LBS | SYSTOLIC BLOOD PRESSURE: 126 MMHG | DIASTOLIC BLOOD PRESSURE: 68 MMHG | TEMPERATURE: 98.7 F | HEART RATE: 91 BPM

## 2024-12-02 DIAGNOSIS — D43.2 SUBEPENDYMOMA (HCC): ICD-10-CM

## 2024-12-02 DIAGNOSIS — K59.00 CONSTIPATION, UNSPECIFIED CONSTIPATION TYPE: ICD-10-CM

## 2024-12-02 DIAGNOSIS — Z00.00 MEDICARE ANNUAL WELLNESS VISIT, SUBSEQUENT: Primary | ICD-10-CM

## 2024-12-02 DIAGNOSIS — E78.1 PURE HYPERTRIGLYCERIDEMIA: ICD-10-CM

## 2024-12-02 DIAGNOSIS — E11.41 TYPE 2 DIABETES MELLITUS WITH DIABETIC MONONEUROPATHY, WITHOUT LONG-TERM CURRENT USE OF INSULIN (HCC): ICD-10-CM

## 2024-12-02 DIAGNOSIS — E11.9 TYPE 2 DIABETES MELLITUS WITHOUT COMPLICATION, WITHOUT LONG-TERM CURRENT USE OF INSULIN (HCC): ICD-10-CM

## 2024-12-02 DIAGNOSIS — D17.0 LIPOMA OF NECK: ICD-10-CM

## 2024-12-02 DIAGNOSIS — I10 BENIGN ESSENTIAL HYPERTENSION: ICD-10-CM

## 2024-12-02 PROCEDURE — G0439 PPPS, SUBSEQ VISIT: HCPCS | Performed by: INTERNAL MEDICINE

## 2024-12-02 PROCEDURE — 99214 OFFICE O/P EST MOD 30 MIN: CPT | Performed by: INTERNAL MEDICINE

## 2024-12-02 RX ORDER — POLYETHYLENE GLYCOL 3350 17 G/17G
17 POWDER, FOR SOLUTION ORAL DAILY
Qty: 30 EACH | Refills: 1 | Status: SHIPPED | OUTPATIENT
Start: 2024-12-02

## 2024-12-02 RX ORDER — AMLODIPINE BESYLATE 10 MG/1
10 TABLET ORAL DAILY
Qty: 100 TABLET | Refills: 1 | Status: SHIPPED | OUTPATIENT
Start: 2024-12-02

## 2024-12-02 NOTE — ASSESSMENT & PLAN NOTE
Stable and controlled on current medications.  Patient has received intermittent steroid injections in his lower back with improvement  Lab Results   Component Value Date    HGBA1C 6.8 (H) 11/26/2024       Orders:    metFORMIN (GLUCOPHAGE) 1000 MG tablet; Take 1 tablet (1,000 mg total) by mouth 2 (two) times a day with meals    CBC and differential; Future    Comprehensive metabolic panel; Future    Hemoglobin A1C; Future    Albumin / creatinine urine ratio; Future    Lipid panel; Future

## 2024-12-02 NOTE — ASSESSMENT & PLAN NOTE
Blood pressure is nicely controlled on current medications.  No changes    Orders:    amLODIPine (NORVASC) 10 mg tablet; Take 1 tablet (10 mg total) by mouth daily    CBC and differential; Future    Comprehensive metabolic panel; Future

## 2024-12-02 NOTE — ASSESSMENT & PLAN NOTE
Reasonably well-controlled type 2 diabetes with A1c at 6.8%.  No changes to current medication  Lab Results   Component Value Date    HGBA1C 6.8 (H) 11/26/2024       Orders:    linaGLIPtin 5 MG TABS; Take 5 mg by mouth daily    CBC and differential; Future    Comprehensive metabolic panel; Future    Hemoglobin A1C; Future    Albumin / creatinine urine ratio; Future    Lipid panel; Future

## 2024-12-02 NOTE — ASSESSMENT & PLAN NOTE
Recommended the use of MiraLAX powder as needed    Orders:    polyethylene glycol (MIRALAX) 17 g packet; Take 17 g by mouth daily

## 2024-12-02 NOTE — ASSESSMENT & PLAN NOTE
Continues on rosuvastatin 20 mg daily for both his primary problem and his type 2 diabetes    Orders:    Lipid panel; Future

## 2024-12-02 NOTE — PROGRESS NOTES
Name: Andriy Breen      : 1950      MRN: [id number]  Encounter Provider: Lenny Pires MD  Encounter Date: 2024   Encounter department: [facility] [place]    Assessment & Plan  Benign essential hypertension  Blood pressure is nicely controlled on current medications.  No changes    Orders:    amLODIPine (NORVASC) 10 mg tablet; Take 1 tablet (10 mg total) by mouth daily    CBC and differential; Future    Comprehensive metabolic panel; Future    Type 2 diabetes mellitus with diabetic mononeuropathy, without long-term current use of insulin ([facility])  Reasonably well-controlled type 2 diabetes with A1c at 6.8%.  No changes to current medication  Lab Results   Component Value Date    HGBA1C 6.8 (H) 2024       Orders:    linaGLIPtin 5 MG TABS; Take 5 mg by mouth daily    CBC and differential; Future    Comprehensive metabolic panel; Future    Hemoglobin A1C; Future    Albumin / creatinine urine ratio; Future    Lipid panel; Future    Type 2 diabetes mellitus without complication, without long-term current use of insulin ([facility])  Stable and controlled on current medications.  Patient has received intermittent steroid injections in his lower back with improvement  Lab Results   Component Value Date    HGBA1C 6.8 (H) 2024       Orders:    metFORMIN (GLUCOPHAGE) 1000 MG tablet; Take 1 tablet (1,000 mg total) by mouth 2 (two) times a day with meals    CBC and differential; Future    Comprehensive metabolic panel; Future    Hemoglobin A1C; Future    Albumin / creatinine urine ratio; Future    Lipid panel; Future    Constipation, unspecified constipation type  Recommended the use of MiraLAX powder as needed    Orders:    polyethylene glycol (MIRALAX) 17 g packet; Take 17 g by mouth daily    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HCC)         Pure hypertriglyceridemia  Continues on rosuvastatin 20 mg daily for both his primary problem and his type 2 diabetes    Orders:    Lipid panel;  Future    Lipoma of neck  Currently asymptomatic.  Will continue to monitor.  Should it begin to bother the patient we will refer to general surgery.         Medicare annual wellness visit, subsequent  Patient is up-to-date with age-appropriate screenings and immunizations.            Preventive health issues were discussed with patient, and age appropriate screening tests were ordered as noted in patient's After Visit Summary. Personalized health advice and appropriate referrals for health education or preventive services given if needed, as noted in patient's After Visit Summary.    History of Present Illness     Patient presents to the office for follow-up visit along with a Medicare wellness visit.  He recently had an epidural steroid injection administered for chronic back pain and notes some improvement.  He experiences intermittent constipation and is wondering if there is something that he can take for that.  In October of this year he experienced a urinary tract infection with E. coli.  He was treated with antibiotics and it resolved.  He did see urology and they had taken him off of oxybutynin and placed him on [place].  His PVRs were low less than 15.  He does report some improvement in his symptoms but it is minimal.  Reports no chest pain.  No headaches, no nausea or vomiting.  He due for follow-up MRI of his subependymoma.  He reports no symptoms.       Patient Care Team:  Lenny Pires MD as PCP - General  Lenny Pires MD as PCP - PCP-[facility]-MD Santana Bhatia MD as Endoscopist    Review of Systems   Constitutional: Negative.  Negative for activity change, appetite change, chills, diaphoresis, fatigue, fever and unexpected weight change.   HENT: Negative.     Eyes: Negative.    Respiratory: Negative.     Cardiovascular: Negative.    Gastrointestinal: Negative.    Endocrine: Negative.    Genitourinary: Negative.    Musculoskeletal:  Negative.    Skin: Negative.    Neurological: Negative.    Hematological: Negative.    Psychiatric/Behavioral:  The patient is not nervous/anxious.      Medical History Reviewed by provider this encounter:       Annual Wellness Visit Questionnaire   Andriy is here for his Subsequent Wellness visit.     Health Risk Assessment:   Patient rates overall health as fair. Patient feels that their physical health rating is slightly better. Patient is satisfied with their life. Eyesight was rated as same. Hearing was rated as same. Patient feels that their emotional and mental health rating is same. Patients states they are sometimes angry. Patient states they are sometimes unusually tired/fatigued. Pain experienced in the last 7 days has been some. Patient's pain rating has been 5/10. Patient states that he has experienced no weight loss or gain in last 6 months.     Depression Screening:   PHQ-2 Score: 0      Fall Risk Screening:   In the past year, patient has experienced: no history of falling in past year      Home Safety:  Patient does not have trouble with stairs inside or outside of their home. Patient has working smoke alarms and has working carbon monoxide detector. Home safety hazards include: none.     Nutrition:   Current diet is Regular.     Medications:   Patient is not currently taking any over-the-counter supplements. Patient is able to manage medications.     Activities of Daily Living (ADLs)/Instrumental Activities of Daily Living (IADLs):   Walk and transfer into and out of bed and chair?: Yes  Dress and groom yourself?: Yes    Bathe or shower yourself?: Yes    Feed yourself? Yes  Do your laundry/housekeeping?: No  Manage your money, pay your bills and track your expenses?: Yes  Make your own meals?: No    Do your own shopping?: Yes    Durable Medical Equipment Suppliers  no    Previous Hospitalizations:   Any hospitalizations or ED visits within the last 12 months?: Yes    How many hospitalizations have  you had in the last year?: 1-2    Advance Care Planning:   Living will: Yes    Durable POA for healthcare: Yes    Advanced directive: Yes    Advanced directive counseling given: Yes    Five wishes given: No    Patient declined ACP directive: No    End of Life Decisions reviewed with patient: Yes    Provider agrees with end of life decisions: Yes      Cognitive Screening:   Provider or family/friend/caregiver concerned regarding cognition?: No    PREVENTIVE SCREENINGS      Cardiovascular Screening:    General: Screening Not Indicated, History Lipid Disorder and Screening Current      Diabetes Screening:     General: Screening Not Indicated, History Diabetes and Screening Current      Colorectal Cancer Screening:     General: Screening Current      Prostate Cancer Screening:    General: Screening Current      Osteoporosis Screening:    General: Screening Not Indicated      Abdominal Aortic Aneurysm (AAA) Screening:    Risk factors include: age between 65-76 yo        General: Screening Not Indicated      Lung Cancer Screening:     General: Screening Not Indicated      Hepatitis C Screening:    General: Screening Current    Screening, Brief Intervention, and Referral to Treatment (SBIRT)    Screening  Typical number of drinks in a day: 0  Typical number of drinks in a week: 0  Interpretation: Low risk drinking behavior.    Single Item Drug Screening:  How often have you used an illegal drug (including marijuana) or a prescription medication for non-medical reasons in the past year? never    Single Item Drug Screen Score: 0  Interpretation: Negative screen for possible drug use disorder    Social Drivers of Health     Financial Resource Strain: Low Risk  (11/30/2023)    Overall Financial Resource Strain (CARDIA)     Difficulty of Paying Living Expenses: Not very hard   Food Insecurity: No Food Insecurity (12/2/2024)    Hunger Vital Sign     Worried About Running Out of Food in the Last Year: Never true     Ran Out of  "Food in the Last Year: Never true   Transportation Needs: No Transportation Needs (12/2/2024)    PRAPARE - Transportation     Lack of Transportation (Medical): No     Lack of Transportation (Non-Medical): No   Housing Stability: Low Risk  (12/2/2024)    Housing Stability Vital Sign     Unable to Pay for Housing in the Last Year: No     Number of Times Moved in the Last Year: 0     Homeless in the Last Year: No   Utilities: Not At Risk (12/2/2024)    [facility] Utilities     Threatened with loss of utilities: No     No results found.    Objective   /68 (BP Location: Left arm, Patient Position: Sitting, Cuff Size: Standard)   Pulse 91   Temp 98.7 °F (37.1 °C) (Temporal)   Ht 5' 3.7\" (1.618 m)   Wt 79.1 kg (174 lb 6.4 oz)   SpO2 96%   BMI 30.22 kg/m²     Physical Exam  Vitals reviewed.   Constitutional:       Appearance: He is well-developed and normal weight.   HENT:      Head: Normocephalic and atraumatic.      Right Ear: External ear normal.      Left Ear: External ear normal.      Nose: Nose normal.      Mouth/Throat:      Mouth: Mucous membranes are moist.   Eyes:      General: No scleral icterus.     Conjunctiva/sclera: Conjunctivae normal.      Pupils: Pupils are equal, round, and reactive to light.   Neck:      Vascular: No carotid bruit.   Cardiovascular:      Rate and Rhythm: Normal rate and regular rhythm.      Pulses: Normal pulses. no weak pulses.           Dorsalis pedis pulses are 2+ on the right side and 2+ on the left side.        Posterior tibial pulses are 2+ on the right side and 2+ on the left side.      Heart sounds: Normal heart sounds. No murmur heard.  Pulmonary:      Effort: Pulmonary effort is normal. No respiratory distress.      Breath sounds: Normal breath sounds.   Abdominal:      General: Abdomen is flat. Bowel sounds are normal. There is no distension.      Palpations: Abdomen is soft.   Musculoskeletal:         General: No swelling, tenderness or deformity. Normal range of motion. "      Cervical back: Normal range of motion and neck supple.      Right lower leg: No edema.      Left lower leg: No edema.   Feet:      Right foot:      Skin integrity: No ulcer, skin breakdown, erythema, warmth, callus or dry skin.      Left foot:      Skin integrity: No ulcer, skin breakdown, erythema, warmth, callus or dry skin.   Skin:     General: Skin is warm and dry.      Capillary Refill: Capillary refill takes less than 2 seconds.      Coloration: Skin is not jaundiced.      Findings: No bruising, erythema or rash.   Neurological:      General: No focal deficit present.      Mental Status: He is alert and oriented to person, place, and time. Mental status is at baseline.   Psychiatric:         Mood and Affect: Mood normal.         Behavior: Behavior normal.         Thought Content: Thought content normal.         Judgment: Judgment normal.     Patient's shoes and socks removed.    Right Foot/Ankle   Right Foot Inspection  Skin Exam: skin normal and skin intact. No dry skin, no warmth, no callus, no erythema, no maceration, no abnormal color, no pre-ulcer, no ulcer and no callus.     Toe Exam: ROM and strength within normal limits. No swelling, no tenderness, erythema and  no right toe deformity    Sensory   Vibration: intact  Proprioception: intact  Monofilament testing: intact    Vascular  Capillary refills: < 3 seconds  The right DP pulse is 2+. The right PT pulse is 2+.     Left Foot/Ankle  Left Foot Inspection  Skin Exam: skin normal and skin intact. No dry skin, no warmth, no erythema, no maceration, normal color, no pre-ulcer, no ulcer and no callus.     Toe Exam: No swelling, no tenderness, no erythema and no left toe deformity.     Sensory   Vibration: intact  Proprioception: intact  Monofilament testing: intact    Vascular  Capillary refills: < 3 seconds  The left DP pulse is 2+. The left PT pulse is 2+.     Assign Risk Category  No deformity present  No loss of protective sensation  No weak  pulses  Risk: 0

## 2024-12-02 NOTE — ASSESSMENT & PLAN NOTE
Currently asymptomatic.  Will continue to monitor.  Should it begin to bother the patient we will refer to general surgery.

## 2024-12-11 ENCOUNTER — OFFICE VISIT (OUTPATIENT)
Dept: PAIN MEDICINE | Facility: CLINIC | Age: 74
End: 2024-12-11
Payer: COMMERCIAL

## 2024-12-11 VITALS — BODY MASS INDEX: 29.71 KG/M2 | HEIGHT: 64 IN | WEIGHT: 174 LBS

## 2024-12-11 DIAGNOSIS — M54.16 LUMBAR RADICULOPATHY: Primary | ICD-10-CM

## 2024-12-11 DIAGNOSIS — M47.816 LUMBAR SPONDYLOSIS: ICD-10-CM

## 2024-12-11 PROCEDURE — 99214 OFFICE O/P EST MOD 30 MIN: CPT | Performed by: ANESTHESIOLOGY

## 2024-12-11 PROCEDURE — G2211 COMPLEX E/M VISIT ADD ON: HCPCS | Performed by: ANESTHESIOLOGY

## 2024-12-11 NOTE — PROGRESS NOTES
Assessment:  1. Lumbar radiculopathy    2. Lumbar spondylosis      Patient presenting for follow up visit. He has a history of chronic back and bilateral right greater than left radiating leg pain for greater than 7 years, worsening over the past several years.     Pain is consistent with lumbar radicular pain accompanied by pain 5+/10 on the pain scale with inability to participate in IADLs for >6 weeks. Patient has participated with physical therapy as well as home exercises and stretches.  Patient has tried ibuprofen with modest benefit.  Lidocaine patches provide no benefit  Denies any bowel or bladder incontinence, saddle anesthesia.      Independently reviewed and interpreted lumbar MRI recently obtained - this showed multilevel degenerative changes with a right foraminal and extra foraminal disc protrusion at L2-3. Mild multilevel canal stenosis with moderate left foraminal narrowing at L4-5.    Overall he had >75% improvement in pain symptoms with improved function after L2-3 LESI lasting 15 days. Overall he continues to have 50% relief and improved function but still with significant pains.    Plan:     Discussed and offered repeat HEATHER at different level for hopefully additional relief- offered L4-5 LESI based on symptom distribution with radicular pain, MRI findings and failure to improve with extensive conservative treatment options.     The procedures, its risks, and benefits were explained in detail to the patient. Risks include but are not limited to bleeding, infection, hematoma formation, abscess formation, weakness, headache, failure the pain to improve, nerve irritation or damage, and potential worsening of the pain.  The approach was demonstrated using models and literature was provided. The patient verbalized understanding and wished to proceed with the procedure.      F/u 1 month after repeat LESI.     Reviewed hemoglobin A1c, renal function, CBC and/or PT/INR prior to discussing/offering  interventional modalities.     My impressions and treatment recommendations were discussed in detail with the patient who verbalized understanding and had no further questions.  Discharge instructions were provided. I personally saw and examined the patient and I agree with the above discussed plan of care.    Orders Placed This Encounter   Procedures    FL spine and pain procedure     Standing Status:   Future     Expected Date:   12/11/2024     Expiration Date:   12/11/2028     Reason for Exam::   L4-5 LESI     Anticoagulant hold needed?:   no     No orders of the defined types were placed in this encounter.      History of Present Illness:  Andriy Breen is a 74 y.o. male who presents for a follow up office visit in regards to Back Pain.   The patient’s current symptoms include continued back pain that radiates to the hips/buttocks. Pain is rated 5/10 currently and described as a constant dull/aching pain with pins and needles.    I have personally reviewed and/or updated the patient's past medical history, past surgical history, family history, social history, current medications, allergies, and vital signs today.     Review of Systems   Constitutional:  Negative for chills and fever.   HENT:  Negative for ear pain and sore throat.    Eyes:  Negative for pain and visual disturbance.   Respiratory:  Negative for cough and shortness of breath.    Cardiovascular:  Negative for chest pain and palpitations.   Gastrointestinal:  Negative for abdominal pain and vomiting.   Genitourinary:  Negative for dysuria and hematuria.   Musculoskeletal:  Positive for arthralgias, back pain, gait problem and myalgias.   Skin:  Negative for color change and rash.   Neurological:  Positive for weakness. Negative for seizures and syncope.   All other systems reviewed and are negative.      Patient Active Problem List   Diagnosis    Type 2 diabetes mellitus with diabetic mononeuropathy, without long-term current use of insulin (HCC)     Overactive bladder    Osteoarthritis of lumbar spine    Hyperlipidemia    Grade II internal hemorrhoids    Type 2 diabetes mellitus without complication, without long-term current use of insulin (HCC)    Dilated cardiomyopathy (HCC)    Chronic GERD    BPH with obstruction/lower urinary tract symptoms    Bilateral carpal tunnel syndrome    Benign essential hypertension    Erectile dysfunction    Screening for diabetic retinopathy    Tinea pedis of right foot    Primary osteoarthritis of left shoulder    Sensorineural hearing loss (SNHL) of left ear with unrestricted hearing of right ear    Medicare annual wellness visit, subsequent    Subacromial bursitis of left shoulder joint    Rotator cuff tendonitis, left    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HCC)    Nephrolithiasis    Onychomycosis of multiple toenails with type 2 diabetes mellitus  (HCC)    Chronic bilateral low back pain with bilateral sciatica    Benign essential tremor    Lumbar radiculopathy    Lipoma of neck    Constipation       Past Medical History:   Diagnosis Date    Abnormal echocardiogram 09/15/2017    Abnormal finding on MRI of brain 02/26/2020    Arthritis     OSTEOARTHRITIS OF LUMBAR SPINE    Brain tumor (HCC) 02/26/2020    Carpal tunnel syndrome     COVID     COVID-19 11/29/2022    Diabetes mellitus (HCC)     Elevated prostate specific antigen (PSA)     Erectile dysfunction     GERD (gastroesophageal reflux disease)     H. pylori infection 07/17/2018    History of BPH     Hx of adenomatous colonic polyps     Hyperlipidemia     Hypertension     Hypertension        Past Surgical History:   Procedure Laterality Date    APPENDECTOMY      CARPAL TUNNEL RELEASE      COLONOSCOPY      CYSTOSCOPY W/ DILATION OF BLADDER  01/09/2017    DR. KILO LENZ, [facility]     EGD AND COLONOSCOPY N/A 2/8/2018    Procedure: EGD with biopsy AND COLONOSCOPY with polypectomy with hemo clip application;  Surgeon: Santana Paiz MD;  Location: [facility];   Service: Gastroenterology    NEUROPLASTY / TRANSPOSITION MEDIAN NERVE AT CARPAL TUNNEL      LEFT & RIGHT       Family History   Problem Relation Age of Onset    Heart disease Mother     Hypertension Mother     Ulcers Father     Stomach cancer Father     Diabetes Family     Hypertension Family     RAMY disease Family     Arthritis Family        Social History     Occupational History    Not on file   Tobacco Use    Smoking status: Never    Smokeless tobacco: Never   Vaping Use    Vaping status: Never Used   Substance and Sexual Activity    Alcohol use: Yes     Comment: occasional beer - rare    Drug use: No    Sexual activity: Yes       Current Outpatient Medications on File Prior to Visit   Medication Sig    amLODIPine (NORVASC) 10 mg tablet Take 1 tablet (10 mg total) by mouth daily    candesartan (ATACAND) 32 MG tablet Take 1 tablet (32 mg total) by mouth daily    finasteride (PROSCAR) 5 mg tablet Take 1 tablet (5 mg total) by mouth daily    gabapentin (NEURONTIN) 300 mg capsule Take 1 capsule (300 mg total) by mouth daily at bedtime    ibuprofen (MOTRIN) 600 mg tablet Take 1 tablet (600 mg total) by mouth every 8 (eight) hours as needed for mild pain    linaGLIPtin 5 MG TABS Take 5 mg by mouth daily    metFORMIN (GLUCOPHAGE) 1000 MG tablet Take 1 tablet (1,000 mg total) by mouth 2 (two) times a day with meals    omeprazole (PriLOSEC) 40 MG capsule Take 1 capsule (40 mg total) by mouth daily    polyethylene glycol (MIRALAX) 17 g packet Take 17 g by mouth daily    rosuvastatin (CRESTOR) 20 MG tablet Take 1 tablet (20 mg total) by mouth daily    sildenafil (VIAGRA) 100 mg tablet Take 1 tablet (100 mg total) by mouth daily as needed for erectile dysfunction    tamsulosin (FLOMAX) 0.4 mg TOME 1 CAPSULA POR VIA ORAL TODOS LOS ESCOBAR WITH DINNER    trospium chloride (SANCTURA) 20 mg tablet TOME 1 TABLETA POR VIA ORAL DOS VECES AL LUIS ALBERTO     No current facility-administered medications on file prior to visit.       No Known  "Allergies    Physical Exam:    Ht 5' 3.7\" (1.618 m)   Wt 78.9 kg (174 lb)   BMI 30.15 kg/m²     Constitutional:normal, well developed, well nourished, alert, in no distress and non-toxic and no overt pain behavior.  Eyes:anicteric  HEENT:grossly intact  Neck:supple, symmetric, trachea midline and no masses   Pulmonary:even and unlabored  Cardiovascular:No edema or pitting edema present  Skin:Normal without rashes or lesions and well hydrated  Psychiatric:Mood and affect appropriate  Neurologic: Motor function is grossly intact with no focal neurologic deficits   Musculoskeletal: Limited lumbar spine range of motion.    Imaging    "

## 2024-12-16 DIAGNOSIS — I10 ESSENTIAL HYPERTENSION: ICD-10-CM

## 2024-12-17 RX ORDER — CANDESARTAN 32 MG/1
32 TABLET ORAL DAILY
Qty: 90 TABLET | Refills: 1 | Status: SHIPPED | OUTPATIENT
Start: 2024-12-17

## 2024-12-18 DIAGNOSIS — K21.9 CHRONIC GERD: ICD-10-CM

## 2024-12-19 RX ORDER — OMEPRAZOLE 40 MG/1
40 CAPSULE, DELAYED RELEASE ORAL DAILY
Qty: 90 CAPSULE | Refills: 1 | Status: SHIPPED | OUTPATIENT
Start: 2024-12-19

## 2025-01-01 PROBLEM — Z00.00 MEDICARE ANNUAL WELLNESS VISIT, SUBSEQUENT: Status: RESOLVED | Noted: 2019-06-17 | Resolved: 2025-01-01

## 2025-01-08 ENCOUNTER — PROCEDURE VISIT (OUTPATIENT)
Dept: UROLOGY | Facility: MEDICAL CENTER | Age: 75
End: 2025-01-08
Payer: COMMERCIAL

## 2025-01-08 VITALS
WEIGHT: 172 LBS | OXYGEN SATURATION: 99 % | BODY MASS INDEX: 29.37 KG/M2 | SYSTOLIC BLOOD PRESSURE: 148 MMHG | HEIGHT: 64 IN | DIASTOLIC BLOOD PRESSURE: 76 MMHG | HEART RATE: 100 BPM | RESPIRATION RATE: 21 BRPM

## 2025-01-08 DIAGNOSIS — N13.8 BPH WITH URINARY OBSTRUCTION: ICD-10-CM

## 2025-01-08 DIAGNOSIS — N40.1 BPH WITH URINARY OBSTRUCTION: ICD-10-CM

## 2025-01-08 DIAGNOSIS — R31.9 URINARY TRACT INFECTION WITH HEMATURIA, SITE UNSPECIFIED: ICD-10-CM

## 2025-01-08 DIAGNOSIS — R35.0 URINARY FREQUENCY: ICD-10-CM

## 2025-01-08 DIAGNOSIS — R35.0 URINARY FREQUENCY: Primary | ICD-10-CM

## 2025-01-08 DIAGNOSIS — N39.0 URINARY TRACT INFECTION WITH HEMATURIA, SITE UNSPECIFIED: ICD-10-CM

## 2025-01-08 DIAGNOSIS — E11.41 TYPE 2 DIABETES MELLITUS WITH DIABETIC MONONEUROPATHY, WITHOUT LONG-TERM CURRENT USE OF INSULIN (HCC): ICD-10-CM

## 2025-01-08 LAB
SL AMB  POCT GLUCOSE, UA: ABNORMAL
SL AMB LEUKOCYTE ESTERASE,UA: ABNORMAL
SL AMB POCT BILIRUBIN,UA: ABNORMAL
SL AMB POCT BLOOD,UA: ABNORMAL
SL AMB POCT CLARITY,UA: CLEAR
SL AMB POCT COLOR,UA: YELLOW
SL AMB POCT KETONES,UA: ABNORMAL
SL AMB POCT NITRITE,UA: POSITIVE
SL AMB POCT PH,UA: 5.5
SL AMB POCT SPECIFIC GRAVITY,UA: 1.02
SL AMB POCT URINE PROTEIN: ABNORMAL
SL AMB POCT UROBILINOGEN: 0.2

## 2025-01-08 PROCEDURE — 81003 URINALYSIS AUTO W/O SCOPE: CPT | Performed by: UROLOGY

## 2025-01-08 PROCEDURE — 87077 CULTURE AEROBIC IDENTIFY: CPT | Performed by: UROLOGY

## 2025-01-08 PROCEDURE — 87186 SC STD MICRODIL/AGAR DIL: CPT | Performed by: UROLOGY

## 2025-01-08 PROCEDURE — 52000 CYSTOURETHROSCOPY: CPT | Performed by: UROLOGY

## 2025-01-08 PROCEDURE — 99214 OFFICE O/P EST MOD 30 MIN: CPT | Performed by: UROLOGY

## 2025-01-08 PROCEDURE — 87086 URINE CULTURE/COLONY COUNT: CPT | Performed by: UROLOGY

## 2025-01-08 RX ORDER — TROSPIUM CHLORIDE 20 MG/1
TABLET, FILM COATED ORAL
Qty: 180 TABLET | Refills: 1 | Status: SHIPPED | OUTPATIENT
Start: 2025-01-08

## 2025-01-08 NOTE — PROGRESS NOTES
HISTORY:    Follow-up for   BPH and significant urgency frequency, occasional near urge incontinence.     He has been on finasteride and tamsulosin for a long time but does not think it is helping so much.     Oxybutynin helps a little bit with the frequency.  However still, gets up 4 times per night, daytime about every 1.5 to 2 hours.     No hematuria infection stones     PSAs has always been quite low, he is worried about cancer.     2 family members had prostate cancer     Cystoscopy     Date/Time  1/8/2025 9:00 AM     Performed by  Aj Li MD   Authorized by  jA Li MD         Procedure Details:  Procedure type: cystoscopy    Patient tolerance: Patient tolerated the procedure well with no immediate complications    Additional Procedure Details:      Patient presents for cystoscopy.  I have discussed the reasons for doing the exam, and the potential risks and complications.  Patient expressed understanding, and signed informed consent document.    The patient was carefully  positioned supine on the examining table.  Sterile preparation was performed on the urethra.  Xylocaine jelly was instilled and left  Indwelling for the procedure.  The 15 Georgian flexible cystoscope was passed with the following findings:      Urethra: No stricture    Prostate:  lateral lobes and medial lobe all significant enlarged obstructing                  Bladder: Moderate trabeculation, no lesions, tumor, or stones.     Residual urine: 150 mL    Patient tolerated the procedure well and was escorted from the examining table.             ASSESSMENT / PLAN:    Patient very bothered by chronic symptoms.  No help with high-dose tamsulosin.    Option discussed    Of the surgical options, I recommend TURP for his symptoms and size of prostate.  Potential complications infection bleeding recurrence of BPH, incontinence all discussed, he agrees to proceed        Review of Systems      Objective:     Physical Exam      0   Lab  "Value Date/Time    PSA 0.551 10/18/2024 0849    PSA 0.46 06/03/2023 1010    PSA 1.0 10/26/2021 0934    PSA 2.2 10/11/2019 1044    PSA 4.2 (H) 07/25/2017 0000   ]  BUN   Date Value Ref Range Status   11/26/2024 28 (H) 5 - 25 mg/dL Final   06/04/2019 27 (H) 7 - 25 mg/dL Final     Creatinine   Date Value Ref Range Status   11/26/2024 0.87 0.60 - 1.30 mg/dL Final     Comment:     Standardized to IDMS reference method     No components found for: \"CBC\"    Patient Active Problem List   Diagnosis    Type 2 diabetes mellitus with diabetic mononeuropathy, without long-term current use of insulin (HCC)    Overactive bladder    Osteoarthritis of lumbar spine    Hyperlipidemia    Grade II internal hemorrhoids    Type 2 diabetes mellitus without complication, without long-term current use of insulin (HCC)    Dilated cardiomyopathy (HCC)    Chronic GERD    BPH with obstruction/lower urinary tract symptoms    Bilateral carpal tunnel syndrome    Benign essential hypertension    Erectile dysfunction    Screening for diabetic retinopathy    Tinea pedis of right foot    Primary osteoarthritis of left shoulder    Sensorineural hearing loss (SNHL) of left ear with unrestricted hearing of right ear    Subacromial bursitis of left shoulder joint    Rotator cuff tendonitis, left    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HCC)    Nephrolithiasis    Onychomycosis of multiple toenails with type 2 diabetes mellitus  (HCC)    Chronic bilateral low back pain with bilateral sciatica    Benign essential tremor    Lumbar radiculopathy    Lipoma of neck    Constipation        Patient ID: Andriy Breen is a 74 y.o. male.      Current Outpatient Medications:     amLODIPine (NORVASC) 10 mg tablet, Take 1 tablet (10 mg total) by mouth daily, Disp: 100 tablet, Rfl: 1    candesartan (ATACAND) 32 MG tablet, TAKE 1 TABLET BY MOUTH DAILY., Disp: 90 tablet, Rfl: 1    finasteride (PROSCAR) 5 mg tablet, Take 1 tablet (5 mg total) by mouth daily, Disp: 100 tablet, Rfl: 1    gabapentin " (NEURONTIN) 300 mg capsule, Take 1 capsule (300 mg total) by mouth daily at bedtime, Disp: 30 capsule, Rfl: 2    ibuprofen (MOTRIN) 600 mg tablet, Take 1 tablet (600 mg total) by mouth every 8 (eight) hours as needed for mild pain, Disp: 90 tablet, Rfl: 1    linaGLIPtin 5 MG TABS, Take 5 mg by mouth daily, Disp: 100 tablet, Rfl: 1    metFORMIN (GLUCOPHAGE) 1000 MG tablet, Take 1 tablet (1,000 mg total) by mouth 2 (two) times a day with meals, Disp: 200 tablet, Rfl: 1    omeprazole (PriLOSEC) 40 MG capsule, TAKE 1 CAPSULE (40 MG TOTAL) BY MOUTH DAILY., Disp: 90 capsule, Rfl: 1    polyethylene glycol (MIRALAX) 17 g packet, Take 17 g by mouth daily, Disp: 30 each, Rfl: 1    rosuvastatin (CRESTOR) 20 MG tablet, Take 1 tablet (20 mg total) by mouth daily, Disp: 100 tablet, Rfl: 1    sildenafil (VIAGRA) 100 mg tablet, Take 1 tablet (100 mg total) by mouth daily as needed for erectile dysfunction, Disp: 3 tablet, Rfl: 5    tamsulosin (FLOMAX) 0.4 mg, TOME 1 CAPSULA POR VIA ORAL TODOS LOS ESCOBAR WITH DINNER, Disp: 90 capsule, Rfl: 1    trospium chloride (SANCTURA) 20 mg tablet, TOME 1 TABLETA POR VIA ORAL DOS VECES AL LUIS ALBERTO, Disp: 180 tablet, Rfl: 0

## 2025-01-09 DIAGNOSIS — E11.41 TYPE 2 DIABETES MELLITUS WITH DIABETIC MONONEUROPATHY, WITHOUT LONG-TERM CURRENT USE OF INSULIN (HCC): ICD-10-CM

## 2025-01-10 LAB — BACTERIA UR CULT: ABNORMAL

## 2025-01-16 DIAGNOSIS — N40.1 BPH WITH OBSTRUCTION/LOWER URINARY TRACT SYMPTOMS: ICD-10-CM

## 2025-01-16 DIAGNOSIS — N13.8 BPH WITH OBSTRUCTION/LOWER URINARY TRACT SYMPTOMS: ICD-10-CM

## 2025-01-16 RX ORDER — FINASTERIDE 5 MG/1
5 TABLET, FILM COATED ORAL DAILY
Qty: 100 TABLET | Refills: 0 | Status: SHIPPED | OUTPATIENT
Start: 2025-01-16

## 2025-01-17 ENCOUNTER — TELEPHONE (OUTPATIENT)
Dept: UROLOGY | Facility: MEDICAL CENTER | Age: 75
End: 2025-01-17

## 2025-01-20 ENCOUNTER — PREP FOR PROCEDURE (OUTPATIENT)
Dept: UROLOGY | Facility: MEDICAL CENTER | Age: 75
End: 2025-01-20

## 2025-01-20 DIAGNOSIS — Z01.810 PRE-OPERATIVE CARDIOVASCULAR EXAMINATION: ICD-10-CM

## 2025-01-20 DIAGNOSIS — R39.89 SUSPECTED UTI: Primary | ICD-10-CM

## 2025-01-20 DIAGNOSIS — N30.00 ACUTE CYSTITIS WITHOUT HEMATURIA: Primary | ICD-10-CM

## 2025-01-20 DIAGNOSIS — N13.8 BPH WITH URINARY OBSTRUCTION: ICD-10-CM

## 2025-01-20 DIAGNOSIS — Z01.812 PRE-OPERATIVE LABORATORY EXAMINATION: ICD-10-CM

## 2025-01-20 DIAGNOSIS — N40.1 BPH WITH URINARY OBSTRUCTION: ICD-10-CM

## 2025-01-20 RX ORDER — CEFUROXIME AXETIL 250 MG/1
250 TABLET ORAL EVERY 12 HOURS SCHEDULED
Qty: 14 TABLET | Refills: 0 | Status: SHIPPED | OUTPATIENT
Start: 2025-01-20 | End: 2025-01-27

## 2025-01-20 NOTE — TELEPHONE ENCOUNTER
Called out VIA [place] line - patient did not answer. Will try again in 7 days.     HOLDING 2/25 at [place] with Dr. Li

## 2025-01-22 NOTE — TELEPHONE ENCOUNTER
Patient called for SS. I asked him if 2/25 was okay for surgery at the [facility] and he said yes. I told him SS would f/u with him on what to do next.

## 2025-01-28 ENCOUNTER — RESULTS FOLLOW-UP (OUTPATIENT)
Dept: UROLOGY | Facility: MEDICAL CENTER | Age: 75
End: 2025-01-28

## 2025-01-28 NOTE — TELEPHONE ENCOUNTER
Call placed. Patient informed of results and aware of Antibiotics prescribed. Pt picked up antibiotics.----- Message from Cait WRAY sent at 1/28/2025 10:54 AM EST -----  Romanian speaking

## 2025-01-29 ENCOUNTER — PATIENT MESSAGE (OUTPATIENT)
Dept: NEUROSURGERY | Facility: CLINIC | Age: 75
End: 2025-01-29

## 2025-01-29 DIAGNOSIS — D43.2 SUBEPENDYMOMA (HCC): Primary | ICD-10-CM

## 2025-01-29 DIAGNOSIS — Z01.818 PRE-PROCEDURAL EXAMINATION: ICD-10-CM

## 2025-01-30 ENCOUNTER — PATIENT MESSAGE (OUTPATIENT)
Dept: UROLOGY | Facility: MEDICAL CENTER | Age: 75
End: 2025-01-30

## 2025-02-07 DIAGNOSIS — E78.00 HYPERCHOLESTEROLEMIA: ICD-10-CM

## 2025-02-07 RX ORDER — ROSUVASTATIN CALCIUM 20 MG/1
TABLET, COATED ORAL
Qty: 90 TABLET | Refills: 1 | Status: SHIPPED | OUTPATIENT
Start: 2025-02-07

## 2025-02-10 ENCOUNTER — HOSPITAL ENCOUNTER (OUTPATIENT)
Dept: RADIOLOGY | Facility: MEDICAL CENTER | Age: 75
Discharge: HOME | End: 2025-02-10
Payer: COMMERCIAL

## 2025-02-10 VITALS
OXYGEN SATURATION: 98 % | RESPIRATION RATE: 18 BRPM | TEMPERATURE: 97.6 F | DIASTOLIC BLOOD PRESSURE: 75 MMHG | SYSTOLIC BLOOD PRESSURE: 139 MMHG | HEART RATE: 79 BPM

## 2025-02-10 DIAGNOSIS — M54.16 LUMBAR RADICULOPATHY: ICD-10-CM

## 2025-02-10 PROCEDURE — 62323 NJX INTERLAMINAR LMBR/SAC: CPT | Performed by: ANESTHESIOLOGY

## 2025-02-10 RX ORDER — BUPIVACAINE HCL/PF 2.5 MG/ML
1 VIAL (ML) INJECTION ONCE
Status: COMPLETED | OUTPATIENT
Start: 2025-02-10 | End: 2025-02-10

## 2025-02-10 RX ORDER — METHYLPREDNISOLONE ACETATE 80 MG/ML
80 INJECTION, SUSPENSION INTRA-ARTICULAR; INTRALESIONAL; INTRAMUSCULAR; PARENTERAL; SOFT TISSUE ONCE
Status: COMPLETED | OUTPATIENT
Start: 2025-02-10 | End: 2025-02-10

## 2025-02-10 RX ADMIN — IOHEXOL 1 ML: 300 INJECTION, SOLUTION INTRAVENOUS at 10:59

## 2025-02-10 RX ADMIN — BUPIVACAINE HYDROCHLORIDE 1 ML: 2.5 INJECTION, SOLUTION EPIDURAL; INFILTRATION; INTRACAUDAL at 11:00

## 2025-02-10 RX ADMIN — METHYLPREDNISOLONE ACETATE 80 MG: 80 INJECTION, SUSPENSION INTRA-ARTICULAR; INTRALESIONAL; INTRAMUSCULAR; SOFT TISSUE at 11:00

## 2025-02-10 NOTE — DISCHARGE INSTR - LAB
INSTRUCCIONES PARA EL CRYSTAL DE CATHY INYECCIÓN EPIDURAL DE ESTEROIDES    ACTIVIDAD   No conduzca ni opere maquinarias por hoy.   No realice actividades extenuantes por hoy, sandee agacharse, levantar objetos, etc.   Puede retomar svitlana actividades normales desde mañana. Comience progresivamente y en la medida que lo tolere.   Puede ducharse, camilla no se bañe en tinas ni en jacuzzis por hoy.   Puede sentir entumecimiento yoselin varias horas debido a la anestesia local. Sea precavido y use el sentido común, en especial con las actividades que implican la carga de peso.    CUIDADO DEL ÁREA DE APLICACIÓN DE LA INYECCIÓN   Si siente molestias o dolor, aplique hielo en el área por hilda (colóquelo yoselin 20 minutos y retírelo yoselin otros 20 minutos).   A partir de mañana, podrá aplicar calor húmedo o hielo, si lo necesita.   Puede experimentar un aumento o cambio en el malestar yoselin las 36-48 horas posteriores al tratamiento. Aplique hielo y continúe tomando cualquier analgésico que le hayan recetado.   Informe a [facility] si se presenta alguno de estos síntomas: enrojecimiento, secreción, inflamación, dolor de ildefonso, rigidez de jody o fiebre superior a 100 °F.    INSTRUCCIONES ESPECIALES  Se pondrán en contacto de nuestro consultorio con usted en aproximadamente 14 días para pedir un informe de progreso.    MEDICAMENTOS   Continúe tomando todos svitlana medicamentos de rutina.   Es posible que en nuestro consultorio le hayan indicado que deje de jordana algunos medicamentos.   Puede volver a tomarlos el:              Si tiene algún problema relacionado específicamente con el procedimiento, llame a nuestro consultorio al [phone]. Si tiene problemas que no están relacionados con romeo procedimiento, debe comunicarse con romeo médico de cabecera.

## 2025-02-10 NOTE — H&P
History of Present Illness: The patient is a 74 y.o. male who presents with complaints of back pain    Past Medical History:   Diagnosis Date    Abnormal echocardiogram 09/15/2017    Abnormal finding on MRI of brain 02/26/2020    Arthritis     OSTEOARTHRITIS OF LUMBAR SPINE    Brain tumor (HCC) 02/26/2020    Carpal tunnel syndrome     COVID     COVID-19 11/29/2022    Diabetes mellitus (HCC)     Elevated prostate specific antigen (PSA)     Erectile dysfunction     GERD (gastroesophageal reflux disease)     H. pylori infection 07/17/2018    History of BPH     Hx of adenomatous colonic polyps     Hyperlipidemia     Hypertension     Hypertension        Past Surgical History:   Procedure Laterality Date    APPENDECTOMY      CARPAL TUNNEL RELEASE      COLONOSCOPY      CYSTOSCOPY W/ DILATION OF BLADDER  01/09/2017    DR. KILO LENZ, [facility]     EGD AND COLONOSCOPY N/A 2/8/2018    Procedure: EGD with biopsy AND COLONOSCOPY with polypectomy with hemo clip application;  Surgeon: Santana Paiz MD;  Location: [facility];  Service: Gastroenterology    NEUROPLASTY / TRANSPOSITION MEDIAN NERVE AT CARPAL TUNNEL      LEFT & RIGHT         Current Outpatient Medications:     amLODIPine (NORVASC) 10 mg tablet, Take 1 tablet (10 mg total) by mouth daily, Disp: 100 tablet, Rfl: 1    candesartan (ATACAND) 32 MG tablet, TAKE 1 TABLET BY MOUTH DAILY., Disp: 90 tablet, Rfl: 1    finasteride (PROSCAR) 5 mg tablet, Take 1 tablet (5 mg total) by mouth daily, Disp: 100 tablet, Rfl: 0    gabapentin (NEURONTIN) 300 mg capsule, Take 1 capsule (300 mg total) by mouth daily at bedtime, Disp: 30 capsule, Rfl: 2    ibuprofen (MOTRIN) 600 mg tablet, Take 1 tablet (600 mg total) by mouth every 8 (eight) hours as needed for mild pain, Disp: 90 tablet, Rfl: 1    linaGLIPtin 5 MG TABS, Take 5 mg by mouth daily, Disp: 100 tablet, Rfl: 1    metFORMIN (GLUCOPHAGE) 1000 MG tablet, Take 1 tablet (1,000 mg total) by mouth 2 (two)  times a day with meals, Disp: 200 tablet, Rfl: 1    omeprazole (PriLOSEC) 40 MG capsule, TAKE 1 CAPSULE (40 MG TOTAL) BY MOUTH DAILY., Disp: 90 capsule, Rfl: 1    polyethylene glycol (MIRALAX) 17 g packet, Take 17 g by mouth daily, Disp: 30 each, Rfl: 1    rosuvastatin (CRESTOR) 20 MG tablet, TOME 1 TABLETA POR VIA ORAL TODOS LOS ESCOBAR, Disp: 90 tablet, Rfl: 1    sildenafil (VIAGRA) 100 mg tablet, Take 1 tablet (100 mg total) by mouth daily as needed for erectile dysfunction, Disp: 3 tablet, Rfl: 5    tamsulosin (FLOMAX) 0.4 mg, TOME 1 CAPSULA POR VIA ORAL TODOS LOS ESCOBAR WITH DINNER, Disp: 90 capsule, Rfl: 1    trospium chloride (SANCTURA) 20 mg tablet, TOME 1 TABLETA POR VIA ORAL DOS VECES AL LUIS ALBERTO, Disp: 180 tablet, Rfl: 1    Current Facility-Administered Medications:     bupivacaine (PF) (MARCAINE) 0.25 % injection 1 mL, 1 mL, Epidural, Once, Will Coni Benavidez MD    iohexol (OMNIPAQUE) 300 mg/mL injection 1 mL, 1 mL, Epidural, Once, Will Coni Benavidez MD    methylPREDNISolone acetate (DEPO-MEDROL) injection 80 mg, 80 mg, Epidural, Once, Will Coni Benavidez MD    No Known Allergies    Physical Exam:   Vitals:    02/10/25 1045   BP: 148/76   Pulse: 84   Resp: 18   Temp: 97.6 °F (36.4 °C)   SpO2: 97%     General: Awake, Alert, Oriented x 3, Mood and affect appropriate  Respiratory: Respirations even and unlabored  Cardiovascular: Peripheral pulses intact; no edema  Musculoskeletal Exam: back pain    ASA Score: 2    Patient/Chart Verification  Patient ID Verified: Verbal  ID Band Applied: No  Consents Confirmed: To be obtained in the Procedural area  Interval H&P(within 24 hr) Complete (required for Outpatients and Surgery Admit only): To be obtained in the Procedural area  Allergies Reviewed: Yes  Anticoag/NSAID held?: NA  Currently on antibiotics?: No    Assessment:   1. Lumbar radiculopathy        Plan: L4-5 LESI

## 2025-02-14 ENCOUNTER — APPOINTMENT (OUTPATIENT)
Dept: LAB | Facility: HOSPITAL | Age: 75
End: 2025-02-14
Payer: COMMERCIAL

## 2025-02-14 DIAGNOSIS — Z01.810 PRE-OPERATIVE CARDIOVASCULAR EXAMINATION: ICD-10-CM

## 2025-02-14 DIAGNOSIS — Z01.812 PRE-OPERATIVE LABORATORY EXAMINATION: ICD-10-CM

## 2025-02-14 DIAGNOSIS — R39.89 SUSPECTED UTI: ICD-10-CM

## 2025-02-14 DIAGNOSIS — N13.8 BPH WITH URINARY OBSTRUCTION: ICD-10-CM

## 2025-02-14 DIAGNOSIS — N40.1 BPH WITH URINARY OBSTRUCTION: ICD-10-CM

## 2025-02-14 LAB
ABO GROUP BLD: NORMAL
ANION GAP SERPL CALCULATED.3IONS-SCNC: 6 MMOL/L (ref 4–13)
ATRIAL RATE: 75 BPM
BASOPHILS # BLD AUTO: 0.06 THOUSANDS/ÂΜL (ref 0–0.1)
BASOPHILS NFR BLD AUTO: 1 % (ref 0–1)
BLD GP AB SCN SERPL QL: NEGATIVE
BUN SERPL-MCNC: 21 MG/DL (ref 5–25)
CALCIUM SERPL-MCNC: 9.7 MG/DL (ref 8.4–10.2)
CHLORIDE SERPL-SCNC: 102 MMOL/L (ref 96–108)
CO2 SERPL-SCNC: 28 MMOL/L (ref 21–32)
CREAT SERPL-MCNC: 0.88 MG/DL (ref 0.6–1.3)
EOSINOPHIL # BLD AUTO: 0.06 THOUSAND/ÂΜL (ref 0–0.61)
EOSINOPHIL NFR BLD AUTO: 1 % (ref 0–6)
ERYTHROCYTE [DISTWIDTH] IN BLOOD BY AUTOMATED COUNT: 14.1 % (ref 11.6–15.1)
GFR SERPL CREATININE-BSD FRML MDRD: 84 ML/MIN/1.73SQ M
GLUCOSE P FAST SERPL-MCNC: 128 MG/DL (ref 65–99)
HCT VFR BLD AUTO: 44.1 % (ref 36.5–49.3)
HGB BLD-MCNC: 14.4 G/DL (ref 12–17)
IMM GRANULOCYTES # BLD AUTO: 0.08 THOUSAND/UL (ref 0–0.2)
IMM GRANULOCYTES NFR BLD AUTO: 1 % (ref 0–2)
LYMPHOCYTES # BLD AUTO: 5.04 THOUSANDS/ÂΜL (ref 0.6–4.47)
LYMPHOCYTES NFR BLD AUTO: 44 % (ref 14–44)
MCH RBC QN AUTO: 28.9 PG (ref 26.8–34.3)
MCHC RBC AUTO-ENTMCNC: 32.7 G/DL (ref 31.4–37.4)
MCV RBC AUTO: 88 FL (ref 82–98)
MONOCYTES # BLD AUTO: 0.94 THOUSAND/ÂΜL (ref 0.17–1.22)
MONOCYTES NFR BLD AUTO: 8 % (ref 4–12)
NEUTROPHILS # BLD AUTO: 5.38 THOUSANDS/ÂΜL (ref 1.85–7.62)
NEUTS SEG NFR BLD AUTO: 45 % (ref 43–75)
NRBC BLD AUTO-RTO: 0 /100 WBCS
P AXIS: 22 DEGREES
PLATELET # BLD AUTO: 256 THOUSANDS/UL (ref 149–390)
PMV BLD AUTO: 9.2 FL (ref 8.9–12.7)
POTASSIUM SERPL-SCNC: 4.2 MMOL/L (ref 3.5–5.3)
PR INTERVAL: 156 MS
QRS AXIS: -15 DEGREES
QRSD INTERVAL: 118 MS
QT INTERVAL: 376 MS
QTC INTERVAL: 420 MS
RBC # BLD AUTO: 4.99 MILLION/UL (ref 3.88–5.62)
RH BLD: POSITIVE
SODIUM SERPL-SCNC: 136 MMOL/L (ref 135–147)
SPECIMEN EXPIRATION DATE: NORMAL
T WAVE AXIS: 15 DEGREES
VENTRICULAR RATE: 75 BPM
WBC # BLD AUTO: 11.56 THOUSAND/UL (ref 4.31–10.16)

## 2025-02-14 PROCEDURE — 93010 ELECTROCARDIOGRAM REPORT: CPT | Performed by: INTERNAL MEDICINE

## 2025-02-14 PROCEDURE — 86900 BLOOD TYPING SEROLOGIC ABO: CPT

## 2025-02-14 PROCEDURE — 87077 CULTURE AEROBIC IDENTIFY: CPT

## 2025-02-14 PROCEDURE — 87186 SC STD MICRODIL/AGAR DIL: CPT

## 2025-02-14 PROCEDURE — 86901 BLOOD TYPING SEROLOGIC RH(D): CPT

## 2025-02-14 PROCEDURE — 85025 COMPLETE CBC W/AUTO DIFF WBC: CPT

## 2025-02-14 PROCEDURE — 36415 COLL VENOUS BLD VENIPUNCTURE: CPT

## 2025-02-14 PROCEDURE — 87086 URINE CULTURE/COLONY COUNT: CPT

## 2025-02-14 PROCEDURE — 80048 BASIC METABOLIC PNL TOTAL CA: CPT

## 2025-02-14 PROCEDURE — 86850 RBC ANTIBODY SCREEN: CPT

## 2025-02-16 LAB — BACTERIA UR CULT: ABNORMAL

## 2025-02-18 ENCOUNTER — TELEPHONE (OUTPATIENT)
Dept: UROLOGY | Facility: MEDICAL CENTER | Age: 75
End: 2025-02-18

## 2025-02-18 ENCOUNTER — ANESTHESIA EVENT (OUTPATIENT)
Dept: PERIOP | Facility: HOSPITAL | Age: 75
End: 2025-02-18
Payer: COMMERCIAL

## 2025-02-18 DIAGNOSIS — N30.00 ACUTE CYSTITIS WITHOUT HEMATURIA: Primary | ICD-10-CM

## 2025-02-18 DIAGNOSIS — Z91.89 AT RISK FOR SLEEP APNEA: Primary | ICD-10-CM

## 2025-02-18 RX ORDER — LEVOFLOXACIN 750 MG/1
750 TABLET, FILM COATED ORAL EVERY 24 HOURS
Qty: 10 TABLET | Refills: 0 | Status: SHIPPED | OUTPATIENT
Start: 2025-02-18 | End: 2025-02-28

## 2025-02-18 RX ORDER — DIPHENOXYLATE HYDROCHLORIDE AND ATROPINE SULFATE 2.5; .025 MG/1; MG/1
1 TABLET ORAL DAILY
COMMUNITY
End: 2025-02-27 | Stop reason: ALTCHOICE

## 2025-02-18 NOTE — TELEPHONE ENCOUNTER
Spoke to pt through  #[phone] and gave the following message    ----- Message from Aj Li MD sent at 2/18/2025  3:44 PM EST -----  Tell patient I sent Levaquin once per day for 10 days starting when he gets it soon.

## 2025-02-18 NOTE — PRE-PROCEDURE INSTRUCTIONS
Pre-Surgery Instructions:   Medication Instructions    amLODIPine (NORVASC) 10 mg tablet Take day of surgery. With sip of water     candesartan (ATACAND) 32 MG tablet Hold day of surgery.    finasteride (PROSCAR) 5 mg tablet Take day of surgery. With sip of water     linaGLIPtin 5 MG TABS Hold day of surgery.    metFORMIN (GLUCOPHAGE) 1000 MG tablet Hold day of surgery.    multivitamin (THERAGRAN) TABS Stop taking 7 days prior to surgery.    omeprazole (PriLOSEC) 40 MG capsule Take day of surgery. With sip of water     rosuvastatin (CRESTOR) 20 MG tablet Take day of surgery. With sip of water     sildenafil (VIAGRA) 100 mg tablet Per anesthesia instructions - pt instructed not to use within 24 hrs prior to surgery     tamsulosin (FLOMAX) 0.4 mg Take night before surgery with dinner     trospium chloride (SANCTURA) 20 mg tablet Hold day of surgery.    Used [employer] for interview - pt was not good historian with medication review,  slow with interpretation with interview.  Pt instructed on use of cleanser for DOS and instructions for showering both night before and DOS reviewed with pt - pt verbalized understanding of instructions given  Pt also instructed on no hair or body products on skin for DOS.  No shaving with a razor blade for 7 days prior to surgery to decrease any incident of infection - electric razor is ok to use up till 24 hrs prior to DOS.  Pt instructed that if with any changes in health status between now and DOS - notify surgeon office.  Tylenol is ok to use as needed up to and including DOS with sips of water - if needed - did instruct NO NSAIDS to be used at least 3 days prior to surgery - or if given a longer hold time of NSAIDS from MD please follow MD hold instructions.  Instructed to bring photo ID and medical insurance card for DOS as forms of identification for DOS.  Also instructed pt on no jewelry or body piercings, no valuables on body for DOS. Contact lenses, if worn - need to be  removed for DOS.  Pt instructed does need transport home after surgery- pt is not allowed to drive.    Medication instructions for day of surgery reviewed. Please take all instructed medications with only a sip of water.       You will receive a call one business day prior to surgery with an arrival time and hospital directions. If your surgery is scheduled on a Monday, the hospital will be calling you on the Friday prior to your surgery. If you have not heard from anyone by 8pm, please call the hospital supervisor through the hospital  at [phone]. ([place] [phone] or [place] [phone]).    Do not eat or drink anything after midnight the night before your surgery, including candy, mints, lifesavers, or chewing gum. Do not drink alcohol 24hrs before your surgery. Try not to smoke at least 24hrs before your surgery.       Follow the pre surgery showering instructions as listed in the “My Surgical Experience Booklet” or otherwise provided by your surgeon's office. Do not use a blade to shave the surgical area 1 week before surgery. It is okay to use a clean electric clippers up to 24 hours before surgery. Do not apply any lotions, creams, including makeup, cologne, deodorant, or perfumes after showering on the day of your surgery. Do not use dry shampoo, hair spray, hair gel, or any type of hair products.     No contact lenses, eye make-up, or artificial eyelashes. Remove nail polish, including gel polish, and any artificial, gel, or acrylic nails if possible. Remove all jewelry including rings and body piercing jewelry.     Wear causal clothing that is easy to take on and off. Consider your type of surgery.    Keep any valuables, jewelry, piercings at home. Please bring any specially ordered equipment (sling, braces) if indicated.    Arrange for a responsible person to drive you to and from the hospital on the day of your surgery. Please confirm the visitor policy for the day of your procedure  when you receive your phone call with an arrival time.     Call the surgeon's office with any new illnesses, exposures, or additional questions prior to surgery.    Please reference your “My Surgical Experience Booklet” for additional information to prepare for your upcoming surgery.

## 2025-02-24 ENCOUNTER — TELEPHONE (OUTPATIENT)
Dept: PAIN MEDICINE | Facility: CLINIC | Age: 75
End: 2025-02-24

## 2025-02-25 ENCOUNTER — ANESTHESIA (OUTPATIENT)
Dept: PERIOP | Facility: HOSPITAL | Age: 75
End: 2025-02-25
Payer: COMMERCIAL

## 2025-02-25 ENCOUNTER — HOSPITAL ENCOUNTER (OUTPATIENT)
Facility: HOSPITAL | Age: 75
Setting detail: OUTPATIENT SURGERY
Discharge: HOME | End: 2025-02-26
Attending: UROLOGY | Admitting: UROLOGY
Payer: COMMERCIAL

## 2025-02-25 DIAGNOSIS — N40.1 BPH WITH URINARY OBSTRUCTION: Primary | ICD-10-CM

## 2025-02-25 DIAGNOSIS — N30.00 ACUTE CYSTITIS WITHOUT HEMATURIA: ICD-10-CM

## 2025-02-25 DIAGNOSIS — N13.8 BPH WITH URINARY OBSTRUCTION: Primary | ICD-10-CM

## 2025-02-25 LAB
ABO GROUP BLD: NORMAL
GLUCOSE SERPL-MCNC: 130 MG/DL (ref 65–140)
GLUCOSE SERPL-MCNC: 136 MG/DL (ref 65–140)
RH BLD: POSITIVE

## 2025-02-25 PROCEDURE — 52601 PROSTATECTOMY (TURP): CPT | Performed by: UROLOGY

## 2025-02-25 PROCEDURE — 82948 REAGENT STRIP/BLOOD GLUCOSE: CPT

## 2025-02-25 PROCEDURE — 88305 TISSUE EXAM BY PATHOLOGIST: CPT | Performed by: PATHOLOGY

## 2025-02-25 RX ORDER — CEFAZOLIN SODIUM 1 G/50ML
1000 SOLUTION INTRAVENOUS EVERY 8 HOURS
Status: COMPLETED | OUTPATIENT
Start: 2025-02-26 | End: 2025-02-26

## 2025-02-25 RX ORDER — SORBITOL 30 G/1000ML
IRRIGANT IRRIGATION AS NEEDED
Status: DISCONTINUED | OUTPATIENT
Start: 2025-02-25 | End: 2025-02-25 | Stop reason: HOSPADM

## 2025-02-25 RX ORDER — LIDOCAINE HYDROCHLORIDE 20 MG/ML
INJECTION, SOLUTION EPIDURAL; INFILTRATION; INTRACAUDAL; PERINEURAL AS NEEDED
Status: DISCONTINUED | OUTPATIENT
Start: 2025-02-25 | End: 2025-02-25

## 2025-02-25 RX ORDER — CEFAZOLIN SODIUM 2 G/50ML
2000 SOLUTION INTRAVENOUS ONCE
Status: COMPLETED | OUTPATIENT
Start: 2025-02-25 | End: 2025-02-25

## 2025-02-25 RX ORDER — PROPOFOL 10 MG/ML
INJECTION, EMULSION INTRAVENOUS AS NEEDED
Status: DISCONTINUED | OUTPATIENT
Start: 2025-02-25 | End: 2025-02-25

## 2025-02-25 RX ORDER — MEPERIDINE HYDROCHLORIDE 25 MG/ML
12.5 INJECTION INTRAMUSCULAR; INTRAVENOUS; SUBCUTANEOUS
Status: DISCONTINUED | OUTPATIENT
Start: 2025-02-25 | End: 2025-02-25 | Stop reason: HOSPADM

## 2025-02-25 RX ORDER — SODIUM CHLORIDE, SODIUM LACTATE, POTASSIUM CHLORIDE, CALCIUM CHLORIDE 600; 310; 30; 20 MG/100ML; MG/100ML; MG/100ML; MG/100ML
125 INJECTION, SOLUTION INTRAVENOUS CONTINUOUS
Status: DISCONTINUED | OUTPATIENT
Start: 2025-02-25 | End: 2025-02-26 | Stop reason: HOSPADM

## 2025-02-25 RX ORDER — PANTOPRAZOLE SODIUM 20 MG/1
20 TABLET, DELAYED RELEASE ORAL
Status: DISCONTINUED | OUTPATIENT
Start: 2025-02-26 | End: 2025-02-25

## 2025-02-25 RX ORDER — OXYCODONE AND ACETAMINOPHEN 5; 325 MG/1; MG/1
2 TABLET ORAL EVERY 4 HOURS PRN
Status: DISCONTINUED | OUTPATIENT
Start: 2025-02-25 | End: 2025-02-26 | Stop reason: HOSPADM

## 2025-02-25 RX ORDER — OXYBUTYNIN CHLORIDE 5 MG/1
5 TABLET ORAL EVERY 6 HOURS PRN
Status: DISCONTINUED | OUTPATIENT
Start: 2025-02-25 | End: 2025-02-26 | Stop reason: HOSPADM

## 2025-02-25 RX ORDER — HYDROMORPHONE HCL IN WATER/PF 6 MG/30 ML
0.2 PATIENT CONTROLLED ANALGESIA SYRINGE INTRAVENOUS
Status: DISCONTINUED | OUTPATIENT
Start: 2025-02-25 | End: 2025-02-26 | Stop reason: HOSPADM

## 2025-02-25 RX ORDER — ACETAMINOPHEN 325 MG/1
650 TABLET ORAL EVERY 6 HOURS SCHEDULED
Status: DISCONTINUED | OUTPATIENT
Start: 2025-02-25 | End: 2025-02-26 | Stop reason: HOSPADM

## 2025-02-25 RX ORDER — DEXAMETHASONE SODIUM PHOSPHATE 10 MG/ML
INJECTION, SOLUTION INTRAMUSCULAR; INTRAVENOUS AS NEEDED
Status: DISCONTINUED | OUTPATIENT
Start: 2025-02-25 | End: 2025-02-25

## 2025-02-25 RX ORDER — LOSARTAN POTASSIUM 50 MG/1
100 TABLET ORAL DAILY
Status: DISCONTINUED | OUTPATIENT
Start: 2025-02-26 | End: 2025-02-26 | Stop reason: HOSPADM

## 2025-02-25 RX ORDER — ONDANSETRON 2 MG/ML
INJECTION INTRAMUSCULAR; INTRAVENOUS AS NEEDED
Status: DISCONTINUED | OUTPATIENT
Start: 2025-02-25 | End: 2025-02-25

## 2025-02-25 RX ORDER — ONDANSETRON 2 MG/ML
4 INJECTION INTRAMUSCULAR; INTRAVENOUS ONCE AS NEEDED
Status: DISCONTINUED | OUTPATIENT
Start: 2025-02-25 | End: 2025-02-25 | Stop reason: HOSPADM

## 2025-02-25 RX ORDER — FENTANYL CITRATE 50 UG/ML
INJECTION, SOLUTION INTRAMUSCULAR; INTRAVENOUS AS NEEDED
Status: DISCONTINUED | OUTPATIENT
Start: 2025-02-25 | End: 2025-02-25

## 2025-02-25 RX ORDER — AMLODIPINE BESYLATE 10 MG/1
10 TABLET ORAL DAILY
Status: DISCONTINUED | OUTPATIENT
Start: 2025-02-26 | End: 2025-02-26 | Stop reason: HOSPADM

## 2025-02-25 RX ORDER — MAGNESIUM HYDROXIDE 1200 MG/15ML
LIQUID ORAL AS NEEDED
Status: DISCONTINUED | OUTPATIENT
Start: 2025-02-25 | End: 2025-02-25 | Stop reason: HOSPADM

## 2025-02-25 RX ORDER — FENTANYL CITRATE/PF 50 MCG/ML
25 SYRINGE (ML) INJECTION
Status: DISCONTINUED | OUTPATIENT
Start: 2025-02-25 | End: 2025-02-25 | Stop reason: HOSPADM

## 2025-02-25 RX ORDER — LOSARTAN POTASSIUM 25 MG/1
12.5 TABLET ORAL DAILY
Status: DISCONTINUED | OUTPATIENT
Start: 2025-02-26 | End: 2025-02-25

## 2025-02-25 RX ORDER — OXYCODONE AND ACETAMINOPHEN 5; 325 MG/1; MG/1
1 TABLET ORAL EVERY 4 HOURS PRN
Status: DISCONTINUED | OUTPATIENT
Start: 2025-02-25 | End: 2025-02-26 | Stop reason: HOSPADM

## 2025-02-25 RX ORDER — HYDROMORPHONE HCL/PF 1 MG/ML
0.5 SYRINGE (ML) INJECTION
Status: DISCONTINUED | OUTPATIENT
Start: 2025-02-25 | End: 2025-02-25 | Stop reason: HOSPADM

## 2025-02-25 RX ORDER — ONDANSETRON 2 MG/ML
4 INJECTION INTRAMUSCULAR; INTRAVENOUS EVERY 6 HOURS PRN
Status: DISCONTINUED | OUTPATIENT
Start: 2025-02-25 | End: 2025-02-26 | Stop reason: HOSPADM

## 2025-02-25 RX ORDER — PANTOPRAZOLE SODIUM 40 MG/1
40 TABLET, DELAYED RELEASE ORAL
Status: DISCONTINUED | OUTPATIENT
Start: 2025-02-26 | End: 2025-02-26 | Stop reason: HOSPADM

## 2025-02-25 RX ADMIN — PROPOFOL 150 MG: 10 INJECTION, EMULSION INTRAVENOUS at 16:56

## 2025-02-25 RX ADMIN — ONDANSETRON 4 MG: 2 INJECTION INTRAMUSCULAR; INTRAVENOUS at 17:05

## 2025-02-25 RX ADMIN — FENTANYL CITRATE 25 MCG: 50 INJECTION INTRAMUSCULAR; INTRAVENOUS at 17:14

## 2025-02-25 RX ADMIN — OXYCODONE HYDROCHLORIDE AND ACETAMINOPHEN 1 TABLET: 5; 325 TABLET ORAL at 19:00

## 2025-02-25 RX ADMIN — LIDOCAINE HYDROCHLORIDE 100 MG: 20 INJECTION, SOLUTION EPIDURAL; INFILTRATION; INTRACAUDAL at 16:56

## 2025-02-25 RX ADMIN — FENTANYL CITRATE 25 MCG: 50 INJECTION INTRAMUSCULAR; INTRAVENOUS at 17:10

## 2025-02-25 RX ADMIN — FENTANYL CITRATE 25 MCG: 50 INJECTION INTRAMUSCULAR; INTRAVENOUS at 17:05

## 2025-02-25 RX ADMIN — CEFAZOLIN SODIUM 2000 MG: 2 SOLUTION INTRAVENOUS at 17:04

## 2025-02-25 RX ADMIN — DEXAMETHASONE SODIUM PHOSPHATE 10 MG: 10 INJECTION INTRAMUSCULAR; INTRAVENOUS at 17:05

## 2025-02-25 RX ADMIN — FENTANYL CITRATE 25 MCG: 50 INJECTION INTRAMUSCULAR; INTRAVENOUS at 17:18

## 2025-02-25 RX ADMIN — SODIUM CHLORIDE, SODIUM LACTATE, POTASSIUM CHLORIDE, AND CALCIUM CHLORIDE 125 ML/HR: .6; .31; .03; .02 INJECTION, SOLUTION INTRAVENOUS at 15:20

## 2025-02-25 RX ADMIN — SODIUM CHLORIDE, SODIUM LACTATE, POTASSIUM CHLORIDE, AND CALCIUM CHLORIDE 125 ML/HR: .6; .31; .03; .02 INJECTION, SOLUTION INTRAVENOUS at 19:02

## 2025-02-25 RX ADMIN — CEFAZOLIN SODIUM 1000 MG: 1 SOLUTION INTRAVENOUS at 23:58

## 2025-02-25 RX ADMIN — OXYBUTYNIN CHLORIDE 5 MG: 5 TABLET ORAL at 20:01

## 2025-02-25 NOTE — OP NOTE
OPERATIVE REPORT  PATIENT NAME: Andriy Breen    :  1950  MRN: [id number]  Pt Location: [facility] OR ROOM 06    SURGERY DATE: 2025    Surgeons and Role:     * Aj Li MD - Primary    Preop Diagnosis:  BPH with urinary obstruction [N40.1, N13.8]    Post-Op Diagnosis Codes:     * BPH with urinary obstruction [N40.1, N13.8]    Procedure(s):  (TURP)    Specimen(s):  ID Type Source Tests Collected by Time Destination   1 : Prostate Chips Tissue Prostate TISSUE EXAM Aj Li MD 2025  5:25 PM        Estimated Blood Loss:   100 mL    Drains:  Continuous Bladder Irrigation Three-way (Active)   Site Assessment Clean;Skin intact 25   Securement Method Tape 25   Irrigant Normal saline 25   Number of days: 0       Anesthesia Type:   General/LMA    Operative Indications:  BPH with urinary obstruction [N40.1, N13.8]      Operative Findings:  Large prostate      Complications:   None    Procedure and Technique:      The patient was brought into the OR, properly identified and positioned on the table. General anesthesia was induced, and he was prepped using chlorhexidine solution and draped in lithotomy position.     The urethra was mildly stenotic and was dilated to 30F with sounds. The 26 F resectoscope sheath was introduced, and inspection confirmed hyperplasia of the prostate and secondary obstructive changes in the bladder.     Prostate tissue was resected circumferentially from the bladder neck out to the apex, down to intermittent capsular fibers. There were no capsular perforations noted. Pieces of tissue were evacuated using the Urovac bottle type evacuator. Hemostasis was obtained using electrocautery.     Final inspection revealed no damage to the ureteral orifices, well resected prostate, no significant bleeding, no retained pieces of tissue, and no damage to the sphincter.   The scope was removed, a 22 Fcatheter was inserted, irrigated clear, and hooked up to  gravity drainage. The patient left the OR in good condition   I was present for the entire procedure.    Patient Disposition:  PACU              SIGNATURE: Aj Li MD  DATE: February 25, 2025  TIME: 5:49 PM

## 2025-02-25 NOTE — ANESTHESIA PREPROCEDURE EVALUATION
Procedure:  (TURP) (Urethra)    Relevant Problems   CARDIO   (+) Benign essential hypertension   (+) Grade II internal hemorrhoids   (+) Hyperlipidemia      ENDO   (+) Type 2 diabetes mellitus with diabetic mononeuropathy, without long-term current use of insulin (HCC)   (+) Type 2 diabetes mellitus without complication, without long-term current use of insulin (HCC)      GI/HEPATIC   (+) Chronic GERD      /RENAL   (+) BPH with obstruction/lower urinary tract symptoms   (+) Nephrolithiasis      MUSCULOSKELETAL   (+) Chronic bilateral low back pain with bilateral sciatica   (+) Osteoarthritis of lumbar spine   (+) Primary osteoarthritis of left shoulder      NEURO/PSYCH   (+) Chronic bilateral low back pain with bilateral sciatica   (+) Type 2 diabetes mellitus with diabetic mononeuropathy, without long-term current use of insulin (HCC)        Physical Exam    Airway    Mallampati score: I  TM Distance: >3 FB  Neck ROM: full     Dental       Cardiovascular  Cardiovascular exam normal    Pulmonary  Pulmonary exam normal     Other Findings        Anesthesia Plan  ASA Score- 3     Anesthesia Type- general with ASA Monitors.         Additional Monitors:     Airway Plan: LMA.           Plan Factors-Exercise tolerance (METS): >4 METS.    Chart reviewed.   Existing labs reviewed. Patient summary reviewed.    Patient is not a current smoker.      Obstructive sleep apnea risk education given perioperatively.        Induction- intravenous.    Postoperative Plan-     Perioperative Resuscitation Plan - Level 1 - Full Code.       Informed Consent- Anesthetic plan and risks discussed with patient.        NPO Status:  Vitals Value Taken Time   Date of last liquid 02/25/25 02/25/25 1500   Time of last liquid 1300 02/25/25 1500   Date of last solid 02/24/25 02/25/25 1500   Time of last solid 2000 02/25/25 1502

## 2025-02-25 NOTE — ANESTHESIA POSTPROCEDURE EVALUATION
Post-Op Assessment Note    CV Status:  Stable  Pain Score: 0    Pain management: adequate       Mental Status:  Alert and awake   Hydration Status:  Euvolemic   PONV Controlled:  Controlled   Airway Patency:  Patent and adequate  Two or more mitigation strategies used for obstructive sleep apnea   Post Op Vitals Reviewed: Yes    No anethesia notable event occurred.    Staff: CRNA           Last Filed PACU Vitals:  Vitals Value Taken Time   Temp 36.3    Pulse 69    /78 02/25/25 1758   Resp 20    SpO2 95    Vitals shown include unfiled device data.

## 2025-02-26 ENCOUNTER — TELEPHONE (OUTPATIENT)
Dept: OTHER | Facility: HOSPITAL | Age: 75
End: 2025-02-26

## 2025-02-26 VITALS
SYSTOLIC BLOOD PRESSURE: 128 MMHG | RESPIRATION RATE: 18 BRPM | BODY MASS INDEX: 29.26 KG/M2 | OXYGEN SATURATION: 97 % | DIASTOLIC BLOOD PRESSURE: 64 MMHG | TEMPERATURE: 97.9 F | HEART RATE: 61 BPM | WEIGHT: 168.87 LBS

## 2025-02-26 LAB
ANION GAP SERPL CALCULATED.3IONS-SCNC: 8 MMOL/L (ref 4–13)
BUN SERPL-MCNC: 23 MG/DL (ref 5–25)
CALCIUM SERPL-MCNC: 9.3 MG/DL (ref 8.4–10.2)
CHLORIDE SERPL-SCNC: 105 MMOL/L (ref 96–108)
CO2 SERPL-SCNC: 21 MMOL/L (ref 21–32)
CREAT SERPL-MCNC: 0.81 MG/DL (ref 0.6–1.3)
ERYTHROCYTE [DISTWIDTH] IN BLOOD BY AUTOMATED COUNT: 14.3 % (ref 11.6–15.1)
GFR SERPL CREATININE-BSD FRML MDRD: 87 ML/MIN/1.73SQ M
GLUCOSE SERPL-MCNC: 188 MG/DL (ref 65–140)
HCT VFR BLD AUTO: 40.5 % (ref 36.5–49.3)
HGB BLD-MCNC: 13.4 G/DL (ref 12–17)
MCH RBC QN AUTO: 28.8 PG (ref 26.8–34.3)
MCHC RBC AUTO-ENTMCNC: 33.1 G/DL (ref 31.4–37.4)
MCV RBC AUTO: 87 FL (ref 82–98)
PLATELET # BLD AUTO: 222 THOUSANDS/UL (ref 149–390)
PMV BLD AUTO: 9.7 FL (ref 8.9–12.7)
POTASSIUM SERPL-SCNC: 4.4 MMOL/L (ref 3.5–5.3)
RBC # BLD AUTO: 4.66 MILLION/UL (ref 3.88–5.62)
SODIUM SERPL-SCNC: 134 MMOL/L (ref 135–147)
WBC # BLD AUTO: 14.37 THOUSAND/UL (ref 4.31–10.16)

## 2025-02-26 PROCEDURE — NC001 PR NO CHARGE

## 2025-02-26 PROCEDURE — 80048 BASIC METABOLIC PNL TOTAL CA: CPT | Performed by: UROLOGY

## 2025-02-26 PROCEDURE — 99024 POSTOP FOLLOW-UP VISIT: CPT

## 2025-02-26 PROCEDURE — 85027 COMPLETE CBC AUTOMATED: CPT | Performed by: UROLOGY

## 2025-02-26 RX ORDER — SENNOSIDES 8.6 MG
650 CAPSULE ORAL EVERY 8 HOURS PRN
Qty: 30 TABLET | Refills: 0 | Status: SHIPPED | OUTPATIENT
Start: 2025-02-26

## 2025-02-26 RX ORDER — DOCUSATE SODIUM 100 MG/1
100 CAPSULE, LIQUID FILLED ORAL 2 TIMES DAILY
Qty: 14 CAPSULE | Refills: 0 | Status: SHIPPED | OUTPATIENT
Start: 2025-02-26

## 2025-02-26 RX ADMIN — OXYBUTYNIN CHLORIDE 5 MG: 5 TABLET ORAL at 10:49

## 2025-02-26 RX ADMIN — AMLODIPINE BESYLATE 10 MG: 10 TABLET ORAL at 10:49

## 2025-02-26 RX ADMIN — CEFAZOLIN SODIUM 1000 MG: 1 SOLUTION INTRAVENOUS at 10:50

## 2025-02-26 RX ADMIN — LOSARTAN POTASSIUM 100 MG: 50 TABLET, FILM COATED ORAL at 10:49

## 2025-02-26 RX ADMIN — ACETAMINOPHEN 650 MG: 325 TABLET, FILM COATED ORAL at 12:21

## 2025-02-26 RX ADMIN — ACETAMINOPHEN 650 MG: 325 TABLET, FILM COATED ORAL at 05:40

## 2025-02-26 RX ADMIN — PANTOPRAZOLE SODIUM 40 MG: 40 TABLET, DELAYED RELEASE ORAL at 06:02

## 2025-02-26 RX ADMIN — METFORMIN HYDROCHLORIDE 1000 MG: 500 TABLET ORAL at 10:49

## 2025-02-26 RX ADMIN — SODIUM CHLORIDE, SODIUM LACTATE, POTASSIUM CHLORIDE, AND CALCIUM CHLORIDE 125 ML/HR: .6; .31; .03; .02 INJECTION, SOLUTION INTRAVENOUS at 03:30

## 2025-02-26 NOTE — PROGRESS NOTES
Progress Note - Urology   Name: Andriy Breen 74 y.o. male I MRN: [id number]  Unit/Bed#: [place] [address] -01 I Date of Admission: 2/25/2025   Date of Service: 2/26/2025 I Hospital Day: 0    Assessment & Plan  BPH with obstruction/lower urinary tract symptoms  Known BPH with lower urinary tract symptoms and urinary obstruction  POD #1 s/p TURP by Dr. Li  No intraoperative complications reported  Vitals in a.m. labs remain stable  CBI continued running overnight--urine light pink this morning without clots; CBI clamped 0840 per nursing  On exam, Gomez remains inserted draining clear light pink/fruit punch urine  Diet advanced, tolerating well  Pain is overall well-controlled  Ambulating independently    Plan:  Maintain Gomez catheter until Friday (2/28/2025), return to the office for trial of void with nursing staff  Discharge instructions discussed in detail with the patient  Discharge planning ongoing, tentative for this afternoon if urine remains overall clear     Urology service will follow.    Subjective   Andriy is a 74-year-old male, POD #1 s/p TURP completed by Dr. Li for BPH with urinary obstruction.  No issues reported overnight per nursing.  Vitals and labs remain overall stable.  CBI continued overnight, clamped this morning per nursing staff.  Gomez remains inserted draining clear light pink urine.  Diet advanced, tolerating well.  He reports pain located at the suprapubic region but it is mild overall.  Ambulating independently.  Denies chest pain, shortness of breath, dizziness, or fever/chills.  He is cleared for discharge this afternoon.    Objective :  Temp:  [96.8 °F (36 °C)-98.3 °F (36.8 °C)] 97.9 °F (36.6 °C)  HR:  [56-73] 61  BP: (119-170)/(64-82) 128/64  Resp:  [13-20] 18  SpO2:  [95 %-98 %] 97 %  O2 Device: None (Room air)    I/O         02/24 0701 02/25 0700 02/25 0701 02/26 0700 02/26 0701 02/27 0700    P.O.  480     I.V. (mL/kg)  1755 (22.9)     Total Intake(mL/kg)  2235 (29.2)      Urine (mL/kg/hr)  0 -50 (-0.4)    Blood  100     Total Output  100 -50    Net  +2135 +50                 Lines/Drains/Airways       Active Status       Name Placement date Placement time Site Days    Continuous Bladder Irrigation Three-way 02/25/25  1740  Three-way  less than 1                  Physical Exam  Vitals and nursing note reviewed.   Constitutional:       General: He is not in acute distress.     Appearance: Normal appearance. He is well-developed. He is not ill-appearing.   HENT:      Head: Normocephalic and atraumatic.   Eyes:      Conjunctiva/sclera: Conjunctivae normal.   Cardiovascular:      Rate and Rhythm: Normal rate and regular rhythm.      Heart sounds: No murmur heard.  Pulmonary:      Effort: Pulmonary effort is normal. No respiratory distress.      Breath sounds: Normal breath sounds.   Abdominal:      General: Abdomen is flat. There is no distension.      Palpations: Abdomen is soft.      Tenderness: There is abdominal tenderness (bladder spasms).   Genitourinary:     Penis: Normal.       Comments: CBI clamped.  Gomez remains inserted draining light pink urine without clots  Musculoskeletal:         General: No swelling.      Cervical back: Neck supple.   Skin:     General: Skin is warm and dry.      Capillary Refill: Capillary refill takes less than 2 seconds.   Neurological:      Mental Status: He is alert. Mental status is at baseline.   Psychiatric:         Mood and Affect: Mood normal.         Lab Results: I have reviewed the following results:  Recent Labs     02/26/25  0410   WBC 14.37*   HGB 13.4   HCT 40.5      SODIUM 134*   K 4.4      CO2 21   BUN 23   CREATININE 0.81   GLUC 188*       Imaging Results Review: No pertinent imaging studies reviewed.  Other Study Results Review: No additional pertinent studies reviewed.    VTE Pharmacologic Prophylaxis: VTE covered by:    None      VTE Mechanical Prophylaxis: sequential compression device

## 2025-02-26 NOTE — DISCHARGE INSTR - AVS FIRST PAGE
Follow-up office visit has been scheduled with nursing staff on Friday 2/28/25 for james removal   Follow up 3/26/25 for post-op check in   Restart home pain regimen - Tylenol as needed  Colace to avoid constipation has been sent to patient's pharmacy  Levaquin (1 tablet daily) sent to the patients pharmacy to avoid bladder infection (antibiotic)   Ambulate often avoid prolonged sitting or laying  James will stay inserted till end of week - blood is common, large clots or decreased urine production is a red flag                    - may have pain at tip of penis (Vaseline twice daily)  Continue regular diet  No heavy lifting or heavy exercise for 4 weeks  Call office with any concerns or questions you may have prior to scheduled appointment

## 2025-02-26 NOTE — DISCHARGE SUMMARY
Discharge Summary - Urology   Name: Andriy Breen 74 y.o. male I MRN: [id number]  Unit/Bed#: [address] I Date of Admission: 2/25/2025   Date of Service: 2/26/2025 I Hospital Day: 0    Discharge Summary - Andriy Breen 74 y.o. male MRN: [id number]    Unit/Bed#: [place] [address] -01 Encounter: [id number]    Admission Date: 2/25/2025     Discharge Date: 02/26/25    HPI: Andriy Breen is a 74 y.o. male who presented for TURP by Dr. Li.      Procedure(s):  (TURP)  Surgeon(s):  Aj Li MD  2/25/2025    Hospital Course: Andriy is POD #1 s/p TURP by Dr. Li.  No intraoperative complications reported.  Patient was transported the PACU in stable condition.  No issues overnight per nursing.  VSS, and labs remain stable.  CBI clamped this morning per nursing staff.  Gomez catheter remains inserted draining clear light pink urine.  Diet advanced, tolerating well.  Pain is overall well-controlled.  He is ambulating independently.  Maintain Gomez catheter until Friday (2/28), and return to the office for trial of void.  Patient is cleared for discharge.    Discharge Diagnosis: BPH with obstruction/lower urinary tract symptoms    Condition at Discharge: good    Discharge Medications:  See after visit summary for reconciled discharge medications provided to patient and family.  Patient was discharged home on home medications with the addition of Tylenol for pain, Colace for constipation, Levaquin as a prophylactic antibiotic.     Discharge instructions/Information to patient and family:   See after visit summary for written and verbal information which has been provided to patient and family.      Provisions for Follow-Up Care:  See after visit summary for information related to follow-up care and any pertinent home health orders.      Disposition: Home    Planned Readmission: No    Discharge Statement   I spent 10 minutes discharging the patient. This time was spent on the day of discharge. I had direct  contact with the patient on the day of discharge. Additional documentation is required if more than 30 minutes were spent on discharge.     Signature:   Gracy Puga PA-C  Date: 2/26/2025 Time: 1:50 PM

## 2025-02-26 NOTE — PLAN OF CARE
Problem: PAIN - ADULT  Goal: Verbalizes/displays adequate comfort level or baseline comfort level  Description: Interventions:  - Encourage patient to monitor pain and request assistance  - Assess pain using appropriate pain scale  - Administer analgesics based on type and severity of pain and evaluate response  - Implement non-pharmacological measures as appropriate and evaluate response  - Consider cultural and social influences on pain and pain management  - Notify physician/advanced practitioner if interventions unsuccessful or patient reports new pain  Outcome: Progressing     Problem: GENITOURINARY - ADULT  Goal: Maintains or returns to baseline urinary function  Description: INTERVENTIONS:  - Assess urinary function  - Encourage oral fluids to ensure adequate hydration if ordered  - Administer IV fluids as ordered to ensure adequate hydration  - Administer ordered medications as needed  - Offer frequent toileting  - Follow urinary retention protocol if ordered  Outcome: Progressing  Goal: Absence of urinary retention  Description: INTERVENTIONS:  - Assess patient’s ability to void and empty bladder  - Monitor I/O  - Bladder scan as needed  - Discuss with physician/AP medications to alleviate retention as needed  - Discuss catheterization for long term situations as appropriate  Outcome: Progressing  Goal: Urinary catheter remains patent  Description: INTERVENTIONS:  - Assess patency of urinary catheter  - If patient has a chronic james, consider changing catheter if non-functioning  - Follow guidelines for intermittent irrigation of non-functioning urinary catheter  Outcome: Progressing

## 2025-02-26 NOTE — NURSING NOTE
Pt cleared for discharge home at this time. D/C instructions reviewed with and given to pt and spouse at bedside. Scripts sent to preferred pharmacy. IV site and Masimo removed. Cap applied to 3-way Gomez. Gomez teaching provided by RN to pt and spouse. All questions and concerns addressed at this time. Pt assisted to dress. Belongings gathered. Pt escorted to car in wheelchair by spouse and PCA.

## 2025-02-26 NOTE — RESTORATIVE TECHNICIAN NOTE
Restorative Technician Note      Patient Name: Andriy Breen     Restorative Tech Visit Date: 02/26/25  Note Type: Mobility  Patient Position Upon Consult: Bedside chair  Activity Performed: Ambulated  Patient Position at End of Consult: All needs within reach; Bedside chair

## 2025-02-26 NOTE — TELEPHONE ENCOUNTER
POD #1 s/p TURP by Dr. Li.  Patient will need to undergo trial of void on Friday (2/28) in office by nursing staff.  Please assist with scheduling.  Thank you!

## 2025-02-26 NOTE — ASSESSMENT & PLAN NOTE
Known BPH with lower urinary tract symptoms and urinary obstruction  POD #1 s/p TURP by Dr. Li  No intraoperative complications reported  Vitals in a.m. labs remain stable  CBI continued running overnight--urine light pink this morning without clots; CBI clamped 0840 per nursing  On exam, Gomez remains inserted draining clear light pink/fruit punch urine  Diet advanced, tolerating well  Pain is overall well-controlled  Ambulating independently    Plan:  Maintain Gomez catheter until Friday (2/28/2025), return to the office for trial of void with nursing staff  Discharge instructions discussed in detail with the patient  Discharge planning ongoing, tentative for this afternoon if urine remains overall clear

## 2025-02-27 PROCEDURE — 88305 TISSUE EXAM BY PATHOLOGIST: CPT | Performed by: PATHOLOGY

## 2025-02-27 NOTE — TELEPHONE ENCOUNTER
Spoke to pt through  #[id number]    Post Op Note    Andriy Breen is a 74 y.o. male s/p TURP  performed 2/25/25 with Dr. Li.  Pt is seen at the [place] office.     How would you rate your pain on a scale from 1 to 10, 10 being the worst pain ever? 0  Have you had a fever? No  Have your bowel movements been regular? No Pt will start stool softener with 60 oz of fluid per day   Do you have any difficulty urinating? No Pt has a james   If the patient has a james- are you comfortable caring for your james? Yes Is it draining urine? Yes  Do you have any other questions or concerns that I can address at this time?     Pt states he is experiencing burning with urination for which he is taking Tylenol. Pt will take antibiotic to completion.  He is scheduled for james removal/VT tomorrow with the nurse in [place] office.  Confirmed PO appt for 3/26/25.  Pt is asking if he needs to continue tamsulosin.  He was told this will be discussed tomorrow at his OV.  Pt knows to contact the office with any questions or concerns.

## 2025-02-28 ENCOUNTER — TELEPHONE (OUTPATIENT)
Dept: UROLOGY | Facility: MEDICAL CENTER | Age: 75
End: 2025-02-28

## 2025-02-28 ENCOUNTER — PROCEDURE VISIT (OUTPATIENT)
Dept: UROLOGY | Facility: MEDICAL CENTER | Age: 75
End: 2025-02-28
Payer: COMMERCIAL

## 2025-02-28 VITALS
BODY MASS INDEX: 30.12 KG/M2 | DIASTOLIC BLOOD PRESSURE: 70 MMHG | HEIGHT: 63 IN | HEART RATE: 98 BPM | WEIGHT: 170 LBS | SYSTOLIC BLOOD PRESSURE: 115 MMHG

## 2025-02-28 DIAGNOSIS — N13.8 BPH WITH URINARY OBSTRUCTION: Primary | ICD-10-CM

## 2025-02-28 DIAGNOSIS — N40.1 BPH WITH URINARY OBSTRUCTION: Primary | ICD-10-CM

## 2025-02-28 LAB — POST-VOID RESIDUAL VOLUME, ML POC: 113 ML

## 2025-02-28 PROCEDURE — 99024 POSTOP FOLLOW-UP VISIT: CPT

## 2025-02-28 PROCEDURE — 51798 US URINE CAPACITY MEASURE: CPT

## 2025-02-28 NOTE — PROGRESS NOTES
"2/28/2025    Foltrini Jamshid  1950  [id number]    Diagnosis  Chief Complaint    BPH with urinary obstruction       Visit performed with  #[id number]    Patient presents for james removal/VT s/p TURP on 2/25/25 with Dr. Li.  Pt is seen in [place] office    Plan  Return this afternoon for PVR  PO appt scheduled for 3/26/25    Procedure James removal/voiding trial    James catheter removed after deflation of an intact balloon. Patient tolerated well. Encouraged patient to hydrate well and return this afternoon for post void residual.  he knows he may return early if uncomfortable and unable to urinate. Patient agrees to this plan.    This afternoon appt was translated by #[id number]    Patient returned this afternoon. Pt voided in the office.  PVR performed measuring 113 mls.  No james is needed.  Pt asked about taking 3 meds (tamsulosin, finasteride and Trospium).  Advised pt that he can speak to provider at his upcoming  po appt.  ER precautions were reviewed with pt.      Vitals:    02/28/25 0831   BP: 115/70   Pulse: 98   Weight: 77.1 kg (170 lb)   Height: 5' 3\" (1.6 m)           Prema Warren RN       "

## 2025-03-01 ENCOUNTER — HOSPITAL ENCOUNTER (EMERGENCY)
Facility: HOSPITAL | Age: 75
Discharge: HOME | End: 2025-03-01
Attending: EMERGENCY MEDICINE
Payer: COMMERCIAL

## 2025-03-01 VITALS
RESPIRATION RATE: 16 BRPM | SYSTOLIC BLOOD PRESSURE: 159 MMHG | TEMPERATURE: 97.9 F | BODY MASS INDEX: 30.54 KG/M2 | DIASTOLIC BLOOD PRESSURE: 77 MMHG | HEART RATE: 78 BPM | WEIGHT: 172.4 LBS | OXYGEN SATURATION: 98 %

## 2025-03-01 DIAGNOSIS — R33.9 URINARY RETENTION: Primary | ICD-10-CM

## 2025-03-01 DIAGNOSIS — R82.71 BACTERIA IN URINE: ICD-10-CM

## 2025-03-01 DIAGNOSIS — E78.00 HYPERCHOLESTEROLEMIA: ICD-10-CM

## 2025-03-01 LAB
BACTERIA UR QL AUTO: ABNORMAL /HPF
BILIRUB UR QL STRIP: NEGATIVE
CLARITY UR: ABNORMAL
COLOR UR: ABNORMAL
GLUCOSE UR STRIP-MCNC: NEGATIVE MG/DL
HGB UR QL STRIP.AUTO: ABNORMAL
KETONES UR STRIP-MCNC: NEGATIVE MG/DL
LEUKOCYTE ESTERASE UR QL STRIP: ABNORMAL
MUCOUS THREADS UR QL AUTO: ABNORMAL
NITRITE UR QL STRIP: NEGATIVE
NON-SQ EPI CELLS URNS QL MICRO: ABNORMAL /HPF
PH UR STRIP.AUTO: 6 [PH]
PROT UR STRIP-MCNC: ABNORMAL MG/DL
RBC #/AREA URNS AUTO: ABNORMAL /HPF
SP GR UR STRIP.AUTO: 1.01 (ref 1–1.03)
UROBILINOGEN UR STRIP-ACNC: <2 MG/DL
WBC #/AREA URNS AUTO: ABNORMAL /HPF

## 2025-03-01 PROCEDURE — 99284 EMERGENCY DEPT VISIT MOD MDM: CPT | Performed by: EMERGENCY MEDICINE

## 2025-03-01 PROCEDURE — 81001 URINALYSIS AUTO W/SCOPE: CPT | Performed by: EMERGENCY MEDICINE

## 2025-03-01 PROCEDURE — 99283 EMERGENCY DEPT VISIT LOW MDM: CPT

## 2025-03-01 PROCEDURE — 87086 URINE CULTURE/COLONY COUNT: CPT | Performed by: EMERGENCY MEDICINE

## 2025-03-01 RX ORDER — CEPHALEXIN 500 MG/1
500 CAPSULE ORAL EVERY 6 HOURS SCHEDULED
Qty: 28 CAPSULE | Refills: 0 | Status: SHIPPED | OUTPATIENT
Start: 2025-03-01 | End: 2025-03-08

## 2025-03-01 RX ORDER — OXYBUTYNIN CHLORIDE 5 MG/1
5 TABLET, EXTENDED RELEASE ORAL DAILY
Qty: 10 TABLET | Refills: 0 | Status: SHIPPED | OUTPATIENT
Start: 2025-03-01

## 2025-03-01 RX ORDER — TAMSULOSIN HYDROCHLORIDE 0.4 MG/1
0.4 CAPSULE ORAL
Qty: 20 CAPSULE | Refills: 0 | Status: SHIPPED | OUTPATIENT
Start: 2025-03-01

## 2025-03-01 RX ORDER — LIDOCAINE HYDROCHLORIDE 20 MG/ML
1 JELLY TOPICAL ONCE
Status: COMPLETED | OUTPATIENT
Start: 2025-03-01 | End: 2025-03-01

## 2025-03-01 RX ADMIN — LIDOCAINE HYDROCHLORIDE 1 APPLICATION: 20 JELLY TOPICAL at 16:59

## 2025-03-01 RX ADMIN — CEPHALEXIN 500 MG: 250 CAPSULE ORAL at 18:31

## 2025-03-01 NOTE — ED PROVIDER NOTES
Time reflects when diagnosis was documented in both MDM as applicable and the Disposition within this note       Time User Action Codes Description Comment    3/1/2025  6:16 PM Desean Aguirre Add [R33.9] Urinary retention     3/1/2025  6:16 PM Desean Aguirre Add [R82.71] Bacteria in urine           ED Disposition       ED Disposition   Discharge    Condition   Stable    Date/Time   Sat Mar 1, 2025  6:16 PM    Comment   Foltrini Breen discharge to home/self care.                   Assessment & Plan       Medical Decision Making  1. Suprapubic discomfort - Patient with symptoms beginning last night after removal of Gomez catheter in the morning. Will have catheter placed, check urine for infection.     Problems Addressed:  Bacteria in urine: acute illness or injury  Urinary retention: acute illness or injury    Amount and/or Complexity of Data Reviewed  Independent Historian: spouse  Labs: ordered.    Risk  Prescription drug management.        ED Course as of 03/01/25 2038   Sat Mar 01, 2025   1722 Patient's pain improved.        Medications   lidocaine (URO-JET) 2 % urethral/mucosal gel 1 Application (1 Application Urethral Given 3/1/25 1659)   cephalexin (KEFLEX) capsule 500 mg (500 mg Oral Given 3/1/25 1831)       ED Risk Strat Scores                            SBIRT 22yo+      Flowsheet Row Most Recent Value   Initial Alcohol Screen: US AUDIT-C     1. How often do you have a drink containing alcohol? 0 Filed at: 03/01/2025 1620   2. How many drinks containing alcohol do you have on a typical day you are drinking?  0 Filed at: 03/01/2025 1620   3b. FEMALE Any Age, or MALE 65+: How often do you have 4 or more drinks on one occassion? 0 Filed at: 03/01/2025 1620   Audit-C Score 0 Filed at: 03/01/2025 1620   ZULEIKA: How many times in the past year have you...    Used an illegal drug or used a prescription medication for non-medical reasons? Never Filed at: 03/01/2025 1620                            History of Present  Illness       Chief Complaint   Patient presents with    Urinary Retention     Had a TURP on 2/25. Had james removed by urology yesterday. Now having dysuria and pressure like pain.        Past Medical History:   Diagnosis Date    Abnormal echocardiogram 09/15/2017    Abnormal finding on MRI of brain 02/26/2020    Arthritis     OSTEOARTHRITIS OF LUMBAR SPINE    Brain tumor (HCC) 02/26/2020    Carpal tunnel syndrome     COVID     COVID-19 11/29/2022    Diabetes mellitus (HCC)     Elevated prostate specific antigen (PSA)     Erectile dysfunction     GERD (gastroesophageal reflux disease)     H. pylori infection 07/17/2018    History of BPH     [place] (hard of hearing)     Wears bilateral hearing aides    Hx of adenomatous colonic polyps     Hyperlipidemia     Hypertension     Hypertension     Wears dentures     full set of dentures      Past Surgical History:   Procedure Laterality Date    APPENDECTOMY      CARPAL TUNNEL RELEASE      COLONOSCOPY      CYSTOSCOPY W/ DILATION OF BLADDER  01/09/2017    DR. KILO LENZ, [facility]     EGD AND COLONOSCOPY N/A 2/8/2018    Procedure: EGD with biopsy AND COLONOSCOPY with polypectomy with hemo clip application;  Surgeon: Santana Paiz MD;  Location: [facility];  Service: Gastroenterology    NEUROPLASTY / TRANSPOSITION MEDIAN NERVE AT CARPAL TUNNEL      LEFT & RIGHT    DC TRURL ELECTROSURG RESCJ PROSTATE BLEED COMPLETE N/A 2/25/2025    Procedure: (TURP);  Surgeon: Aj Li MD;  Location: [facility];  Service: Urology      Family History   Problem Relation Age of Onset    Heart disease Mother     Hypertension Mother     Ulcers Father     Stomach cancer Father     Diabetes Family     Hypertension Family     RAMY disease Family     Arthritis Family     Anesthesia problems Neg Hx       Social History     Tobacco Use    Smoking status: Never    Smokeless tobacco: Never    Tobacco comments:     Never a smoker or use of any tobacco products per pt    Vaping  Use    Vaping status: Never Used   Substance Use Topics    Alcohol use: Yes     Comment: wine use occasionally    Drug use: No     Comment: Denies any drug use per pt      E-Cigarette/Vaping    E-Cigarette Use Never User     Comments Denies any use per pt       E-Cigarette/Vaping Substances      I have reviewed and agree with the history as documented.     76 YO male presents with suprapubic discomfort, difficulty urinating. States this has been present since last night, intermittent. He states he has the urge to urinate, is only able to proiduce a small amount of urine which does improve his discomfort, though it returns very quickly, he has been urinating about every 5 minutes. Patient has a history of recent TURP, he had an appointment with urology yesterday morning and had the Gomez catheter removed, did pass voiding trial. Pt denies CP/SOB/F/C/N/V/D/C.      History provided by:  Patient   used: No        Review of Systems   Constitutional:  Negative for fever.   HENT:  Negative for dental problem.    Eyes:  Negative for visual disturbance.   Respiratory:  Negative for shortness of breath.    Cardiovascular:  Negative for chest pain.   Gastrointestinal:  Negative for abdominal pain, nausea and vomiting.   Genitourinary:  Negative for dysuria and frequency.   Musculoskeletal:  Negative for neck pain and neck stiffness.   Skin:  Negative for rash.   Neurological:  Negative for dizziness, weakness and light-headedness.   Psychiatric/Behavioral:  Negative for agitation, behavioral problems and confusion.    All other systems reviewed and are negative.          Objective       ED Triage Vitals   Temperature Pulse Blood Pressure Respirations SpO2 Patient Position - Orthostatic VS   03/01/25 1612 03/01/25 1612 03/01/25 1612 03/01/25 1612 03/01/25 1612 03/01/25 1832   97.9 °F (36.6 °C) 88 165/74 18 99 % Lying      Temp src Heart Rate Source BP Location FiO2 (%) Pain Score    -- 03/01/25 1832 03/01/25  1832 -- 03/01/25 1832     Monitor Right arm  No Pain      Vitals      Date and Time Temp Pulse SpO2 Resp BP Pain Score FACES Pain Rating User   03/01/25 1832 -- 78 98 % 16 159/77 No Pain -- JS   03/01/25 1612 97.9 °F (36.6 °C) 88 99 % 18 165/74 -- -- SHAYLEE            Physical Exam  Vitals and nursing note reviewed.   Constitutional:       Appearance: He is well-developed.   HENT:      Head: Normocephalic and atraumatic.   Eyes:      Extraocular Movements: Extraocular movements intact.   Cardiovascular:      Rate and Rhythm: Normal rate.   Pulmonary:      Effort: Pulmonary effort is normal.   Abdominal:      General: There is no distension.      Tenderness: There is abdominal tenderness.   Musculoskeletal:         General: Normal range of motion.      Cervical back: Normal range of motion.   Skin:     Findings: No rash.   Neurological:      Mental Status: He is alert and oriented to person, place, and time.   Psychiatric:         Behavior: Behavior normal.         Results Reviewed       Procedure Component Value Units Date/Time    Urine Microscopic [[id number]]  (Abnormal) Collected: 03/01/25 1704    Lab Status: Final result Specimen: Urine, Indwelling Gomez Catheter Updated: 03/01/25 1815     RBC, UA Innumerable /hpf      WBC, UA 30-50 /hpf      Epithelial Cells None Seen /hpf      Bacteria, UA Occasional /hpf      MUCUS THREADS Occasional    UA (URINE) with reflex to Scope [[id number]]  (Abnormal) Collected: 03/01/25 1704    Lab Status: Final result Specimen: Urine, Indwelling Gomez Catheter Updated: 03/01/25 1716     Color, UA Light Orange     Clarity, UA Turbid     Specific Gravity, UA 1.011     pH, UA 6.0     Leukocytes, UA Small     Nitrite, UA Negative     Protein, UA 30 (1+) mg/dl      Glucose, UA Negative mg/dl      Ketones, UA Negative mg/dl      Urobilinogen, UA <2.0 mg/dl      Bilirubin, UA Negative     Occult Blood, UA Large    Urine culture [[id number]] Collected: 03/01/25 1704    Lab Status: In process  Specimen: Urine, Indwelling Gomez Catheter Updated: 03/01/25 0828            No orders to display       Procedures    ED Medication and Procedure Management   Prior to Admission Medications   Prescriptions Last Dose Informant Patient Reported? Taking?   acetaminophen (TYLENOL) 650 mg CR tablet   No No   Sig: Take 1 tablet (650 mg total) by mouth every 8 (eight) hours as needed for mild pain   amLODIPine (NORVASC) 10 mg tablet   No No   Sig: Take 1 tablet (10 mg total) by mouth daily   candesartan (ATACAND) 32 MG tablet   No No   Sig: TAKE 1 TABLET BY MOUTH DAILY.   docusate sodium (COLACE) 100 mg capsule   No No   Sig: Take 1 capsule (100 mg total) by mouth 2 (two) times a day   ibuprofen (MOTRIN) 600 mg tablet   No No   Sig: Take 1 tablet (600 mg total) by mouth every 8 (eight) hours as needed for mild pain   levofloxacin (LEVAQUIN) 750 mg tablet   No No   Sig: Take 1 tablet (750 mg total) by mouth every 24 hours for 10 days   linaGLIPtin 5 MG TABS   No No   Sig: Take 5 mg by mouth daily   metFORMIN (GLUCOPHAGE) 1000 MG tablet   No No   Sig: Take 1 tablet (1,000 mg total) by mouth 2 (two) times a day with meals   omeprazole (PriLOSEC) 40 MG capsule   No No   Sig: TAKE 1 CAPSULE (40 MG TOTAL) BY MOUTH DAILY.   rosuvastatin (CRESTOR) 20 MG tablet   No No   Sig: TOME 1 TABLETA POR VIA ORAL TODOS LOS ESCOBAR   Patient taking differently: Take 20 mg by mouth daily 2/18/25 Confirmed with [employer] in English - pt taking daily   sildenafil (VIAGRA) 100 mg tablet  Self No No   Sig: Take 1 tablet (100 mg total) by mouth daily as needed for erectile dysfunction   Patient not taking: Reported on 2/28/2025      Facility-Administered Medications: None     Discharge Medication List as of 3/1/2025  6:18 PM        START taking these medications    Details   cephalexin (KEFLEX) 500 mg capsule Take 1 capsule (500 mg total) by mouth every 6 (six) hours for 7 days, Starting Sat 3/1/2025, Until Sat 3/8/2025, Normal      oxybutynin  (DITROPAN-XL) 5 mg 24 hr tablet Take 1 tablet (5 mg total) by mouth daily, Starting Sat 3/1/2025, Normal      tamsulosin (FLOMAX) 0.4 mg Take 1 capsule (0.4 mg total) by mouth daily with dinner, Starting Sat 3/1/2025, Normal           CONTINUE these medications which have NOT CHANGED    Details   acetaminophen (TYLENOL) 650 mg CR tablet Take 1 tablet (650 mg total) by mouth every 8 (eight) hours as needed for mild pain, Starting Wed 2/26/2025, Normal      amLODIPine (NORVASC) 10 mg tablet Take 1 tablet (10 mg total) by mouth daily, Starting Mon 12/2/2024, Normal      candesartan (ATACAND) 32 MG tablet TAKE 1 TABLET BY MOUTH DAILY., Starting Tue 12/17/2024, Normal      docusate sodium (COLACE) 100 mg capsule Take 1 capsule (100 mg total) by mouth 2 (two) times a day, Starting Wed 2/26/2025, Normal      ibuprofen (MOTRIN) 600 mg tablet Take 1 tablet (600 mg total) by mouth every 8 (eight) hours as needed for mild pain, Starting Wed 9/4/2024, Normal      linaGLIPtin 5 MG TABS Take 5 mg by mouth daily, Starting Thu 1/9/2025, Until Mon 7/28/2025, Normal      metFORMIN (GLUCOPHAGE) 1000 MG tablet Take 1 tablet (1,000 mg total) by mouth 2 (two) times a day with meals, Starting Mon 12/2/2024, Normal      omeprazole (PriLOSEC) 40 MG capsule TAKE 1 CAPSULE (40 MG TOTAL) BY MOUTH DAILY., Starting Thu 12/19/2024, Normal      rosuvastatin (CRESTOR) 20 MG tablet TOME 1 TABLETA POR VIA ORAL TODOS LOS ESCOBAR, Normal      sildenafil (VIAGRA) 100 mg tablet Take 1 tablet (100 mg total) by mouth daily as needed for erectile dysfunction, Starting Tue 11/29/2022, Normal           STOP taking these medications       levofloxacin (LEVAQUIN) 750 mg tablet Comments:   Reason for Stopping:             No discharge procedures on file.  ED SEPSIS DOCUMENTATION   Time reflects when diagnosis was documented in both MDM as applicable and the Disposition within this note       Time User Action Codes Description Comment    3/1/2025  6:16 PM Timothy  Desean Harkins [R33.9] Urinary retention     3/1/2025  6:16 PM Desean Aguirre [R82.71] Bacteria in urine                  Desean Aguirre MD  03/01/25 2038

## 2025-03-01 NOTE — DISCHARGE INSTRUCTIONS
Take the Keflex four times daily for the next 7 days, make sure to finish off the entire course.    Take the tamsulosin daily.    If you have continuing pain, discuss this with your urologist to see if they area alright with you taking the oxybutynin.

## 2025-03-01 NOTE — ED NOTES
Patient given extra leg bags and overnight bags. Instructions given on both. Patient verbalized understanding.      Ade Tolbert RN  03/01/25 8415

## 2025-03-03 LAB — BACTERIA UR CULT: NORMAL

## 2025-03-03 RX ORDER — ROSUVASTATIN CALCIUM 20 MG/1
TABLET, COATED ORAL
Qty: 90 TABLET | Refills: 0 | OUTPATIENT
Start: 2025-03-03

## 2025-03-03 NOTE — TELEPHONE ENCOUNTER
Pt called using  # [phone] stated that on Saturday he had to go to the ER to get james cath bc he couldn't urinate after the removal on Friday morning.     Pt called to schedule james removal but he was not given a timeframe.     Please review

## 2025-03-04 ENCOUNTER — TELEPHONE (OUTPATIENT)
Dept: UROLOGY | Facility: MEDICAL CENTER | Age: 75
End: 2025-03-04

## 2025-03-04 ENCOUNTER — TELEPHONE (OUTPATIENT)
Age: 75
End: 2025-03-04

## 2025-03-04 NOTE — TELEPHONE ENCOUNTER
Call placed to pt's daughter. She did not answer. LMOM asking for her to contact the office to review the plan for james catheter removal and to schedule pt.   Office number was provided for call back and scheduling.

## 2025-03-04 NOTE — TELEPHONE ENCOUNTER
Ed visit reviewed. No imaging or PVR noted. Can repeat TOV in 1 week. May need additional time for swelling and edema to subside from the surgery.

## 2025-03-04 NOTE — TELEPHONE ENCOUNTER
Chasity Rivera1 hour ago (11:44 AM)     KH  Summary: pt had james reinserted in ED   Pt daughter Pat calling to speak with clinical regarding james catheter.      Pt was seen in office on 2/28 for TOV. The next day pt was seen in ED for urinary retention and had to have a james catheter reinserted.      Pt daughter is requesting a call back from clinical to discuss timeframe to have james removed. Please review.      Pt cb- [phone] Pat (daughter)

## 2025-03-05 NOTE — TELEPHONE ENCOUNTER
Torie Quach       3/4/25  3:55 PM  Note     Summary: Patient's daughter called and patient is scheduled for james removal   Patient's daughter called and patient is scheduled for james removal and PVR on 3/10 at 9 Am and 3 PM in [place].

## 2025-03-10 ENCOUNTER — PROCEDURE VISIT (OUTPATIENT)
Dept: UROLOGY | Facility: MEDICAL CENTER | Age: 75
End: 2025-03-10
Payer: COMMERCIAL

## 2025-03-10 VITALS
DIASTOLIC BLOOD PRESSURE: 80 MMHG | BODY MASS INDEX: 29.59 KG/M2 | WEIGHT: 167 LBS | SYSTOLIC BLOOD PRESSURE: 142 MMHG | HEIGHT: 63 IN

## 2025-03-10 DIAGNOSIS — N13.8 BPH WITH URINARY OBSTRUCTION: Primary | ICD-10-CM

## 2025-03-10 DIAGNOSIS — N40.1 BPH WITH URINARY OBSTRUCTION: Primary | ICD-10-CM

## 2025-03-10 LAB — POST-VOID RESIDUAL VOLUME, ML POC: 10 ML

## 2025-03-10 PROCEDURE — 51798 US URINE CAPACITY MEASURE: CPT

## 2025-03-10 PROCEDURE — 99024 POSTOP FOLLOW-UP VISIT: CPT

## 2025-03-10 NOTE — PROGRESS NOTES
"3/10/2025    Andriy Breen  1950  [id number]    Diagnosis  Chief Complaint    Urinary Retention; Benign Prostatic Hypertrophy         Patient presents for james catheter removal managed by Dr. Li    Plan  Return to the office this afternoon for PVR s/p james catheter removal in AM.     Procedure James removal/voiding trial    James catheter removed after deflation of an intact balloon. Patient tolerated well. Encouraged patient to hydrate well and return this afternoon for post void residual.  he knows he may return early if uncomfortable and unable to urinate. Patient agrees to this plan.    Patient returned this afternoon. Patient states able to void. Patient voided while in office. Bladder ultrasound performed and PVR measured 10ml.    Encouraged pt to hydrate well and to continue to monitor his symptoms. Pt is scheduled for FU 3- with ALAINA. He is aware he can contact the office with any questions or concerns he should have prior to this appointment.           Vitals:    03/10/25 0903   BP: 142/80   BP Location: Right arm   Patient Position: Sitting   Cuff Size: Standard   Weight: 75.8 kg (167 lb)   Height: 5' 3\" (1.6 m)           Anais Ventura RN       "

## 2025-03-17 DIAGNOSIS — E11.9 TYPE 2 DIABETES MELLITUS WITHOUT COMPLICATION, WITHOUT LONG-TERM CURRENT USE OF INSULIN (HCC): ICD-10-CM

## 2025-03-17 DIAGNOSIS — I10 BENIGN ESSENTIAL HYPERTENSION: ICD-10-CM

## 2025-03-17 RX ORDER — AMLODIPINE BESYLATE 10 MG/1
TABLET ORAL
Qty: 90 TABLET | Refills: 1 | Status: SHIPPED | OUTPATIENT
Start: 2025-03-17

## 2025-03-18 ENCOUNTER — APPOINTMENT (OUTPATIENT)
Dept: LAB | Facility: HOSPITAL | Age: 75
End: 2025-03-18
Payer: COMMERCIAL

## 2025-03-18 DIAGNOSIS — E11.9 TYPE 2 DIABETES MELLITUS WITHOUT COMPLICATION, WITHOUT LONG-TERM CURRENT USE OF INSULIN (HCC): ICD-10-CM

## 2025-03-18 DIAGNOSIS — E11.41 TYPE 2 DIABETES MELLITUS WITH DIABETIC MONONEUROPATHY, WITHOUT LONG-TERM CURRENT USE OF INSULIN (HCC): ICD-10-CM

## 2025-03-18 DIAGNOSIS — R39.9 UTI SYMPTOMS: ICD-10-CM

## 2025-03-18 DIAGNOSIS — I10 BENIGN ESSENTIAL HYPERTENSION: ICD-10-CM

## 2025-03-18 LAB
BACTERIA UR QL AUTO: ABNORMAL /HPF
BILIRUB UR QL STRIP: NEGATIVE
CLARITY UR: CLEAR
COLOR UR: COLORLESS
GLUCOSE UR STRIP-MCNC: NEGATIVE MG/DL
HGB UR QL STRIP.AUTO: ABNORMAL
KETONES UR STRIP-MCNC: NEGATIVE MG/DL
LEUKOCYTE ESTERASE UR QL STRIP: ABNORMAL
NITRITE UR QL STRIP: NEGATIVE
NON-SQ EPI CELLS URNS QL MICRO: ABNORMAL /HPF
PH UR STRIP.AUTO: 5.5 [PH]
PROT UR STRIP-MCNC: NEGATIVE MG/DL
RBC #/AREA URNS AUTO: ABNORMAL /HPF
SP GR UR STRIP.AUTO: 1.01 (ref 1–1.03)
UROBILINOGEN UR STRIP-ACNC: <2 MG/DL
WBC #/AREA URNS AUTO: ABNORMAL /HPF

## 2025-03-18 PROCEDURE — 87086 URINE CULTURE/COLONY COUNT: CPT

## 2025-03-18 PROCEDURE — 81001 URINALYSIS AUTO W/SCOPE: CPT

## 2025-03-19 ENCOUNTER — TELEPHONE (OUTPATIENT)
Age: 75
End: 2025-03-19

## 2025-03-19 LAB — BACTERIA UR CULT: ABNORMAL

## 2025-03-19 NOTE — TELEPHONE ENCOUNTER
Patient called stating he was in the office yesterday for james removal and he did a urine test yesterday and he wants to know the results.     Patient can be reached at [phone] patient is Syrian speaking .

## 2025-03-25 NOTE — PROGRESS NOTES
"Name: Andriy Breen      : 1950      MRN: [id number]  Encounter Provider: ALAINA Cordoba  Encounter Date: 3/26/2025   [facility]  :  Assessment & Plan  Penile pain  On physical exam meatal irritation noted  where catheter was present.  Patient has been utilizing Vaseline cream prescribed Emla cream to help with pain.  Patient will follow-up in 2 weeks  Orders:    lidocaine-prilocaine (EMLA) cream; Apply topically as needed for mild pain    Dysuria  UA dip: small leukocytes  UC ordered   Will follow up once culture is resulted  Orders:    POCT urine dip    Urine culture; Future        History of Present Illness      utilized via iPad  Andriy Breen is a 75 y.o. male who presents for 4 to 6-week follow-up after TURP.  Surgery was completed by Dr. Li on 2025 no complications noted on operative note.  Pathology showed benign tissue of prostate chips. chips.  Catheter was removed on 3/10/2025 and PVR was 10 mL.  Patient reports since catheter was removed he is having dysuria.  Patient also notes since surgery he is having urinary frequency and sometimes having some incontinence episodes.  Patient denies hematuria, chills, flank pain,        Review of Systems   Genitourinary:  Positive for dysuria, frequency, penile pain and urgency. Negative for enuresis and flank pain.          Objective   /70 (BP Location: Left arm, Patient Position: Sitting, Cuff Size: Standard)   Pulse 82   Ht 5' 3\" (1.6 m)   Wt 75.8 kg (167 lb)   SpO2 98%   BMI 29.58 kg/m²     Physical Exam  Vitals reviewed.   Constitutional:       Appearance: Normal appearance.   Cardiovascular:      Rate and Rhythm: Normal rate.   Pulmonary:      Effort: Pulmonary effort is normal.   Genitourinary:     Penis: Uncircumcised. Erythema (present on meatus) and tenderness present.    Skin:     General: Skin is warm.   Neurological:      General: No focal " deficit present.      Mental Status: He is oriented to person, place, and time.   Psychiatric:         Mood and Affect: Mood normal.         Behavior: Behavior normal.           Results   Lab Results   Component Value Date    PSA 0.551 10/18/2024    PSA 0.46 06/03/2023    PSA 1.0 10/26/2021     Lab Results   Component Value Date    GLUCOSE 180 (H) 07/25/2017    CALCIUM 9.3 02/26/2025    K 4.4 02/26/2025    CO2 21 02/26/2025     02/26/2025    BUN 23 02/26/2025    CREATININE 0.81 02/26/2025     Lab Results   Component Value Date    WBC 14.37 (H) 02/26/2025    HGB 13.4 02/26/2025    HCT 40.5 02/26/2025    MCV 87 02/26/2025     02/26/2025       Office Urine Dip  No results found for this or any previous visit (from the past hour).

## 2025-03-26 ENCOUNTER — OFFICE VISIT (OUTPATIENT)
Dept: UROLOGY | Facility: MEDICAL CENTER | Age: 75
End: 2025-03-26
Payer: COMMERCIAL

## 2025-03-26 ENCOUNTER — TELEPHONE (OUTPATIENT)
Age: 75
End: 2025-03-26

## 2025-03-26 VITALS
OXYGEN SATURATION: 98 % | BODY MASS INDEX: 29.59 KG/M2 | WEIGHT: 167 LBS | HEIGHT: 63 IN | SYSTOLIC BLOOD PRESSURE: 132 MMHG | HEART RATE: 82 BPM | DIASTOLIC BLOOD PRESSURE: 70 MMHG

## 2025-03-26 DIAGNOSIS — N48.89 PENILE PAIN: ICD-10-CM

## 2025-03-26 DIAGNOSIS — R30.0 DYSURIA: Primary | ICD-10-CM

## 2025-03-26 LAB
SL AMB  POCT GLUCOSE, UA: ABNORMAL
SL AMB LEUKOCYTE ESTERASE,UA: ABNORMAL
SL AMB POCT BILIRUBIN,UA: ABNORMAL
SL AMB POCT BLOOD,UA: ABNORMAL
SL AMB POCT CLARITY,UA: ABNORMAL
SL AMB POCT COLOR,UA: YELLOW
SL AMB POCT KETONES,UA: ABNORMAL
SL AMB POCT NITRITE,UA: ABNORMAL
SL AMB POCT PH,UA: 5.5
SL AMB POCT SPECIFIC GRAVITY,UA: 1.03
SL AMB POCT URINE PROTEIN: ABNORMAL
SL AMB POCT UROBILINOGEN: 0.2

## 2025-03-26 PROCEDURE — 87086 URINE CULTURE/COLONY COUNT: CPT

## 2025-03-26 PROCEDURE — 81003 URINALYSIS AUTO W/O SCOPE: CPT

## 2025-03-26 PROCEDURE — 99024 POSTOP FOLLOW-UP VISIT: CPT

## 2025-03-26 RX ORDER — LIDOCAINE AND PRILOCAINE 25; 25 MG/G; MG/G
CREAM TOPICAL AS NEEDED
Qty: 1 G | Refills: 1 | Status: SHIPPED | OUTPATIENT
Start: 2025-03-26

## 2025-03-26 NOTE — TELEPHONE ENCOUNTER
PA for LIDO-PRILO CREAM SUBMITTED to [employer]     via      [x][employer]-Case ID #  [id number]        [x]PA sent as URGENT    All office notes, labs and other pertaining documents and studies sent. Clinical questions answered. Awaiting determination from insurance company.     Turnaround time for your insurance to make a decision on your Prior Authorization can take 7-21 business days.

## 2025-03-27 DIAGNOSIS — I10 BENIGN ESSENTIAL HYPERTENSION: ICD-10-CM

## 2025-03-27 RX ORDER — AMLODIPINE BESYLATE 10 MG/1
TABLET ORAL
Qty: 90 TABLET | Refills: 0 | OUTPATIENT
Start: 2025-03-27

## 2025-03-27 NOTE — TELEPHONE ENCOUNTER
PA for LIDO-PRILO APPROVED     Date(s) approved UNTIL 03/26/2026        Patient advised by          []MyChart Message  []Phone call   [x]LMOM  []L/M to call office as no active Communication consent on file  []Unable to leave detailed message as VM not approved on Communication consent       Pharmacy advised by    [x]Fax  []Phone call  []Secure Chat    Specialty Pharmacy    []     Approval letter scanned into Media NO WILL SCAN UPON RECEIPT

## 2025-03-28 ENCOUNTER — RESULTS FOLLOW-UP (OUTPATIENT)
Dept: UROLOGY | Facility: MEDICAL CENTER | Age: 75
End: 2025-03-28

## 2025-03-28 LAB — BACTERIA UR CULT: NORMAL

## 2025-03-28 NOTE — TELEPHONE ENCOUNTER
----- Message from ALAINA Cordoba sent at 3/28/2025 11:26 AM EDT -----  Please let patient no Urine culture came back negative for UTI (please use ) Thank you

## 2025-04-10 ENCOUNTER — OFFICE VISIT (OUTPATIENT)
Dept: UROLOGY | Facility: MEDICAL CENTER | Age: 75
End: 2025-04-10

## 2025-04-10 VITALS
BODY MASS INDEX: 29.59 KG/M2 | DIASTOLIC BLOOD PRESSURE: 68 MMHG | HEART RATE: 82 BPM | HEIGHT: 63 IN | WEIGHT: 167 LBS | RESPIRATION RATE: 21 BRPM | OXYGEN SATURATION: 98 % | SYSTOLIC BLOOD PRESSURE: 118 MMHG

## 2025-04-10 DIAGNOSIS — N13.8 BPH WITH URINARY OBSTRUCTION: Primary | ICD-10-CM

## 2025-04-10 DIAGNOSIS — N40.1 BPH WITH URINARY OBSTRUCTION: Primary | ICD-10-CM

## 2025-04-10 PROCEDURE — 99024 POSTOP FOLLOW-UP VISIT: CPT | Performed by: UROLOGY

## 2025-04-10 NOTE — PROGRESS NOTES
"   HISTORY:    Now 6 weeks out from TURP.  9 g benign    Preop he had lots of urgency, frequency, urge incontinence.    He says his stream is stronger now, but he still has lots of urgency.  No hematuria recently         ASSESSMENT / PLAN:    1.  He had some penile inflammation at last visit 2, this is now resolved    2.  With all the frequency urgency had preop, this will take longer to resolve.  He is emptying well.    He can stop tamsulosin.    He still is oxybutynin 5 mg daily.  Follow-up 2 months        Review of Systems      Objective:     Physical Exam  Genitourinary:     Comments: Penis testes normal          0   Lab Value Date/Time    PSA 0.551 10/18/2024 0849    PSA 0.46 06/03/2023 1010    PSA 1.0 10/26/2021 0934    PSA 2.2 10/11/2019 1044    PSA 4.2 (H) 07/25/2017 0000   ]  BUN   Date Value Ref Range Status   02/26/2025 23 5 - 25 mg/dL Final   06/04/2019 27 (H) 7 - 25 mg/dL Final     Creatinine   Date Value Ref Range Status   02/26/2025 0.81 0.60 - 1.30 mg/dL Final     Comment:     Standardized to IDMS reference method     No components found for: \"CBC\"    Patient Active Problem List   Diagnosis    Type 2 diabetes mellitus with diabetic mononeuropathy, without long-term current use of insulin (HCC)    Overactive bladder    Osteoarthritis of lumbar spine    Hyperlipidemia    Grade II internal hemorrhoids    Type 2 diabetes mellitus without complication, without long-term current use of insulin (HCC)    Dilated cardiomyopathy (HCC)    Chronic GERD    BPH with obstruction/lower urinary tract symptoms    Bilateral carpal tunnel syndrome    Benign essential hypertension    Erectile dysfunction    Screening for diabetic retinopathy    Tinea pedis of right foot    Primary osteoarthritis of left shoulder    Sensorineural hearing loss (SNHL) of left ear with unrestricted hearing of right ear    Subacromial bursitis of left shoulder joint    Rotator cuff tendonitis, left    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HCC)    Nephrolithiasis    " Onychomycosis of multiple toenails with type 2 diabetes mellitus  (HCC)    Chronic bilateral low back pain with bilateral sciatica    Benign essential tremor    Lumbar radiculopathy    Lipoma of neck    Constipation        Patient ID: Andriy Breen is a 75 y.o. male.      Current Outpatient Medications:     acetaminophen (TYLENOL) 650 mg CR tablet, Take 1 tablet (650 mg total) by mouth every 8 (eight) hours as needed for mild pain, Disp: 30 tablet, Rfl: 0    amLODIPine (NORVASC) 10 mg tablet, TOME 1 TABLETA POR VIA ORAL TODOS LOS ESCOBAR, Disp: 90 tablet, Rfl: 1    candesartan (ATACAND) 32 MG tablet, TAKE 1 TABLET BY MOUTH DAILY., Disp: 90 tablet, Rfl: 1    docusate sodium (COLACE) 100 mg capsule, Take 1 capsule (100 mg total) by mouth 2 (two) times a day, Disp: 14 capsule, Rfl: 0    lidocaine-prilocaine (EMLA) cream, Apply topically as needed for mild pain, Disp: 1 g, Rfl: 1    linaGLIPtin 5 MG TABS, Take 5 mg by mouth daily, Disp: 100 tablet, Rfl: 1    metFORMIN (GLUCOPHAGE) 1000 MG tablet, TOME 1 TABLETA POR VIA ORAL DOS VECES AL LUIS ALBERTO CON LAS COMIDAS, Disp: 180 tablet, Rfl: 1    omeprazole (PriLOSEC) 40 MG capsule, TAKE 1 CAPSULE (40 MG TOTAL) BY MOUTH DAILY., Disp: 90 capsule, Rfl: 1    oxybutynin (DITROPAN-XL) 5 mg 24 hr tablet, Take 1 tablet (5 mg total) by mouth daily, Disp: 10 tablet, Rfl: 0    rosuvastatin (CRESTOR) 20 MG tablet, TOME 1 TABLETA POR VIA ORAL TODOS LOS ESCOBAR, Disp: 90 tablet, Rfl: 1    sildenafil (VIAGRA) 100 mg tablet, Take 1 tablet (100 mg total) by mouth daily as needed for erectile dysfunction, Disp: 3 tablet, Rfl: 5    ibuprofen (MOTRIN) 600 mg tablet, Take 1 tablet (600 mg total) by mouth every 8 (eight) hours as needed for mild pain, Disp: 90 tablet, Rfl: 1

## 2025-04-15 DIAGNOSIS — E11.41 TYPE 2 DIABETES MELLITUS WITH DIABETIC MONONEUROPATHY, WITHOUT LONG-TERM CURRENT USE OF INSULIN (HCC): ICD-10-CM

## 2025-05-03 DIAGNOSIS — E11.9 TYPE 2 DIABETES MELLITUS WITHOUT COMPLICATION, WITHOUT LONG-TERM CURRENT USE OF INSULIN (HCC): ICD-10-CM

## 2025-05-03 DIAGNOSIS — R33.9 URINARY RETENTION: ICD-10-CM

## 2025-05-04 RX ORDER — OXYBUTYNIN CHLORIDE 5 MG/1
5 TABLET, EXTENDED RELEASE ORAL DAILY
Qty: 10 TABLET | Refills: 0 | Status: SHIPPED | OUTPATIENT
Start: 2025-05-04 | End: 2025-05-05 | Stop reason: SDUPTHER

## 2025-05-05 DIAGNOSIS — R33.9 URINARY RETENTION: ICD-10-CM

## 2025-05-07 RX ORDER — OXYBUTYNIN CHLORIDE 5 MG/1
5 TABLET, EXTENDED RELEASE ORAL DAILY
Qty: 90 TABLET | Refills: 0 | Status: SHIPPED | OUTPATIENT
Start: 2025-05-07

## 2025-05-16 ENCOUNTER — ESTABLISHED COMPREHENSIVE EXAM (OUTPATIENT)
Dept: URBAN - METROPOLITAN AREA CLINIC 6 | Facility: CLINIC | Age: 75
End: 2025-05-16

## 2025-05-16 DIAGNOSIS — H25.813: ICD-10-CM

## 2025-05-16 DIAGNOSIS — H02.831: ICD-10-CM

## 2025-05-16 DIAGNOSIS — H02.834: ICD-10-CM

## 2025-05-16 DIAGNOSIS — H43.812: ICD-10-CM

## 2025-05-16 DIAGNOSIS — H04.123: ICD-10-CM

## 2025-05-16 DIAGNOSIS — E11.9: ICD-10-CM

## 2025-05-16 PROCEDURE — 92014 COMPRE OPH EXAM EST PT 1/>: CPT

## 2025-05-16 ASSESSMENT — VISUAL ACUITY
OS_CC: 20/30+1
OD_CC: 20/25

## 2025-05-16 ASSESSMENT — TONOMETRY
OD_IOP_MMHG: 11
OS_IOP_MMHG: 16

## 2025-05-27 NOTE — PATIENT COMMUNICATION
PT CALLED STATING HE IS READY TO SCHEDULE HIS MRI BRAIN FOR 2 YEAR F/U PER LOV 11/28/2022 ML/EM.    PLEASE NOTIFY PT WHEN MRI ORDER IS UPDATED SO HE CAN CALL CENTRAL SCHEDULING.    PLEASE CB PT TO SCHED 2YR F/U. PAST F/U'S HAVE BEEN [facility], PLEASE CLARIFY HOW BEST TO SCHEDULE F/U AS THERE IS NO RECALL APPT TO SCHEDULE FROM.     #[id number]      THANK YOU

## 2025-05-29 ENCOUNTER — RA CDI HCC (OUTPATIENT)
Dept: OTHER | Facility: HOSPITAL | Age: 75
End: 2025-05-29

## 2025-05-30 ENCOUNTER — APPOINTMENT (OUTPATIENT)
Dept: LAB | Facility: HOSPITAL | Age: 75
End: 2025-05-30
Payer: COMMERCIAL

## 2025-05-30 DIAGNOSIS — I10 BENIGN ESSENTIAL HYPERTENSION: ICD-10-CM

## 2025-05-30 DIAGNOSIS — Z01.818 PRE-PROCEDURAL EXAMINATION: ICD-10-CM

## 2025-05-30 DIAGNOSIS — E11.9 TYPE 2 DIABETES MELLITUS WITHOUT COMPLICATION, WITHOUT LONG-TERM CURRENT USE OF INSULIN (HCC): ICD-10-CM

## 2025-05-30 DIAGNOSIS — E11.41 TYPE 2 DIABETES MELLITUS WITH DIABETIC MONONEUROPATHY, WITHOUT LONG-TERM CURRENT USE OF INSULIN (HCC): ICD-10-CM

## 2025-05-30 DIAGNOSIS — E78.1 PURE HYPERTRIGLYCERIDEMIA: ICD-10-CM

## 2025-05-30 LAB
ALBUMIN SERPL BCG-MCNC: 4 G/DL (ref 3.5–5)
ALP SERPL-CCNC: 63 U/L (ref 34–104)
ALT SERPL W P-5'-P-CCNC: 11 U/L (ref 7–52)
ANION GAP SERPL CALCULATED.3IONS-SCNC: 6 MMOL/L (ref 4–13)
AST SERPL W P-5'-P-CCNC: 12 U/L (ref 13–39)
BASOPHILS # BLD AUTO: 0.06 THOUSANDS/ÂΜL (ref 0–0.1)
BASOPHILS NFR BLD AUTO: 1 % (ref 0–1)
BILIRUB SERPL-MCNC: 0.5 MG/DL (ref 0.2–1)
BUN SERPL-MCNC: 22 MG/DL (ref 5–25)
CALCIUM SERPL-MCNC: 9.6 MG/DL (ref 8.4–10.2)
CHLORIDE SERPL-SCNC: 106 MMOL/L (ref 96–108)
CHOLEST SERPL-MCNC: 130 MG/DL (ref ?–200)
CO2 SERPL-SCNC: 26 MMOL/L (ref 21–32)
CREAT SERPL-MCNC: 0.76 MG/DL (ref 0.6–1.3)
CREAT UR-MCNC: 125.6 MG/DL
EOSINOPHIL # BLD AUTO: 0.16 THOUSAND/ÂΜL (ref 0–0.61)
EOSINOPHIL NFR BLD AUTO: 2 % (ref 0–6)
ERYTHROCYTE [DISTWIDTH] IN BLOOD BY AUTOMATED COUNT: 14.4 % (ref 11.6–15.1)
EST. AVERAGE GLUCOSE BLD GHB EST-MCNC: 148 MG/DL
GFR SERPL CREATININE-BSD FRML MDRD: 89 ML/MIN/1.73SQ M
GLUCOSE P FAST SERPL-MCNC: 134 MG/DL (ref 65–99)
HBA1C MFR BLD: 6.8 %
HCT VFR BLD AUTO: 41.1 % (ref 36.5–49.3)
HDLC SERPL-MCNC: 42 MG/DL
HGB BLD-MCNC: 13.5 G/DL (ref 12–17)
IMM GRANULOCYTES # BLD AUTO: 0.04 THOUSAND/UL (ref 0–0.2)
IMM GRANULOCYTES NFR BLD AUTO: 0 % (ref 0–2)
LDLC SERPL CALC-MCNC: 59 MG/DL (ref 0–100)
LYMPHOCYTES # BLD AUTO: 4.03 THOUSANDS/ÂΜL (ref 0.6–4.47)
LYMPHOCYTES NFR BLD AUTO: 44 % (ref 14–44)
MCH RBC QN AUTO: 29.5 PG (ref 26.8–34.3)
MCHC RBC AUTO-ENTMCNC: 32.8 G/DL (ref 31.4–37.4)
MCV RBC AUTO: 90 FL (ref 82–98)
MICROALBUMIN UR-MCNC: 36.6 MG/L
MICROALBUMIN/CREAT 24H UR: 29 MG/G CREATININE (ref 0–30)
MONOCYTES # BLD AUTO: 0.78 THOUSAND/ÂΜL (ref 0.17–1.22)
MONOCYTES NFR BLD AUTO: 9 % (ref 4–12)
NEUTROPHILS # BLD AUTO: 3.99 THOUSANDS/ÂΜL (ref 1.85–7.62)
NEUTS SEG NFR BLD AUTO: 44 % (ref 43–75)
NONHDLC SERPL-MCNC: 88 MG/DL
NRBC BLD AUTO-RTO: 0 /100 WBCS
PLATELET # BLD AUTO: 245 THOUSANDS/UL (ref 149–390)
PMV BLD AUTO: 9.4 FL (ref 8.9–12.7)
POTASSIUM SERPL-SCNC: 4.3 MMOL/L (ref 3.5–5.3)
PROT SERPL-MCNC: 6.9 G/DL (ref 6.4–8.4)
RBC # BLD AUTO: 4.58 MILLION/UL (ref 3.88–5.62)
SODIUM SERPL-SCNC: 138 MMOL/L (ref 135–147)
TRIGL SERPL-MCNC: 145 MG/DL (ref ?–150)
WBC # BLD AUTO: 9.06 THOUSAND/UL (ref 4.31–10.16)

## 2025-05-30 PROCEDURE — 82570 ASSAY OF URINE CREATININE: CPT

## 2025-05-30 PROCEDURE — 83036 HEMOGLOBIN GLYCOSYLATED A1C: CPT

## 2025-05-30 PROCEDURE — 36415 COLL VENOUS BLD VENIPUNCTURE: CPT

## 2025-05-30 PROCEDURE — 80061 LIPID PANEL: CPT

## 2025-05-30 PROCEDURE — 82043 UR ALBUMIN QUANTITATIVE: CPT

## 2025-05-30 PROCEDURE — 80053 COMPREHEN METABOLIC PANEL: CPT

## 2025-06-03 ENCOUNTER — OFFICE VISIT (OUTPATIENT)
Age: 75
End: 2025-06-03
Payer: COMMERCIAL

## 2025-06-03 VITALS
OXYGEN SATURATION: 98 % | BODY MASS INDEX: 29.3 KG/M2 | DIASTOLIC BLOOD PRESSURE: 62 MMHG | SYSTOLIC BLOOD PRESSURE: 118 MMHG | HEIGHT: 64 IN | WEIGHT: 171.6 LBS | HEART RATE: 77 BPM | TEMPERATURE: 98.7 F

## 2025-06-03 DIAGNOSIS — E11.41 TYPE 2 DIABETES MELLITUS WITH DIABETIC MONONEUROPATHY, WITHOUT LONG-TERM CURRENT USE OF INSULIN (HCC): Primary | ICD-10-CM

## 2025-06-03 DIAGNOSIS — M47.816 SPONDYLOSIS OF LUMBAR REGION WITHOUT MYELOPATHY OR RADICULOPATHY: ICD-10-CM

## 2025-06-03 DIAGNOSIS — D17.0 LIPOMA OF NECK: ICD-10-CM

## 2025-06-03 DIAGNOSIS — D17.22 LIPOMA OF LEFT UPPER EXTREMITY: ICD-10-CM

## 2025-06-03 DIAGNOSIS — I10 ESSENTIAL HYPERTENSION: ICD-10-CM

## 2025-06-03 DIAGNOSIS — K21.9 CHRONIC GERD: ICD-10-CM

## 2025-06-03 DIAGNOSIS — D43.2 SUBEPENDYMOMA (HCC): ICD-10-CM

## 2025-06-03 PROBLEM — I42.0 DILATED CARDIOMYOPATHY (HCC): Status: RESOLVED | Noted: 2017-09-15 | Resolved: 2025-06-03

## 2025-06-03 PROCEDURE — 99214 OFFICE O/P EST MOD 30 MIN: CPT | Performed by: INTERNAL MEDICINE

## 2025-06-03 PROCEDURE — G2211 COMPLEX E/M VISIT ADD ON: HCPCS | Performed by: INTERNAL MEDICINE

## 2025-06-03 RX ORDER — OMEPRAZOLE 40 MG/1
40 CAPSULE, DELAYED RELEASE ORAL DAILY
Qty: 100 CAPSULE | Refills: 1 | Status: SHIPPED | OUTPATIENT
Start: 2025-06-03

## 2025-06-03 RX ORDER — IBUPROFEN 600 MG/1
600 TABLET, FILM COATED ORAL EVERY 8 HOURS PRN
Qty: 90 TABLET | Refills: 1 | Status: SHIPPED | OUTPATIENT
Start: 2025-06-03

## 2025-06-03 RX ORDER — CANDESARTAN 32 MG/1
32 TABLET ORAL DAILY
Qty: 100 TABLET | Refills: 1 | Status: SHIPPED | OUTPATIENT
Start: 2025-06-03

## 2025-06-03 NOTE — ASSESSMENT & PLAN NOTE
Low back pain with right-sided sciatica.  He has had 1 epidural injection.  Continue with ibuprofen and omeprazole, applications of local Lidoderm patches and heating pad along with stretching exercises.    Orders:    ibuprofen (MOTRIN) 600 mg tablet; Take 1 tablet (600 mg total) by mouth every 8 (eight) hours as needed for mild pain

## 2025-06-03 NOTE — ASSESSMENT & PLAN NOTE
Nicely controlled type 2 diabetes.  Complaining that linagliptin is too expensive and would like to have a cheaper alternative medication.  He is going to price Januvia at his local pharmacy.  Lab Results   Component Value Date    HGBA1C 6.8 (H) 05/30/2025       Orders:    linaGLIPtin 5 MG TABS; Take 5 mg by mouth daily    Comprehensive metabolic panel; Future    Lipid panel; Future    Hemoglobin A1C; Future

## 2025-06-03 NOTE — PROGRESS NOTES
Name: Andriy Breen      : 1950      MRN: [id number]  Encounter Provider: Lenny Pires MD  Encounter Date: 6/3/2025   [facility]  :  Assessment & Plan  Essential hypertension  Blood pressure is nicely controlled on current medications.  Orders:    candesartan (ATACAND) 32 MG tablet; Take 1 tablet (32 mg total) by mouth daily    CBC and differential; Future    Comprehensive metabolic panel; Future    Spondylosis of lumbar region without myelopathy or radiculopathy  Low back pain with right-sided sciatica.  He has had 1 epidural injection.  Continue with ibuprofen and omeprazole, applications of local Lidoderm patches and heating pad along with stretching exercises.    Orders:    ibuprofen (MOTRIN) 600 mg tablet; Take 1 tablet (600 mg total) by mouth every 8 (eight) hours as needed for mild pain    Chronic GERD  Omeprazole was renewed.    Orders:    omeprazole (PriLOSEC) 40 MG capsule; Take 1 capsule (40 mg total) by mouth daily    Type 2 diabetes mellitus with diabetic mononeuropathy, without long-term current use of insulin (HCC)  Nicely controlled type 2 diabetes.  Complaining that linagliptin is too expensive and would like to have a cheaper alternative medication.  He is going to price Januvia at his local pharmacy.  Lab Results   Component Value Date    HGBA1C 6.8 (H) 2025       Orders:    linaGLIPtin 5 MG TABS; Take 5 mg by mouth daily    Comprehensive metabolic panel; Future    Lipid panel; Future    Hemoglobin A1C; Future    Lipoma of neck  Will observe and if there is significant enlargement will refer to surgery.         Lipoma of left upper extremity  Asymptomatic.  Observation only.         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HCC)  Scheduled for follow-up MRI on 2025                History of Present Illness   Patient presents to the office for follow-up visit.  Recently he has been having difficulty with some low back pain and  "right-sided sciatica.  He states that he was seen with pain management and has undergone 2 epidural steroid injections.  He continues to manage with heating pads, Lidoderm patches and ibuprofen which the patient states provides the best relief.  Recent labs are reviewed.  Lipids are at goal levels.  There is a small amount of protein present in his urinalysis.  CBC is essentially unremarkable.  Metabolic panel was significant for a fasting glucose of 134.  Hemoglobin A1c is 6.8% indicative of good diabetic control.  He would like to see if there are different diabetes medications available due to the cost      Review of Systems   Constitutional: Negative.    HENT: Negative.     Eyes: Negative.    Respiratory: Negative.     Cardiovascular: Negative.    Gastrointestinal: Negative.    Endocrine: Negative.    Genitourinary: Negative.    Musculoskeletal: Negative.    Skin: Negative.    Neurological:  Positive for dizziness.   Hematological: Negative.    Psychiatric/Behavioral: Negative.         Objective   /62 (BP Location: Left arm, Patient Position: Sitting, Cuff Size: Standard)   Pulse 77   Temp 98.7 °F (37.1 °C) (Temporal)   Ht 5' 3.7\" (1.618 m)   Wt 77.8 kg (171 lb 9.6 oz)   SpO2 98%   BMI 29.73 kg/m²      Physical Exam  Vitals reviewed.   Constitutional:       General: He is not in acute distress.     Appearance: Normal appearance. He is not ill-appearing, toxic-appearing or diaphoretic.   HENT:      Head: Normocephalic and atraumatic.      Right Ear: External ear normal. There is no impacted cerumen.      Left Ear: External ear normal. There is no impacted cerumen.      Nose: Nose normal. No congestion or rhinorrhea.      Mouth/Throat:      Mouth: Mucous membranes are moist.      Pharynx: Oropharynx is clear.     Eyes:      General: No scleral icterus.     Conjunctiva/sclera: Conjunctivae normal.      Pupils: Pupils are equal, round, and reactive to light.     Neck:      Vascular: No carotid bruit.     "  Comments: There is a large lipoma at the left side of his neck at the base.  It is soft and freely mobile.  We will monitor and should there be continued enlargement will consider some imaging ultrasound  Cardiovascular:      Rate and Rhythm: Normal rate and regular rhythm.      Heart sounds: Normal heart sounds. No murmur heard.  Pulmonary:      Effort: Pulmonary effort is normal. No respiratory distress.      Breath sounds: Normal breath sounds.   Abdominal:      General: Abdomen is flat. There is no distension.      Tenderness: There is no abdominal tenderness. There is no guarding.     Musculoskeletal:         General: Deformity (Small lipoma approximately 3 cm diameter on the left  forearm) present. No tenderness. Normal range of motion.      Cervical back: No tenderness.   Lymphadenopathy:      Cervical: No cervical adenopathy.     Skin:     General: Skin is warm and dry.      Capillary Refill: Capillary refill takes less than 2 seconds.      Coloration: Skin is not jaundiced or pale.      Findings: No bruising, erythema, lesion or rash.     Neurological:      General: No focal deficit present.      Mental Status: He is alert and oriented to person, place, and time. Mental status is at baseline.     Psychiatric:         Mood and Affect: Mood normal.         Behavior: Behavior normal.         Thought Content: Thought content normal.         Judgment: Judgment normal.

## 2025-06-03 NOTE — ASSESSMENT & PLAN NOTE
Omeprazole was renewed.    Orders:    omeprazole (PriLOSEC) 40 MG capsule; Take 1 capsule (40 mg total) by mouth daily

## 2025-06-11 ENCOUNTER — HOSPITAL ENCOUNTER (OUTPATIENT)
Dept: MRI IMAGING | Facility: HOSPITAL | Age: 75
Discharge: HOME | End: 2025-06-11
Attending: NEUROLOGICAL SURGERY
Payer: COMMERCIAL

## 2025-06-11 DIAGNOSIS — D43.2 SUBEPENDYMOMA (HCC): ICD-10-CM

## 2025-06-11 PROCEDURE — A9585 GADOBUTROL INJECTION: HCPCS | Performed by: NEUROLOGICAL SURGERY

## 2025-06-11 PROCEDURE — 70553 MRI BRAIN STEM W/O & W/DYE: CPT

## 2025-06-11 RX ORDER — GADOBUTROL 604.72 MG/ML
7.5 INJECTION INTRAVENOUS
Status: COMPLETED | OUTPATIENT
Start: 2025-06-11 | End: 2025-06-11

## 2025-06-11 RX ADMIN — GADOBUTROL 7.5 ML: 604.72 INJECTION INTRAVENOUS at 19:44

## 2025-06-16 ENCOUNTER — OFFICE VISIT (OUTPATIENT)
Dept: UROLOGY | Facility: MEDICAL CENTER | Age: 75
End: 2025-06-16
Payer: COMMERCIAL

## 2025-06-16 VITALS
WEIGHT: 173 LBS | OXYGEN SATURATION: 97 % | DIASTOLIC BLOOD PRESSURE: 80 MMHG | SYSTOLIC BLOOD PRESSURE: 118 MMHG | HEART RATE: 82 BPM | HEIGHT: 64 IN | BODY MASS INDEX: 29.53 KG/M2

## 2025-06-16 DIAGNOSIS — N40.1 BPH WITH URINARY OBSTRUCTION: ICD-10-CM

## 2025-06-16 DIAGNOSIS — R35.0 URINARY FREQUENCY: ICD-10-CM

## 2025-06-16 DIAGNOSIS — N13.8 BPH WITH URINARY OBSTRUCTION: ICD-10-CM

## 2025-06-16 DIAGNOSIS — N32.81 OVERACTIVE BLADDER: Primary | ICD-10-CM

## 2025-06-16 LAB — POST-VOID RESIDUAL VOLUME, ML POC: 32 ML

## 2025-06-16 PROCEDURE — 51798 US URINE CAPACITY MEASURE: CPT | Performed by: UROLOGY

## 2025-06-16 PROCEDURE — 99024 POSTOP FOLLOW-UP VISIT: CPT | Performed by: UROLOGY

## 2025-06-16 NOTE — PROGRESS NOTES
Follow-up TURP in February 2025.  9 g benign    At the 2-month visit, he was still having tremendous frequency urgency and nocturia.  We started oxybutynin 5 mg daily.    Since then he is doing great.  For the past month he has had only nocturia x 1, good stream, feels much better with less daytime frequency.    I recommend he stop oxybutynin now.    He will let me know if is any problems with that.    If he continues to do well, follow-up 2 years

## 2025-06-17 ENCOUNTER — OFFICE VISIT (OUTPATIENT)
Dept: NEUROSURGERY | Facility: CLINIC | Age: 75
End: 2025-06-17
Payer: COMMERCIAL

## 2025-06-17 ENCOUNTER — TELEPHONE (OUTPATIENT)
Dept: NEUROSURGERY | Facility: CLINIC | Age: 75
End: 2025-06-17

## 2025-06-17 VITALS
DIASTOLIC BLOOD PRESSURE: 82 MMHG | WEIGHT: 174 LBS | HEART RATE: 62 BPM | SYSTOLIC BLOOD PRESSURE: 132 MMHG | OXYGEN SATURATION: 98 % | RESPIRATION RATE: 17 BRPM | BODY MASS INDEX: 30.83 KG/M2 | TEMPERATURE: 98.7 F | HEIGHT: 63 IN

## 2025-06-17 DIAGNOSIS — D43.2 SUBEPENDYMOMA (HCC): Primary | ICD-10-CM

## 2025-06-17 DIAGNOSIS — R25.1 TREMOR OF BOTH OUTSTRETCHED HANDS: ICD-10-CM

## 2025-06-17 PROCEDURE — 99214 OFFICE O/P EST MOD 30 MIN: CPT | Performed by: NURSE PRACTITIONER

## 2025-06-17 NOTE — ASSESSMENT & PLAN NOTE
As addressed in HPI  Current to office as a 3-year follow-up visit was due for a 2-year follow-up visit last office visit 11/28/2022 was stable and plan for a 2-year follow-up.  Her joint with Dr. Best.  He is wearing a left hearing aide , reports this is the ear  that is most severe. ring loss /HA, has a right but rarely wears   Complained of an episode of dizziness while he was driving questions if it is related to the growth in his head.  Reports he questions his family doctor who indicated it may be related to an adverse effect of his medications.      Imagining  6/11/25 MRI BRAIN WITH AND WITHOUT CONTRAST BRAIN PARENCHYMA: Revisualized mass in the inferior aspect of the fourth ventricle measures 1.1 x 0.9 cm (series 7 image 5) and is unchanged in size. Revisualized small nodular focus of enhancement on the anterior aspect of the mass (series 11 image 5) is unchanged in size.  Unchanged size of the mass in the inferior fourth ventricle presumed to represent subependymoma. This is stable dating back to 2019.     Plan  Reviewed imaging extensively with patient and wife.  Discussed that mass is unlikely cause of tremors, hearing loss, or an isolated episode of dizziness   Since imaging has been stable over the past 3 years, can extend surveillance out to 2 years.  Reviewed red flag signs of hydrocephalus and documented in AVS.  n.  Refer to neurology  for w/u essential  tremor , reports 2 uncles, his fathers  brothers  has a tremor  and the one became so stiff /bad he could not move   Need  w/u for familial essential tremor Need  w/u for familial essential tremor , fine tremor  noted  in both outstretched hands.  Hydrocephalus and ependymoma teaching in AVS.    Plan for follow-up in 2 years, ordered MRI brain due 6/11/2027.  Request checkout to schedule a follow-up appointment within 3 days to 1 week of imaging completion as a joint with Dr. Garduno and an KATRIN.    Orders:    MRI brain with and without contrast;  Future

## 2025-06-17 NOTE — PROGRESS NOTES
Name: Andriy Breen      : 1950      MRN: [id number]  Encounter Provider: ALAINA Simms  Encounter Date: 2025   [facility]  :  Assessment & Plan  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HCC)  As addressed in HPI  Current to office as a 3-year follow-up visit was due for a 2-year follow-up visit last office visit 2022 was stable and plan for a 2-year follow-up.  Her joint with Dr. Best.  He is wearing a left hearing aide , reports this is the ear  that is most severe. ring loss /HA, has a right but rarely wears   Complained of an episode of dizziness while he was driving questions if it is related to the growth in his head.  Reports he questions his family doctor who indicated it may be related to an adverse effect of his medications.      Imagining  25 MRI BRAIN WITH AND WITHOUT CONTRAST BRAIN PARENCHYMA: Revisualized mass in the inferior aspect of the fourth ventricle measures 1.1 x 0.9 cm (series 7 image 5) and is unchanged in size. Revisualized small nodular focus of enhancement on the anterior aspect of the mass (series 11 image 5) is unchanged in size.  Unchanged size of the mass in the inferior fourth ventricle presumed to represent subependymoma. This is stable dating back to 2019.     Plan  Reviewed imaging extensively with patient and wife.  Discussed that mass is unlikely cause of tremors, hearing loss, or an isolated episode of dizziness   Since imaging has been stable over the past 3 years, can extend surveillance out to 2 years.  Reviewed red flag signs of hydrocephalus and documented in AVS.  n.  Refer to neurology  for w/u essential  tremor , reports 2 uncles, his fathers  brothers  has a tremor  and the one became so stiff /bad he could not move   Need  w/u for familial essential tremor Need  w/u for familial essential tremor , fine tremor  noted  in both outstretched hands.  Hydrocephalus and ependymoma teaching in AVS.    Plan for  follow-up in 2 years, ordered MRI brain due 6/11/2027.  Request checkout to schedule a follow-up appointment within 3 days to 1 week of imaging completion as a joint with Dr. Garduno and an KATRIN.    Orders:    MRI brain with and without contrast; Future    Tremor of both outstretched hands  Complains again of hand tremor, notices when he is driving the car and hands up on the steering well and at other times when he is eating.he reports the onset about 3 years ago .  He also reports a familial history of tremor in 2 uncles his father's  brothers.   Orders:    Ambulatory referral to Neurology; Future        History of Present Illness     Andriy Breen is a 75 y.o. male who presents  2year f/u visit with imagining surveillance  for Subpendymoms   required- number [id number]  HPI   He presents for 2-year follow-up follow-up imaging surveillance for known subependymoma.  This was discovered in 2019 as part of a workup for his left-sided hearing loss.  He states that overall he feels he is doing well.  He continues with multiple complaints none related to subependymoma/hydrocephalus.     MRI brain IAC w wo contrast 8/2019:  Lobulated mass descends between the dorsal mandibular and cerebellum from the caudal 4th ventricle.  This is 1.4 x 1.3 x 1.1 cm in CC, AP and transverse dimension respectively.  Very minimal enhancement of the inferior aspect of this mass, which is thought to represent subependymoma.     Social    and lives with wife.  Getting dependence -none  Alcohol use-none    Review of Systems   Constitutional: Negative.    HENT:  Positive for hearing loss (left hearing aid).    Eyes: Negative.    Respiratory: Negative.     Cardiovascular: Negative.    Gastrointestinal: Negative.    Endocrine: Negative.    Genitourinary: Negative.    Musculoskeletal:  Negative for gait problem.   Skin: Negative.    Allergic/Immunologic: Negative.    Neurological:  Positive for numbness (left leg sciatica  "numbness). Negative for dizziness, speech difficulty, light-headedness and headaches.   Hematological: Negative.    Psychiatric/Behavioral:  Negative for confusion.      I have personally reviewed the MA's review of systems and made changes as necessary.    Past Medical History   Past Medical History[1]  Past Surgical History[2]  Family History[3]  he reports that he has never smoked. He has never used smokeless tobacco. He reports current alcohol use. He reports that he does not use drugs.  Current Outpatient Medications   Medication Instructions    amLODIPine (NORVASC) 10 mg tablet TOME 1 TABLETA POR VIA ORAL TODOS LOS ESCOBAR    candesartan (ATACAND) 32 mg, Oral, Daily    docusate sodium (COLACE) 100 mg, Oral, 2 times daily    ibuprofen (MOTRIN) 600 mg, Oral, Every 8 hours PRN    linaGLIPtin 5 mg, Oral, Daily    metFORMIN (GLUCOPHAGE) 1000 MG tablet TOME 1 TABLETA POR VIA ORAL DOS VECES AL LUIS ALBERTO CON LAS COMIDAS    Multiple Vitamin (MULTIVITAMIN ADULT PO) Daily    omeprazole (PRILOSEC) 40 mg, Oral, Daily    oxybutynin (DITROPAN-XL) 5 mg, Oral, Daily    rosuvastatin (CRESTOR) 20 MG tablet TOME 1 TABLETA POR VIA ORAL TODOS LOS ESCOBAR    sildenafil (VIAGRA) 100 mg, Oral, Daily PRN   Allergies[4]   Objective   /82 (BP Location: Left arm, Patient Position: Sitting, Cuff Size: Standard)   Pulse 62   Temp 98.7 °F (37.1 °C) (Temporal)   Resp 17   Ht 5' 3\" (1.6 m)   Wt 78.9 kg (174 lb)   SpO2 98%   BMI 30.82 kg/m²     Physical Exam  Vitals and nursing note reviewed. Exam conducted with a chaperone present (wife).     Neurological:      Motor: Motor strength is normal.      Neurological Exam  Mental Status   Oriented to person, place, time and situation.    Motor  Normal muscle bulk throughout. Normal muscle tone. Strength is 5/5 throughout all four extremities.    Sensory  Light touch is normal in upper and lower extremities.     Coordination    Fine tremor with outstretched hands..    Gait  Casual gait is normal " including stance, stride, and arm swing.      Radiology Results Review: I have reviewed radiology reports from [facility] including: MRI brain.    Administrative Statements   I have spent a total time of 35 minutes in caring for this patient on the day of the visit/encounter including Diagnostic results, Risks and benefits of tx options, Instructions for management, Patient and family education, Importance of tx compliance, Risk factor reductions, Impressions, Counseling / Coordination of care, Documenting in the medical record, Reviewing/placing orders in the medical record (including tests, medications, and/or procedures), Obtaining or reviewing history  , and Communicating with other healthcare professionals .           [1]   Past Medical History:  Diagnosis Date    Abnormal echocardiogram 09/15/2017    Abnormal finding on MRI of brain 02/26/2020    Arthritis     OSTEOARTHRITIS OF LUMBAR SPINE    Back pain 2015    Brain tumor (HCC) 02/26/2020    Carpal tunnel syndrome     COVID     COVID-19 11/29/2022    Diabetes mellitus (HCC)     Elevated prostate specific antigen (PSA)     Erectile dysfunction     GERD (gastroesophageal reflux disease)     H. pylori infection 07/17/2018    History of BPH     Elim IRA (hard of hearing)     Wears bilateral hearing aides    Hx of adenomatous colonic polyps     Hyperlipidemia     Hypertension     Hypertension     Osteoarthritis     Wears dentures     full set of dentures   [2]   Past Surgical History:  Procedure Laterality Date    APPENDECTOMY      CARPAL TUNNEL RELEASE      COLONOSCOPY      CYSTOSCOPY W/ DILATION OF BLADDER  01/09/2017    DR. KILO LENZ, [facility]     EGD AND COLONOSCOPY N/A 2/8/2018    Procedure: EGD with biopsy AND COLONOSCOPY with polypectomy with hemo clip application;  Surgeon: Santana Paiz MD;  Location: [facility];  Service: Gastroenterology    NEUROPLASTY / TRANSPOSITION MEDIAN NERVE AT CARPAL TUNNEL      LEFT & RIGHT    ALIDA STARK  ELECTROSURG RESCJ PROSTATE BLEED COMPLETE N/A 2/25/2025    Procedure: (TURP);  Surgeon: Aj Li MD;  Location: [facility];  Service: Urology   [3]   Family History  Problem Relation Name Age of Onset    Heart disease Mother Katy     Hypertension Mother Katy     Ulcers Father      Stomach cancer Father      Diabetes Family      Hypertension Family      RAMY disease Family      Arthritis Family      Anesthesia problems Neg Hx     [4] No Known Allergies

## 2025-06-17 NOTE — TELEPHONE ENCOUNTER
6/17/25 Recall set in epic for a 2 year mri brain and follow up with dr. Best/yanet 6/2027. In basket message sent to Neurology for an appointment advised patient if he does not hear back from office for an appointment he should follow up.

## 2025-06-17 NOTE — PATIENT INSTRUCTIONS
Hydrocephalus   Headache    Nausea    Difficultly focusing eyes or gait   Unsteady walk or gait    Leg weakness    Sudden falls    Irritability    Drowsiness    Seizures    Irritability    Drowsiness    Personality changes    Bladder control problems

## 2025-07-18 ENCOUNTER — VBI (OUTPATIENT)
Dept: ADMINISTRATIVE | Facility: OTHER | Age: 75
End: 2025-07-18

## 2025-07-18 NOTE — TELEPHONE ENCOUNTER
07/18/25 12:37 PM     Chart reviewed for Hemoglobin A1c was/were submitted to the patient's insurance.     Norma Solomon MA   [facility]

## (undated) DEVICE — Device: Brand: OLYMPUS

## (undated) DEVICE — Device

## (undated) DEVICE — EXIDINE 4 PCT

## (undated) DEVICE — TUBING SUCTION 5MM X 12 FT

## (undated) DEVICE — THE LARIAT SNARE IS AN ELECTROSURGICAL DEVICE USED TO ENDOSCOPICALLY GRASP, DISSECT, AND TRANSECT TISSUE DURING GASTROINTESTINAL (GI) ENDOSCOPIC PROCEDURES.  THE SNARE CAN BE USED WITH OR WITHOUT THE USE OF MONOPOLAR DIATHERMIC ENERGY.: Brand: LARIAT

## (undated) DEVICE — 3M™ STERI-STRIP™ COMPOUND BENZOIN TINCTURE 40 BAGS/CARTON 4 CARTONS/CASE C1544: Brand: 3M™ STERI-STRIP™

## (undated) DEVICE — INVIEW CLEAR LEGGINGS: Brand: CONVERTORS

## (undated) DEVICE — BAG URINE DRAINAGE 4000ML CONTINUOUS IRR

## (undated) DEVICE — GLOVE SRG BIOGEL 7.5

## (undated) DEVICE — PREMIUM DRY TRAY LF: Brand: MEDLINE INDUSTRIES, INC.

## (undated) DEVICE — BASIC SINGLE BASIN-LF: Brand: MEDLINE INDUSTRIES, INC.

## (undated) DEVICE — SINGLE-USE BIOPSY FORCEPS: Brand: RADIAL JAW 4

## (undated) DEVICE — GAUZE SPONGES,16 PLY: Brand: CURITY

## (undated) DEVICE — EVACUATOR BLADDER ELLIK DISP STRL

## (undated) DEVICE — SCD SEQUENTIAL COMPRESSION COMFORT SLEEVE MEDIUM KNEE LENGTH: Brand: KENDALL SCD

## (undated) DEVICE — UROLOGIC DRAIN BAG: Brand: UNBRANDED

## (undated) DEVICE — PACK TUR

## (undated) DEVICE — WORKING LENGTH 235CM, WORKING CHANNEL 2.8MM: Brand: RESOLUTION 360 CLIP

## (undated) RX ORDER — ALCAFTADINE 2.5 MG/ML
1 SOLUTION/ DROPS OPHTHALMIC TWICE A DAY
Start: 2024-03-20